# Patient Record
Sex: FEMALE | Race: WHITE | NOT HISPANIC OR LATINO | Employment: OTHER | URBAN - METROPOLITAN AREA
[De-identification: names, ages, dates, MRNs, and addresses within clinical notes are randomized per-mention and may not be internally consistent; named-entity substitution may affect disease eponyms.]

---

## 2017-04-18 ENCOUNTER — HOSPITAL ENCOUNTER (OUTPATIENT)
Dept: RADIOLOGY | Facility: HOSPITAL | Age: 77
Discharge: HOME | End: 2017-04-18
Attending: FAMILY MEDICINE
Payer: MEDICARE

## 2017-04-18 ENCOUNTER — OFFICE VISIT (OUTPATIENT)
Dept: FAMILY MEDICINE | Facility: CLINIC | Age: 77
End: 2017-04-18
Payer: MEDICARE

## 2017-04-18 VITALS
HEART RATE: 68 BPM | SYSTOLIC BLOOD PRESSURE: 120 MMHG | HEIGHT: 65 IN | DIASTOLIC BLOOD PRESSURE: 82 MMHG | TEMPERATURE: 98 F | BODY MASS INDEX: 34.05 KG/M2 | OXYGEN SATURATION: 96 % | WEIGHT: 204.38 LBS

## 2017-04-18 DIAGNOSIS — I12.9 BENIGN HYPERTENSION WITH CHRONIC KIDNEY DISEASE, STAGE III: ICD-10-CM

## 2017-04-18 DIAGNOSIS — K21.9 GASTROESOPHAGEAL REFLUX DISEASE WITHOUT ESOPHAGITIS: ICD-10-CM

## 2017-04-18 DIAGNOSIS — E78.5 HYPERLIPIDEMIA, UNSPECIFIED HYPERLIPIDEMIA TYPE: ICD-10-CM

## 2017-04-18 DIAGNOSIS — M25.552 LEFT HIP PAIN: ICD-10-CM

## 2017-04-18 DIAGNOSIS — I10 HYPERTENSION, BENIGN: Primary | ICD-10-CM

## 2017-04-18 DIAGNOSIS — E11.9 CONTROLLED TYPE 2 DIABETES MELLITUS WITHOUT COMPLICATION, WITHOUT LONG-TERM CURRENT USE OF INSULIN: ICD-10-CM

## 2017-04-18 DIAGNOSIS — Z12.31 ENCOUNTER FOR SCREENING MAMMOGRAM FOR BREAST CANCER: ICD-10-CM

## 2017-04-18 DIAGNOSIS — N18.30 BENIGN HYPERTENSION WITH CHRONIC KIDNEY DISEASE, STAGE III: ICD-10-CM

## 2017-04-18 PROCEDURE — 73502 X-RAY EXAM HIP UNI 2-3 VIEWS: CPT | Mod: TC,PO,LT

## 2017-04-18 PROCEDURE — 99499 UNLISTED E&M SERVICE: CPT | Mod: S$PBB,,, | Performed by: FAMILY MEDICINE

## 2017-04-18 PROCEDURE — 1125F AMNT PAIN NOTED PAIN PRSNT: CPT | Mod: S$GLB,,, | Performed by: FAMILY MEDICINE

## 2017-04-18 PROCEDURE — 1160F RVW MEDS BY RX/DR IN RCRD: CPT | Mod: S$GLB,,, | Performed by: FAMILY MEDICINE

## 2017-04-18 PROCEDURE — 99214 OFFICE O/P EST MOD 30 MIN: CPT | Mod: S$GLB,,, | Performed by: FAMILY MEDICINE

## 2017-04-18 PROCEDURE — 3079F DIAST BP 80-89 MM HG: CPT | Mod: S$GLB,,, | Performed by: FAMILY MEDICINE

## 2017-04-18 PROCEDURE — 1159F MED LIST DOCD IN RCRD: CPT | Mod: S$GLB,,, | Performed by: FAMILY MEDICINE

## 2017-04-18 PROCEDURE — 99999 PR PBB SHADOW E&M-EST. PATIENT-LVL III: CPT | Mod: PBBFAC,,, | Performed by: FAMILY MEDICINE

## 2017-04-18 PROCEDURE — 3074F SYST BP LT 130 MM HG: CPT | Mod: S$GLB,,, | Performed by: FAMILY MEDICINE

## 2017-04-18 PROCEDURE — 73502 X-RAY EXAM HIP UNI 2-3 VIEWS: CPT | Mod: 26,LT,, | Performed by: RADIOLOGY

## 2017-04-18 RX ORDER — IRBESARTAN 150 MG/1
150 TABLET ORAL NIGHTLY
Qty: 30 TABLET | Refills: 12 | Status: SHIPPED | OUTPATIENT
Start: 2017-04-18 | End: 2018-05-09 | Stop reason: SDUPTHER

## 2017-04-18 RX ORDER — MULTIVITAMIN
1 TABLET ORAL DAILY
COMMUNITY

## 2017-04-18 RX ORDER — HYDROCHLOROTHIAZIDE 25 MG/1
25 TABLET ORAL DAILY
Qty: 30 TABLET | Refills: 12 | Status: SHIPPED | OUTPATIENT
Start: 2017-04-18 | End: 2018-05-09 | Stop reason: SDUPTHER

## 2017-04-18 RX ORDER — PREDNISONE 20 MG/1
60 TABLET ORAL DAILY
Qty: 15 TABLET | Refills: 0 | Status: SHIPPED | OUTPATIENT
Start: 2017-04-18 | End: 2017-04-25

## 2017-04-18 RX ORDER — MULTIVIT WITH MINERALS/HERBS
1 TABLET ORAL DAILY
COMMUNITY
End: 2019-08-13

## 2017-04-18 RX ORDER — METOPROLOL TARTRATE 100 MG/1
100 TABLET ORAL 2 TIMES DAILY
Qty: 60 TABLET | Refills: 12 | Status: SHIPPED | OUTPATIENT
Start: 2017-04-18 | End: 2018-05-09 | Stop reason: SDUPTHER

## 2017-04-18 NOTE — MR AVS SNAPSHOT
[facility]  [address]  [place] [address]  Phone: [phone]  Fax: [phone]                  Char Savage   2017 9:00 AM   Office Visit    Description:  Female : 1940   Provider:  Di Perez MD   Department:  [facility] [facility]           Reason for Visit     Establish Care     Diabetes     Medication Refill     Hip Pain           Diagnoses this Visit        Comments    Hypertension, benign    -  Primary     Gastroesophageal reflux disease without esophagitis         Hyperlipidemia, unspecified hyperlipidemia type         Benign hypertension with chronic kidney disease, stage III         Controlled type 2 diabetes mellitus without complication, without long-term current use of insulin         Encounter for screening mammogram for breast cancer         Left hip pain                To Do List           Future Appointments        Provider Department Dept Phone    2017 10:30 AM [facility] XR1 300 LB LIMIT [facility] [phone]      Goals (5 Years of Data)     None       These Medications        Disp Refills Start End    metoprolol tartrate (LOPRESSOR) 100 MG tablet 60 tablet 12 2017     Take 1 tablet (100 mg total) by mouth 2 (two) times daily. - Oral    Pharmacy: [address] Ph #: [phone]       hydrochlorothiazide (HYDRODIURIL) 25 MG tablet 30 tablet 2017     Take 1 tablet (25 mg total) by mouth once daily. - Oral    Pharmacy: [address] Ph #: [phone]       ranitidine (ZANTAC) 150 MG tablet 60 tablet 12 2017     Take 1 tablet (150 mg total) by mouth 2 (two) times daily. - Oral    Pharmacy: [address] Ph #: [phone]       irbesartan (AVAPRO) 150 MG tablet 30 tablet 12 2017     Take 1 tablet (150 mg total) by mouth every evening. -  Oral    [facility] [address] [facility] #: [phone]       predniSONE (DELTASONE) 20 MG tablet 15 tablet 0 4/18/2017 4/25/2017    Take 3 tablets (60 mg total) by mouth once daily. - Oral    Pharmacy: [address] Ph #: [phone]         [facility]     [facility] - 24/7 Assistance  Unless otherwise directed by your provider, please contact [facility], our nurse care line that is available for 24/7 assistance.     Registered nurses in the [facility] provide: appointment scheduling, clinical advisement, health education, and other advisory services.  Call: [phone] (toll free)               Medications           Message regarding Medications     Verify the changes and/or additions to your medication regime listed below are the same as discussed with your clinician today.  If any of these changes or additions are incorrect, please notify your healthcare provider.        START taking these NEW medications        Refills    predniSONE (DELTASONE) 20 MG tablet 0    Sig: Take 3 tablets (60 mg total) by mouth once daily.    Class: Normal    Route: Oral           Verify that the below list of medications is an accurate representation of the medications you are currently taking.  If none reported, the list may be blank. If incorrect, please contact your healthcare provider. Carry this list with you in case of emergency.           Current Medications     ascorbic acid (VITAMIN C) 100 MG tablet Take 100 mg by mouth once daily.      aspirin (ECOTRIN) 81 MG EC tablet Take 81 mg by mouth once daily.      b complex vitamins tablet Take 1 tablet by mouth once daily.    hydrochlorothiazide (HYDRODIURIL) 25 MG tablet Take 1 tablet (25 mg total) by mouth once daily.    irbesartan (AVAPRO) 150 MG tablet Take 1 tablet (150 mg total) by mouth every evening.    metformin (GLUCOPHAGE) 500 MG  "tablet Take 1 tablet (500 mg total) by mouth daily with breakfast.    metoprolol tartrate (LOPRESSOR) 100 MG tablet Take 1 tablet (100 mg total) by mouth 2 (two) times daily.    multivitamin (ONE DAILY MULTIVITAMIN) per tablet Take 1 tablet by mouth once daily.    pravastatin (PRAVACHOL) 20 MG tablet Take 1 tablet (20 mg total) by mouth once daily.    ranitidine (ZANTAC) 150 MG tablet Take 1 tablet (150 mg total) by mouth 2 (two) times daily.    predniSONE (DELTASONE) 20 MG tablet Take 3 tablets (60 mg total) by mouth once daily.           Clinical Reference Information           Your Vitals Were     BP Pulse Temp Height Weight SpO2    120/82 (BP Location: Left arm, Patient Position: Sitting, BP Method: Manual) 68 98.2 °F (36.8 °C) (Oral) 5' 5" (1.651 m) 92.7 kg (204 lb 5.9 oz) 96%    BMI                34.01 kg/m2          Blood Pressure          Most Recent Value    BP  120/82      Allergies as of 4/18/2017     Amoxicillin    Bactrim [Sulfamethoxazole-trimethoprim]    Darvon [Propoxyphene]    Toradol [Ketorolac]    Vibramycin [Doxycycline Calcium]      Immunizations Administered on Date of Encounter - 4/18/2017     None      Orders Placed During Today's Visit     Future Labs/Procedures Expected by Expires    Comprehensive metabolic panel  4/18/2017 4/18/2018    Hemoglobin A1c  4/18/2017 4/18/2018    Mammo Digital Screening Bilat with CAD  4/18/2017 6/18/2018    X-Ray Hip 2 View Left  4/18/2017 4/18/2018      [facility] Sign-Up     Activating your [facility] account is as easy as 1-2-3!     1) Visit [email or web address], select Sign Up Now, enter this activation code and your date of birth, then select Next.  [email or web address]  Expires: 6/2/2017 10:12 AM      2) Create a username and password to use when you visit [facility] in the future and select a security question in case you lose your password and select Next.    3) Enter your e-mail address and click Sign Up!    Additional Information  If you have questions, please " e-mail [email or web address] or call [phone] to talk to our [facility] staff. Remember, [facility] is NOT to be used for urgent needs. For medical emergencies, dial 911.         Language Assistance Services     ATTENTION: Language assistance services are available, free of charge. Please call [phone].      ATENCIÓN: Si habla español, tiene a awan disposición servicios gratuitos de asistencia lingüística. Llame al [phone].     CHÚ Ý: N?u b?n nói Ti?ng Vi?t, có các d?ch v? h? tr? ngôn ng? mi?n phí dành cho b?n. G?i s? [phone].         [facility] complies with applicable Federal civil rights laws and does not discriminate on the basis of race, color, national origin, age, disability, or sex.

## 2017-04-18 NOTE — PROGRESS NOTES
Office Visit    Patient Name: Char Savage    : 1940  MRN: [id number]    Subjective:  Char is a 76 y.o. female who presents today for:    Establish Care; Diabetes; Medication Refill; and Hip Pain      This patient has multiple medical diagnoses as noted below.  This patient is known to me and to this clinic.   She reports left hip pain that will go to her left knee and calf.  This began around .  She cannot tolerate Aleve or advil because of renal issues.  She has used tylenol.  She will have a sharp pain or can be a dull pain.  It is constant pain.  Most of the time it is sharp pain.  She can have muscle spasms in the left leg that will wake her up at night.  She has use aspercreme.  This can help the pain, but does not make the pain go away permanent.  No injury to the hip.  She does have a burning sensation in her leg at the anterior aspect of her left thigh.  It bothers her at night and first thing in the morning.      Active Problem List  Patient Active Problem List   Diagnosis    Hyperlipidemia    Obesity, Class II, BMI 35-39.9, with comorbidity    Benign hypertension with chronic kidney disease, stage III       Past Surgical History  Past Surgical History:   Procedure Laterality Date    APPENDECTOMY      BREAST BIOPSY  x3    CATARACT EXTRACTION      od/os    COLONOSCOPY      ORBITAL FRACTURE SURGERY      os dr coleman    PARTIAL HYSTERECTOMY      RIB FRACTURE SURGERY      TONSILLECTOMY, ADENOIDECTOMY         Family History  Family History   Problem Relation Age of Onset    Cancer Mother     Cataracts Father     Hypertension Father     No Known Problems Sister     No Known Problems Brother     No Known Problems Maternal Aunt     No Known Problems Maternal Uncle     No Known Problems Paternal Aunt     No Known Problems Paternal Uncle     No Known Problems Maternal Grandmother     No Known Problems Maternal Grandfather     No Known Problems Paternal  Grandmother     No Known Problems Paternal Grandfather     Amblyopia Neg Hx     Blindness Neg Hx     Diabetes Neg Hx     Glaucoma Neg Hx     Macular degeneration Neg Hx     Retinal detachment Neg Hx     Strabismus Neg Hx     Stroke Neg Hx     Thyroid disease Neg Hx        Social History  Social History     Social History    Marital status:      Spouse name: N/A    Number of children: N/A    Years of education: N/A     Occupational History    Not on file.     Social History Main Topics    Smoking status: Never Smoker    Smokeless tobacco: Never Used    Alcohol use No    Drug use: No    Sexual activity: Not on file     Other Topics Concern    Not on file     Social History Narrative       Current Medications  Medications reviewed and updated.     Allergies   Review of patient's allergies indicates:   Allergen Reactions    Amoxicillin Other (See Comments)    Bactrim [sulfamethoxazole-trimethoprim] Other (See Comments)    Darvon [propoxyphene] Other (See Comments)    Toradol [ketorolac] Other (See Comments)    Vibramycin [doxycycline calcium] Rash       Review of Systems (Pertinent positives)  Review of Systems   Constitutional: Negative for activity change, appetite change, fatigue, fever and unexpected weight change.   HENT: Negative for ear discharge, ear pain, nosebleeds, rhinorrhea and sore throat.    Eyes: Negative.    Respiratory: Negative for apnea, cough, chest tightness, shortness of breath and wheezing.    Cardiovascular: Negative for chest pain, palpitations and leg swelling.   Gastrointestinal: Negative for abdominal distention, abdominal pain, constipation, diarrhea and vomiting.   Endocrine: Negative for cold intolerance, heat intolerance, polydipsia and polyuria.   Genitourinary: Negative for decreased urine volume, menstrual problem, urgency, vaginal bleeding, vaginal discharge and vaginal pain.   Musculoskeletal: Positive for myalgias.   Skin: Negative for rash.  "  Neurological: Negative for dizziness and headaches.   Hematological: Does not bruise/bleed easily.   Psychiatric/Behavioral: Negative for agitation, sleep disturbance and suicidal ideas.       /82 (BP Location: Left arm, Patient Position: Sitting, BP Method: Manual)  Pulse 68  Temp 98.2 °F (36.8 °C) (Oral)   Ht 5' 5" (1.651 m)  Wt 92.7 kg (204 lb 5.9 oz)  SpO2 96%  BMI 34.01 kg/m2    Physical Exam   Constitutional: She is oriented to person, place, and time. She appears well-developed and well-nourished.   HENT:   Head: Normocephalic and atraumatic.   Right Ear: External ear normal.   Left Ear: External ear normal.   Nose: Nose normal.   Mouth/Throat: Oropharynx is clear and moist.   Eyes: Conjunctivae and EOM are normal. Pupils are equal, round, and reactive to light.   Neck: Normal range of motion. No JVD present. No thyromegaly present.   Cardiovascular: Normal rate, regular rhythm and normal heart sounds.    Pulmonary/Chest: Effort normal and breath sounds normal. She has no wheezes.   Abdominal: Soft. Bowel sounds are normal. She exhibits no distension. There is no tenderness.   Musculoskeletal: Normal range of motion.        Legs:  Lymphadenopathy:     She has no cervical adenopathy.   Neurological: She is alert and oriented to person, place, and time.   Skin: Skin is warm and dry.   Psychiatric: She has a normal mood and affect. Her behavior is normal.   Vitals reviewed.      Health Maintenance  Health Maintenance Topics with due status: Not Due       Topic Last Completion Date    Eye Exam 09/20/2016    Lipid Panel 11/10/2016    Hemoglobin A1c 04/18/2017       Assessment/Plan:  Char Savage is a 76 y.o. female who presents today for :    Char was seen today for establish care, diabetes, medication refill and hip pain.    Diagnoses and all orders for this visit:    Hypertension, benign  -     metoprolol tartrate (LOPRESSOR) 100 MG tablet; Take 1 tablet (100 mg total) by mouth 2 (two) times " daily.  -     hydrochlorothiazide (HYDRODIURIL) 25 MG tablet; Take 1 tablet (25 mg total) by mouth once daily.  -     irbesartan (AVAPRO) 150 MG tablet; Take 1 tablet (150 mg total) by mouth every evening.  -     Hemoglobin A1c; Future  -     Comprehensive metabolic panel; Future    Gastroesophageal reflux disease without esophagitis  -     ranitidine (ZANTAC) 150 MG tablet; Take 1 tablet (150 mg total) by mouth 2 (two) times daily.    Hyperlipidemia, unspecified hyperlipidemia type  -   The patient is asked to make an attempt to improve diet and exercise patterns to aid in medical management of this problem.    Benign hypertension with chronic kidney disease, stage III  -  Patient is stable.  Assess and addressed all modifiable risk factors.  Continue with appropriate management to prevent complications.    Controlled type 2 diabetes mellitus without complication, without long-term current use of insulin  -     Hemoglobin A1c; Future    Encounter for screening mammogram for breast cancer  -     Mammo Digital Screening Bilat with CAD; Future    Left hip pain  -     X-Ray Hip 2 View Left; Future  -     predniSONE (DELTASONE) 20 MG tablet; Take 3 tablets (60 mg total) by mouth once daily.  -  Conservative management        No Follow-up on file.

## 2017-04-20 ENCOUNTER — TELEPHONE (OUTPATIENT)
Dept: FAMILY MEDICINE | Facility: CLINIC | Age: 77
End: 2017-04-20

## 2017-04-20 NOTE — TELEPHONE ENCOUNTER
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

## 2017-04-20 NOTE — TELEPHONE ENCOUNTER
----- Message from Di Perez MD sent at 4/19/2017  3:28 PM CDT -----  Your blood sugar levels are doing well.  Keep up the great work.  No changes at this time.

## 2017-05-02 ENCOUNTER — HOSPITAL ENCOUNTER (OUTPATIENT)
Dept: RADIOLOGY | Facility: HOSPITAL | Age: 77
Discharge: HOME | End: 2017-05-02
Attending: FAMILY MEDICINE
Payer: MEDICARE

## 2017-05-02 DIAGNOSIS — Z12.31 ENCOUNTER FOR SCREENING MAMMOGRAM FOR BREAST CANCER: ICD-10-CM

## 2017-05-02 PROCEDURE — 77067 SCR MAMMO BI INCL CAD: CPT | Mod: 26,,, | Performed by: RADIOLOGY

## 2017-05-02 PROCEDURE — 77063 BREAST TOMOSYNTHESIS BI: CPT | Mod: 26,,, | Performed by: RADIOLOGY

## 2017-05-02 PROCEDURE — 77067 SCR MAMMO BI INCL CAD: CPT | Mod: TC

## 2017-05-22 DIAGNOSIS — E78.00 PURE HYPERCHOLESTEROLEMIA: ICD-10-CM

## 2017-05-22 RX ORDER — PRAVASTATIN SODIUM 20 MG/1
20 TABLET ORAL DAILY
Qty: 30 TABLET | Refills: 6 | Status: SHIPPED | OUTPATIENT
Start: 2017-05-22 | End: 2017-10-10

## 2017-05-23 DIAGNOSIS — E11.9 DIABETES MELLITUS TYPE II, NON INSULIN DEPENDENT: ICD-10-CM

## 2017-05-23 RX ORDER — METFORMIN HYDROCHLORIDE 500 MG/1
500 TABLET ORAL
Qty: 30 TABLET | Refills: 6 | Status: SHIPPED | OUTPATIENT
Start: 2017-05-23 | End: 2017-10-17

## 2017-10-10 ENCOUNTER — OFFICE VISIT (OUTPATIENT)
Dept: OPTOMETRY | Facility: CLINIC | Age: 77
End: 2017-10-10
Payer: MEDICARE

## 2017-10-10 DIAGNOSIS — H52.4 MYOPIA WITH ASTIGMATISM AND PRESBYOPIA, BILATERAL: ICD-10-CM

## 2017-10-10 DIAGNOSIS — D31.31 CHOROIDAL NEVUS, RIGHT: ICD-10-CM

## 2017-10-10 DIAGNOSIS — Z96.1 PSEUDOPHAKIA OF BOTH EYES: ICD-10-CM

## 2017-10-10 DIAGNOSIS — Z13.5 GLAUCOMA SCREENING: ICD-10-CM

## 2017-10-10 DIAGNOSIS — E11.9 DIABETES MELLITUS TYPE 2 WITHOUT RETINOPATHY: Primary | ICD-10-CM

## 2017-10-10 DIAGNOSIS — H52.203 MYOPIA WITH ASTIGMATISM AND PRESBYOPIA, BILATERAL: ICD-10-CM

## 2017-10-10 DIAGNOSIS — H52.13 MYOPIA WITH ASTIGMATISM AND PRESBYOPIA, BILATERAL: ICD-10-CM

## 2017-10-10 PROCEDURE — 92014 COMPRE OPH EXAM EST PT 1/>: CPT | Mod: S$GLB,,, | Performed by: OPTOMETRIST

## 2017-10-10 PROCEDURE — 99499 UNLISTED E&M SERVICE: CPT | Mod: S$PBB,,, | Performed by: OPTOMETRIST

## 2017-10-10 PROCEDURE — 99999 PR PBB SHADOW E&M-EST. PATIENT-LVL II: CPT | Mod: PBBFAC,,, | Performed by: OPTOMETRIST

## 2017-10-10 PROCEDURE — 92015 DETERMINE REFRACTIVE STATE: CPT | Mod: S$GLB,,, | Performed by: OPTOMETRIST

## 2017-10-10 NOTE — PROGRESS NOTES
HPI     DLS:  9/20/16    Pt here for yearly check.  Pt states VA OU fluctuates in the morning.  Pt   denies flashes, floaters, eye pain or headaches.  Pt states some itching   OU.  Pt denies tearing or burning OU.      No eyedrops  S/p phaco w/IOL OU     Hemoglobin A1C       Date                     Value               Ref Range             Status                04/18/2017               6.4 (H)             4.5 - 6.2 %           Final                 11/10/2016               6.5 (H)             4.5 - 6.2 %           Final                 04/18/2016               6.8 (H)             4.5 - 6.2 %           Final            ----------      Last edited by Julien Ferrari, OD on 10/10/2017 10:35 AM. (History)            Assessment /Plan     For exam results, see Encounter Report.    Diabetes mellitus type 2 without retinopathy  -No retinopathy noted today.  Continued control with primary care physician and annual comprehensive eye exam.    Choroidal nevus, right  -monitor annual DFE    Glaucoma screening  -Monitor with annual eye exam and IOP check  -OCT at future.  Stable CD's today    Pseudophakia of both eyes  -clear, centered    Myopia with astigmatism and presbyopia, bilateral  Eyeglass Final Rx     Eyeglass Final Rx       Sphere Cylinder Axis Dist VA Add    Right -2.50 +3.00 010 20/25 +2.50    Left -1.25 +2.75 165 20/25-- +2.50    Type:  PAL    Expiration Date:  10/11/2018                  RTC 1 yr

## 2017-10-17 ENCOUNTER — OFFICE VISIT (OUTPATIENT)
Dept: FAMILY MEDICINE | Facility: CLINIC | Age: 77
End: 2017-10-17
Payer: MEDICARE

## 2017-10-17 ENCOUNTER — LAB VISIT (OUTPATIENT)
Dept: LAB | Facility: HOSPITAL | Age: 77
End: 2017-10-17
Attending: FAMILY MEDICINE
Payer: MEDICARE

## 2017-10-17 VITALS
SYSTOLIC BLOOD PRESSURE: 130 MMHG | DIASTOLIC BLOOD PRESSURE: 60 MMHG | HEIGHT: 65 IN | OXYGEN SATURATION: 97 % | HEART RATE: 67 BPM | TEMPERATURE: 98 F | BODY MASS INDEX: 33.72 KG/M2 | WEIGHT: 202.38 LBS

## 2017-10-17 DIAGNOSIS — Z23 NEED FOR 23-POLYVALENT PNEUMOCOCCAL POLYSACCHARIDE VACCINE: ICD-10-CM

## 2017-10-17 DIAGNOSIS — E11.9 TYPE 2 DIABETES MELLITUS WITHOUT COMPLICATION, WITHOUT LONG-TERM CURRENT USE OF INSULIN: ICD-10-CM

## 2017-10-17 DIAGNOSIS — M94.9 DISORDER OF BONE AND CARTILAGE: ICD-10-CM

## 2017-10-17 DIAGNOSIS — N18.30 BENIGN HYPERTENSION WITH CHRONIC KIDNEY DISEASE, STAGE III: ICD-10-CM

## 2017-10-17 DIAGNOSIS — E11.9 TYPE 2 DIABETES MELLITUS WITHOUT COMPLICATION, WITHOUT LONG-TERM CURRENT USE OF INSULIN: Primary | ICD-10-CM

## 2017-10-17 DIAGNOSIS — M89.9 DISORDER OF BONE AND CARTILAGE: ICD-10-CM

## 2017-10-17 DIAGNOSIS — E78.5 HYPERLIPIDEMIA, UNSPECIFIED HYPERLIPIDEMIA TYPE: ICD-10-CM

## 2017-10-17 DIAGNOSIS — I12.9 BENIGN HYPERTENSION WITH CHRONIC KIDNEY DISEASE, STAGE III: ICD-10-CM

## 2017-10-17 LAB
ALBUMIN SERPL BCP-MCNC: 3.7 G/DL
ALP SERPL-CCNC: 101 U/L
ALT SERPL W/O P-5'-P-CCNC: 14 U/L
ANION GAP SERPL CALC-SCNC: 13 MMOL/L
AST SERPL-CCNC: 20 U/L
BASOPHILS # BLD AUTO: 0.09 K/UL
BASOPHILS NFR BLD: 1.2 %
BILIRUB SERPL-MCNC: 0.8 MG/DL
BUN SERPL-MCNC: 27 MG/DL
CALCIUM SERPL-MCNC: 10.3 MG/DL
CHLORIDE SERPL-SCNC: 104 MMOL/L
CHOLEST SERPL-MCNC: 248 MG/DL
CHOLEST/HDLC SERPL: 4 {RATIO}
CO2 SERPL-SCNC: 24 MMOL/L
CREAT SERPL-MCNC: 1.4 MG/DL
DIFFERENTIAL METHOD: ABNORMAL
EOSINOPHIL # BLD AUTO: 0.2 K/UL
EOSINOPHIL NFR BLD: 1.9 %
ERYTHROCYTE [DISTWIDTH] IN BLOOD BY AUTOMATED COUNT: 13.8 %
EST. GFR  (AFRICAN AMERICAN): 41.2 ML/MIN/1.73 M^2
EST. GFR  (NON AFRICAN AMERICAN): 35.8 ML/MIN/1.73 M^2
GLUCOSE SERPL-MCNC: 127 MG/DL
HCT VFR BLD AUTO: 42.4 %
HDLC SERPL-MCNC: 62 MG/DL
HDLC SERPL: 25 %
HGB BLD-MCNC: 13.5 G/DL
IMM GRANULOCYTES # BLD AUTO: 0.02 K/UL
IMM GRANULOCYTES NFR BLD AUTO: 0.3 %
LDLC SERPL CALC-MCNC: 151.6 MG/DL
LYMPHOCYTES # BLD AUTO: 3.1 K/UL
LYMPHOCYTES NFR BLD: 39.5 %
MCH RBC QN AUTO: 28.3 PG
MCHC RBC AUTO-ENTMCNC: 31.8 G/DL
MCV RBC AUTO: 89 FL
MONOCYTES # BLD AUTO: 0.9 K/UL
MONOCYTES NFR BLD: 11.5 %
NEUTROPHILS # BLD AUTO: 3.5 K/UL
NEUTROPHILS NFR BLD: 45.6 %
NONHDLC SERPL-MCNC: 186 MG/DL
NRBC BLD-RTO: 0 /100 WBC
PLATELET # BLD AUTO: 317 K/UL
PMV BLD AUTO: 10.5 FL
POTASSIUM SERPL-SCNC: 5.1 MMOL/L
PROT SERPL-MCNC: 8 G/DL
RBC # BLD AUTO: 4.77 M/UL
SODIUM SERPL-SCNC: 141 MMOL/L
TRIGL SERPL-MCNC: 172 MG/DL
TSH SERPL DL<=0.005 MIU/L-ACNC: 3.13 UIU/ML
WBC # BLD AUTO: 7.75 K/UL

## 2017-10-17 PROCEDURE — G0009 ADMIN PNEUMOCOCCAL VACCINE: HCPCS | Mod: S$GLB,,, | Performed by: FAMILY MEDICINE

## 2017-10-17 PROCEDURE — 99499 UNLISTED E&M SERVICE: CPT | Mod: S$PBB,,, | Performed by: FAMILY MEDICINE

## 2017-10-17 PROCEDURE — 80061 LIPID PANEL: CPT

## 2017-10-17 PROCEDURE — 85025 COMPLETE CBC W/AUTO DIFF WBC: CPT

## 2017-10-17 PROCEDURE — 99999 PR PBB SHADOW E&M-EST. PATIENT-LVL III: CPT | Mod: PBBFAC,,, | Performed by: FAMILY MEDICINE

## 2017-10-17 PROCEDURE — 36415 COLL VENOUS BLD VENIPUNCTURE: CPT | Mod: PO

## 2017-10-17 PROCEDURE — 80053 COMPREHEN METABOLIC PANEL: CPT

## 2017-10-17 PROCEDURE — 99214 OFFICE O/P EST MOD 30 MIN: CPT | Mod: S$GLB,,, | Performed by: FAMILY MEDICINE

## 2017-10-17 PROCEDURE — 90732 PPSV23 VACC 2 YRS+ SUBQ/IM: CPT | Mod: S$GLB,,, | Performed by: FAMILY MEDICINE

## 2017-10-17 PROCEDURE — 84443 ASSAY THYROID STIM HORMONE: CPT

## 2017-10-17 PROCEDURE — 83036 HEMOGLOBIN GLYCOSYLATED A1C: CPT

## 2017-10-17 RX ORDER — ROSUVASTATIN CALCIUM 10 MG/1
10 TABLET, COATED ORAL DAILY
Qty: 90 TABLET | Refills: 3 | Status: SHIPPED | OUTPATIENT
Start: 2017-10-17 | End: 2018-06-06 | Stop reason: SINTOL

## 2017-10-17 RX ORDER — METFORMIN HYDROCHLORIDE 500 MG/1
500 TABLET, EXTENDED RELEASE ORAL
Qty: 30 TABLET | Refills: 6 | Status: SHIPPED | OUTPATIENT
Start: 2017-10-17 | End: 2018-05-15 | Stop reason: SDUPTHER

## 2017-10-17 NOTE — PROGRESS NOTES
Office Visit    Patient Name: Char Savage    : 1940  MRN: [id number]    Subjective:  Char is a 77 y.o. female who presents today for:    Diabetes (six month health check)      This patient has multiple medical diagnoses as noted below.  This patient is known to me and to this clinic. She recently reports that she stopped taking the pravastatin secondary to muscle pain. She had concerns about checking her blood glucose levels at home.  She has been consistent under 7 for hgA1C.      She had increasing pain in her hip pain.  It has impacted her quality of life.  She stopped the pravastatin.  This did improve her pain.  She also reports increasing leg cramps at night.    Patient Active Problem List   Diagnosis    Hyperlipidemia    Obesity, Class II, BMI 35-39.9, with comorbidity    Benign hypertension with chronic kidney disease, stage III    Type 2 diabetes mellitus without complication, without long-term current use of insulin       Past Surgical History:   Procedure Laterality Date    APPENDECTOMY      BREAST BIOPSY  x3    CATARACT EXTRACTION      od/os    COLONOSCOPY      ORBITAL FRACTURE SURGERY      os dr coleman    PARTIAL HYSTERECTOMY      RIB FRACTURE SURGERY      TONSILLECTOMY, ADENOIDECTOMY         Family History   Problem Relation Age of Onset    Cancer Mother     Cataracts Father     Hypertension Father     No Known Problems Sister     No Known Problems Brother     No Known Problems Maternal Aunt     No Known Problems Maternal Uncle     No Known Problems Paternal Aunt     No Known Problems Paternal Uncle     No Known Problems Maternal Grandmother     No Known Problems Maternal Grandfather     No Known Problems Paternal Grandmother     No Known Problems Paternal Grandfather     Amblyopia Neg Hx     Blindness Neg Hx     Diabetes Neg Hx     Glaucoma Neg Hx     Macular degeneration Neg Hx     Retinal detachment Neg Hx     Strabismus Neg Hx     Stroke Neg  Hx     Thyroid disease Neg Hx        Social History     Social History    Marital status:      Spouse name: N/A    Number of children: N/A    Years of education: N/A     Occupational History    Not on file.     Social History Main Topics    Smoking status: Never Smoker    Smokeless tobacco: Never Used    Alcohol use No    Drug use: No    Sexual activity: Not on file     Other Topics Concern    Not on file     Social History Narrative    No narrative on file       Current Medications  Medications reviewed and updated.     Allergies   Review of patient's allergies indicates:   Allergen Reactions    Amoxicillin Other (See Comments)    Bactrim [sulfamethoxazole-trimethoprim] Other (See Comments)    Darvon [propoxyphene] Other (See Comments)    Toradol [ketorolac] Other (See Comments)    Vibramycin [doxycycline calcium] Rash         Labs  Lab Results   Component Value Date    HGBA1C 6.4 (H) 04/18/2017     Lab Results   Component Value Date     04/18/2017    K 5.1 04/18/2017     04/18/2017    CO2 28 04/18/2017    BUN 29 (H) 04/18/2017    CREATININE 1.5 (H) 04/18/2017    CALCIUM 10.4 04/18/2017    ANIONGAP 8 04/18/2017    ESTGFRAFRICA 38.2 (A) 04/18/2017    EGFRNONAA 33.1 (A) 04/18/2017     Lab Results   Component Value Date    CHOL 188 11/10/2016    CHOL 193 04/18/2016    CHOL 193 04/14/2015     Lab Results   Component Value Date    HDL 60 11/10/2016    HDL 64 04/18/2016    HDL 64 04/14/2015     Lab Results   Component Value Date    LDLCALC 101.0 11/10/2016    LDLCALC 96.0 04/18/2016    LDLCALC 95.6 04/14/2015     Lab Results   Component Value Date    TRIG 135 11/10/2016    TRIG 165 (H) 04/18/2016    TRIG 167 (H) 04/14/2015     Lab Results   Component Value Date    CHOLHDL 31.9 11/10/2016    CHOLHDL 33.2 04/18/2016    CHOLHDL 33.2 04/14/2015     Last set of blood work has been reviewed as noted above.    Review of Systems   Constitutional: Negative for activity change, appetite change,  "fatigue, fever and unexpected weight change.   HENT: Negative.  Negative for ear discharge, ear pain, rhinorrhea and sore throat.    Eyes: Negative.    Respiratory: Negative for apnea, cough, chest tightness, shortness of breath and wheezing.    Cardiovascular: Negative for chest pain, palpitations and leg swelling.   Gastrointestinal: Positive for diarrhea. Negative for abdominal distention, abdominal pain, constipation and vomiting.   Endocrine: Negative for cold intolerance, heat intolerance, polydipsia and polyuria.   Genitourinary: Negative for decreased urine volume, menstrual problem, urgency, vaginal bleeding, vaginal discharge and vaginal pain.   Musculoskeletal: Positive for arthralgias and myalgias.   Skin: Negative for rash.   Neurological: Negative for dizziness and headaches.   Hematological: Does not bruise/bleed easily.   Psychiatric/Behavioral: Negative for agitation, sleep disturbance and suicidal ideas.       /60 (BP Location: Left arm, Patient Position: Sitting, BP Method: Large (Manual))   Pulse 67   Temp 98.1 °F (36.7 °C) (Oral)   Ht 5' 5" (1.651 m)   Wt 91.8 kg (202 lb 6.1 oz)   SpO2 97%   BMI 33.68 kg/m²      Physical Exam   Constitutional: She is oriented to person, place, and time. She appears well-developed and well-nourished.   HENT:   Head: Normocephalic.   Right Ear: External ear normal.   Left Ear: External ear normal.   Nose: Nose normal.   Mouth/Throat: Oropharynx is clear and moist.   Eyes: Conjunctivae and EOM are normal. Pupils are equal, round, and reactive to light.   Cardiovascular: Normal rate, regular rhythm and normal heart sounds.    Pulmonary/Chest: Effort normal and breath sounds normal.   Neurological: She is alert and oriented to person, place, and time.   Skin: Skin is warm and dry.   Vitals reviewed.      Health Maintenance  Health Maintenance       Date Due Completion Date    Foot Exam 08/03/1948 ---    TETANUS VACCINE 08/03/1956 ---    Zoster Vaccine " 08/03/1998 ---    DEXA SCAN 03/05/2016 3/5/2013    Pneumococcal (65+) (2 of 2 - PPSV23) 09/16/2016 9/16/2015    Hemoglobin A1c 10/18/2017 4/18/2017    Lipid Panel 11/10/2017 11/10/2016    Eye Exam 10/10/2018 10/10/2017    Override on 10/10/2017: Done          Assessment/Plan:  Char Savage is a 77 y.o. female who presents today for :    1. Type 2 diabetes mellitus without complication, without long-term current use of insulin    2. Benign hypertension with chronic kidney disease, stage III    3. Need for 23-polyvalent pneumococcal polysaccharide vaccine    4. Disorder of bone and cartilage    5. Hyperlipidemia, unspecified hyperlipidemia type        Problem List Items Addressed This Visit        Unprioritized    Benign hypertension with chronic kidney disease, stage III    Relevant Orders    CBC auto differential    Comprehensive metabolic panel    Lipid panel    Hemoglobin A1c    TSH    Hyperlipidemia    Overview     Cannot tolerate pravastatin         Current Assessment & Plan     Try rosuvastatin. If symptoms return will use diet modification to improve control          Relevant Medications    rosuvastatin (CRESTOR) 10 MG tablet    Type 2 diabetes mellitus without complication, without long-term current use of insulin - Primary    Overview     Cannot tolerate metformin          Current Assessment & Plan     Will use XR metformin          Relevant Medications    metformin (GLUCOPHAGE-XR) 500 MG 24 hr tablet    Other Relevant Orders    CBC auto differential    Comprehensive metabolic panel    Lipid panel    Hemoglobin A1c    TSH      Other Visit Diagnoses     Need for 23-polyvalent pneumococcal polysaccharide vaccine        Relevant Orders    Pneumococcal Polysaccharide Vaccine (23 Valent) (SQ/IM) (Completed)    Disorder of bone and cartilage        Relevant Orders    DXA Bone Density Spine And Hip          Return in about 6 months (around 4/17/2018) for Diabetes Mellitus II.     This note was created by  combination of typed  and Dragon dictation.  Transcription errors may be present.  If there are any questions, please contact me.

## 2017-10-17 NOTE — PATIENT INSTRUCTIONS
1.  Start the new metformin medication first.  Take for two week.  If no side effects continue the medication     2.  After two weeks of metformin begin the cholesterol medication rosuvastatin.  Take for two weeks.  If no side effects continue the medication.     3.  For cramps at night use a half a glass of tonic water or take one table to over the counter potassium.

## 2017-10-18 ENCOUNTER — TELEPHONE (OUTPATIENT)
Dept: FAMILY MEDICINE | Facility: CLINIC | Age: 77
End: 2017-10-18

## 2017-10-18 DIAGNOSIS — N18.9 CHRONIC KIDNEY DISEASE, UNSPECIFIED CKD STAGE: Primary | ICD-10-CM

## 2017-10-18 LAB
ESTIMATED AVG GLUCOSE: 131 MG/DL
HBA1C MFR BLD HPLC: 6.2 %

## 2017-10-18 NOTE — TELEPHONE ENCOUNTER
----- Message from ALEKS Wolfe sent at 10/18/2017  8:44 AM CDT -----  Diabetes is doing well. Continue current medication dosage. Your blood count and thyroid labs are normal. Your kidney labs are still decreased, a referral to nephrology (kidney specialist) has been placed. Someone from our referral department will contact you. Your cholesterol is elevated. Start the new cholesterol medication . Repeat lab in about 3 months.

## 2017-10-30 ENCOUNTER — HOSPITAL ENCOUNTER (OUTPATIENT)
Dept: RADIOLOGY | Facility: CLINIC | Age: 77
Discharge: HOME | End: 2017-10-30
Attending: FAMILY MEDICINE
Payer: MEDICARE

## 2017-10-30 DIAGNOSIS — M94.9 DISORDER OF BONE AND CARTILAGE: ICD-10-CM

## 2017-10-30 DIAGNOSIS — M89.9 DISORDER OF BONE AND CARTILAGE: ICD-10-CM

## 2017-10-30 PROCEDURE — 77080 DXA BONE DENSITY AXIAL: CPT | Mod: 26,,, | Performed by: INTERNAL MEDICINE

## 2017-10-30 PROCEDURE — 77080 DXA BONE DENSITY AXIAL: CPT | Mod: TC,PO

## 2017-11-28 ENCOUNTER — OFFICE VISIT (OUTPATIENT)
Dept: NEPHROLOGY | Facility: CLINIC | Age: 77
End: 2017-11-28
Payer: MEDICARE

## 2017-11-28 VITALS
SYSTOLIC BLOOD PRESSURE: 162 MMHG | HEIGHT: 65 IN | BODY MASS INDEX: 34.08 KG/M2 | WEIGHT: 204.56 LBS | HEART RATE: 74 BPM | DIASTOLIC BLOOD PRESSURE: 80 MMHG | OXYGEN SATURATION: 96 %

## 2017-11-28 DIAGNOSIS — N18.30 CHRONIC KIDNEY DISEASE, STAGE III (MODERATE): Primary | ICD-10-CM

## 2017-11-28 PROCEDURE — 99999 PR PBB SHADOW E&M-EST. PATIENT-LVL III: CPT | Mod: PBBFAC,,, | Performed by: INTERNAL MEDICINE

## 2017-11-28 PROCEDURE — 99499 UNLISTED E&M SERVICE: CPT | Mod: S$PBB,,, | Performed by: INTERNAL MEDICINE

## 2017-11-28 PROCEDURE — 99203 OFFICE O/P NEW LOW 30 MIN: CPT | Mod: S$GLB,,, | Performed by: INTERNAL MEDICINE

## 2017-11-28 NOTE — LETTER
December 5, 2017      Grace Delgado, FNP-C  [address] [facility]  [place] [address]           [facility]  [address] [facility]  [place] [address]  Phone: [phone]          Patient: Char Savage   MR Number: [id number]   YOB: 1940   Date of Visit: 11/28/2017       Dear Grace Delgado:    Thank you for referring Char Savage to me for evaluation. Attached you will find relevant portions of my assessment and plan of care.    If you have questions, please do not hesitate to call me. I look forward to following Char Savage along with you.    Sincerely,    Dunia Foster  CC:  No Recipients    If you would like to receive this communication electronically, please contact [email or web address] or [phone] to request more information on [facility] [employer] access.    For providers and/or their staff who would like to refer a patient to [facility], please contact us through our one-stop-shop provider referral line, [facility], at [phone].    If you feel you have received this communication in error or would no longer like to receive these types of communications, please e-mail [email or web address]

## 2017-12-06 NOTE — PROGRESS NOTES
Subjective:       Patient ID: Char Savage is a 77 y.o. White female who presents for new evaluation of Chronic Kidney Disease    HPI  Ms. Savage is a 77 year old woman with medical history of diabetes, hypertension presenting for evaluation of chronic kidney disease.  Patient creatinine 1.4-1.5mg/dL for the past few years.  She reports blood sugars well-controlled, blood pressure usually well-controlled.  She otherwise denies any fever, chest pain, shortness of breath, abdominal pain, diarrhea, dysuria/hematuria.  She reports adequate fluid intake, denies any NSAID use.      Review of Systems   Constitutional: Negative for appetite change, fatigue and fever.   Respiratory: Negative for chest tightness and shortness of breath.    Cardiovascular: Negative for chest pain and leg swelling.   Gastrointestinal: Negative for abdominal pain, constipation, diarrhea, nausea and vomiting.   Genitourinary: Negative for difficulty urinating, dysuria, flank pain, frequency, hematuria and urgency.   Musculoskeletal: Negative for arthralgias, joint swelling and myalgias.   Skin: Negative for rash and wound.   Neurological: Negative for dizziness, weakness and light-headedness.   All other systems reviewed and are negative.      Objective:      Physical Exam   Constitutional: She appears well-developed and well-nourished.   Cardiovascular: Normal rate, regular rhythm and normal heart sounds.  Exam reveals no gallop and no friction rub.    No murmur heard.  Pulmonary/Chest: Effort normal and breath sounds normal. No respiratory distress. She has no wheezes. She has no rales.   Abdominal: Soft. Bowel sounds are normal. There is no tenderness.   Musculoskeletal: She exhibits no edema.   Neurological: She is alert.   Skin: Skin is warm and dry. No rash noted. No erythema.   Psychiatric: She has a normal mood and affect.       Assessment:       1. Chronic kidney disease, stage III (moderate)        Plan:     Ms. Savage is a 77 year  old woman with medical history of diabetes, hypertension presenting for evaluation of chronic kidney disease.  Patient creatinine 1.4-1.5mg/dL, consistent with chronic kidney disease.  Creatinine stable, patient on ARB, will obtain urine studies to rule out infectious/glomerular etiology, will obtain renal US with doppler to rule out obstructive/vascular etiology.  Stressed importance of blood pressure/glycemic control, along with weight loss, to prevent any further progression of kidney disease, patient voiced understanding.     Return to clinic in 6 months with renal/heme panel, iron/TIBC/ferritin, urinalysis/culture, urine protein/creatinine ratio, PTH/VitD prior to next visit

## 2018-01-22 ENCOUNTER — TELEPHONE (OUTPATIENT)
Dept: FAMILY MEDICINE | Facility: CLINIC | Age: 78
End: 2018-01-22

## 2018-01-22 NOTE — TELEPHONE ENCOUNTER
----- Message from Tessa Heard sent at 1/22/2018  8:31 AM CST -----  Contact: self  [phone]  PINK DOT ..........PINK DOT     Pt has cough, fever , sore throat and chills. Pls call pt [phone]. Thanks......Cornelia DE LOS SANTOS

## 2018-01-22 NOTE — TELEPHONE ENCOUNTER
Spoke with patient said that she started last Monday with she thought was a cold. But has the week went by she started feeling worst. Fever , chills, head and chest congestion, coughing up cloudy looking phlegm. She did get the flu shot. Please advise

## 2018-01-23 ENCOUNTER — OFFICE VISIT (OUTPATIENT)
Dept: FAMILY MEDICINE | Facility: CLINIC | Age: 78
End: 2018-01-23
Payer: MEDICARE

## 2018-01-23 VITALS
SYSTOLIC BLOOD PRESSURE: 162 MMHG | TEMPERATURE: 98 F | HEIGHT: 65 IN | HEART RATE: 64 BPM | BODY MASS INDEX: 33.15 KG/M2 | DIASTOLIC BLOOD PRESSURE: 70 MMHG | WEIGHT: 198.94 LBS | OXYGEN SATURATION: 97 %

## 2018-01-23 DIAGNOSIS — B96.89 BACTERIAL SINUSITIS: Primary | ICD-10-CM

## 2018-01-23 DIAGNOSIS — J32.9 BACTERIAL SINUSITIS: Primary | ICD-10-CM

## 2018-01-23 PROCEDURE — 99999 PR PBB SHADOW E&M-EST. PATIENT-LVL IV: CPT | Mod: PBBFAC,,, | Performed by: NURSE PRACTITIONER

## 2018-01-23 PROCEDURE — 99214 OFFICE O/P EST MOD 30 MIN: CPT | Mod: S$GLB,,, | Performed by: NURSE PRACTITIONER

## 2018-01-23 RX ORDER — LEVOCETIRIZINE DIHYDROCHLORIDE 5 MG/1
5 TABLET, FILM COATED ORAL NIGHTLY
Qty: 30 TABLET | Refills: 0 | Status: SHIPPED | OUTPATIENT
Start: 2018-01-23 | End: 2018-06-06

## 2018-01-23 RX ORDER — CLINDAMYCIN HYDROCHLORIDE 300 MG/1
300 CAPSULE ORAL EVERY 8 HOURS
Qty: 21 CAPSULE | Refills: 0 | Status: SHIPPED | OUTPATIENT
Start: 2018-01-23 | End: 2018-06-06

## 2018-01-23 RX ORDER — BENZONATATE 200 MG/1
200 CAPSULE ORAL 3 TIMES DAILY PRN
Qty: 30 CAPSULE | Refills: 0 | Status: SHIPPED | OUTPATIENT
Start: 2018-01-23 | End: 2018-02-02

## 2018-01-23 RX ORDER — FLUTICASONE PROPIONATE 50 MCG
2 SPRAY, SUSPENSION (ML) NASAL DAILY
Qty: 16 G | Refills: 0 | Status: SHIPPED | OUTPATIENT
Start: 2018-01-23 | End: 2018-06-06

## 2018-01-23 NOTE — PROGRESS NOTES
Subjective:       Patient ID: Char Savage is a 77 y.o. female.    Chief Complaint: URI (runny nose,sore throat,clear/cloudy/yellow mucus,diarrhea,fever )    URI    This is a new problem. The current episode started 1 to 4 weeks ago. The problem has been unchanged. The maximum temperature recorded prior to her arrival was 100.4 - 100.9 F. Associated symptoms include coughing, rhinorrhea and sinus pain. Pertinent negatives include no congestion, ear pain, sneezing or sore throat. Treatments tried: warm salt water gargles, robitussin cough syrup and coricidin. The treatment provided mild relief.       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     Social History Main Topics    Smoking status: Never Smoker    Smokeless tobacco: Never Used    Alcohol use No    Drug use: No    Sexual activity: Not on file     Other Topics Concern    Not on file     Social History Narrative    No narrative on file       Past Surgical History:   Procedure Laterality Date    APPENDECTOMY      BREAST BIOPSY  x3    CATARACT EXTRACTION  1993/2000    od/os    COLONOSCOPY      ORBITAL FRACTURE SURGERY  12/98    os dr coleman    PARTIAL HYSTERECTOMY      RIB FRACTURE SURGERY      TONSILLECTOMY, ADENOIDECTOMY         Review of Systems   Constitutional: Positive for fever. Negative for chills.   HENT: Positive for postnasal drip, rhinorrhea, sinus pain and sinus pressure. Negative for congestion, ear pain, sneezing and sore throat.    Respiratory: Positive for cough.    All other systems reviewed and are negative.      Objective:   BP (!) 162/70 (BP Location: Right arm, Patient  "Position: Sitting, BP Method: Large (Manual))   Pulse 64   Temp 97.9 °F (36.6 °C) (Oral)   Ht 5' 5" (1.651 m)   Wt 90.2 kg (198 lb 15.4 oz)   SpO2 97%   BMI 33.11 kg/m²      Physical Exam   Constitutional: She is oriented to person, place, and time. She appears well-developed and well-nourished. She is cooperative.   HENT:   Head: Normocephalic and atraumatic.   Right Ear: Hearing, tympanic membrane, external ear and ear canal normal.   Left Ear: Hearing, tympanic membrane, external ear and ear canal normal.   Nose: Rhinorrhea present. Right sinus exhibits maxillary sinus tenderness. Left sinus exhibits maxillary sinus tenderness.   Mouth/Throat: Uvula is midline and mucous membranes are normal. No oropharyngeal exudate, posterior oropharyngeal edema or posterior oropharyngeal erythema.   Cardiovascular: Normal rate and regular rhythm.    Pulmonary/Chest: Effort normal and breath sounds normal. No respiratory distress. She has no decreased breath sounds. She has no wheezes. She has no rhonchi. She has no rales.   Lymphadenopathy:     She has no cervical adenopathy.   Neurological: She is alert and oriented to person, place, and time.   Skin: Skin is warm, dry and intact. She is not diaphoretic. No pallor.   Psychiatric: She has a normal mood and affect. Her speech is normal and behavior is normal.   Vitals reviewed.      Assessment:       1. Bacterial sinusitis        Plan:       Char was seen today for uri.    Diagnoses and all orders for this visit:    Bacterial sinusitis  -     fluticasone (FLONASE) 50 mcg/actuation nasal spray; 2 sprays (100 mcg total) by Each Nare route once daily.  -     levocetirizine (XYZAL) 5 MG tablet; Take 1 tablet (5 mg total) by mouth every evening.  -     benzonatate (TESSALON) 200 MG capsule; Take 1 capsule (200 mg total) by mouth 3 (three) times daily as needed for Cough.  -     clindamycin (CLEOCIN) 300 MG capsule; Take 1 capsule (300 mg total) by mouth every 8 (eight) " hours.  · Drink plenty of water, hot tea, and other liquids. This may help thin mucus. It also may promote sinus drainage.  · Heat may help soothe painful areas of the face. Use a towel soaked in hot water. Or,  the shower and direct the hot spray onto your face. Using a vaporizer along with a menthol rub at night may also help.   · An expectorant containing guaifenesin may help thin the mucus and promote drainage from the sinuses.  · Over-the-counter decongestants may be used unless a similar medicine was prescribed. Nasal sprays work the fastest. Use one that contains phenylephrine or oxymetazoline. First blow the nose gently. Then use the spray. Do not use these medicines more often than directed on the label or symptoms may get worse. You may also use tablets containing pseudoephedrine. Avoid products that combine ingredients, because side effects may be increased. Read labels. You can also ask the pharmacist for help. (NOTE: Persons with high blood pressure should not use decongestants. They can raise blood pressure.)  · Over-the-counter antihistamines may help if allergies contributed to your sinusitis.    · Use acetaminophen or ibuprofen to control pain, unless another pain medicine was prescribed. (If you have chronic liver or kidney disease or ever had a stomach ulcer, talk with your doctor before using these medicines. Aspirin should never be used in anyone under 18 years of age who is ill with a fever. It may cause severe liver damage.)  · Use nasal rinses or irrigation as instructed by your health care provider.  · Don't smoke. This can worsen symptoms.      Follow-up if symptoms worsen or fail to improve.

## 2018-01-23 NOTE — PATIENT INSTRUCTIONS

## 2018-05-09 DIAGNOSIS — I10 HYPERTENSION, BENIGN: ICD-10-CM

## 2018-05-09 DIAGNOSIS — K21.9 GASTROESOPHAGEAL REFLUX DISEASE WITHOUT ESOPHAGITIS: ICD-10-CM

## 2018-05-09 RX ORDER — HYDROCHLOROTHIAZIDE 25 MG/1
25 TABLET ORAL DAILY
Qty: 30 TABLET | Refills: 1 | Status: SHIPPED | OUTPATIENT
Start: 2018-05-09 | End: 2018-05-11 | Stop reason: SDUPTHER

## 2018-05-09 RX ORDER — IRBESARTAN 150 MG/1
150 TABLET ORAL NIGHTLY
Qty: 30 TABLET | Refills: 1 | Status: SHIPPED | OUTPATIENT
Start: 2018-05-09 | End: 2018-05-11 | Stop reason: SDUPTHER

## 2018-05-09 RX ORDER — METOPROLOL TARTRATE 100 MG/1
100 TABLET ORAL 2 TIMES DAILY
Qty: 60 TABLET | Refills: 1 | Status: SHIPPED | OUTPATIENT
Start: 2018-05-09 | End: 2018-05-11 | Stop reason: SDUPTHER

## 2018-05-09 NOTE — TELEPHONE ENCOUNTER
----- Message from Trudy Shah sent at 5/9/2018 10:23 AM CDT -----  Contact: Self  Pt calling to speak to a nurse regarding pt meds. [phone].

## 2018-05-09 NOTE — TELEPHONE ENCOUNTER
Patient states she is almost out of her meds and would like to know the status of previous medication refill request. Please advise.

## 2018-05-11 DIAGNOSIS — K21.9 GASTROESOPHAGEAL REFLUX DISEASE WITHOUT ESOPHAGITIS: ICD-10-CM

## 2018-05-11 DIAGNOSIS — I10 HYPERTENSION, BENIGN: ICD-10-CM

## 2018-05-11 RX ORDER — METOPROLOL TARTRATE 100 MG/1
100 TABLET ORAL 2 TIMES DAILY
Qty: 60 TABLET | Refills: 1 | Status: SHIPPED | OUTPATIENT
Start: 2018-05-11 | End: 2018-06-07 | Stop reason: SDUPTHER

## 2018-05-11 RX ORDER — HYDROCHLOROTHIAZIDE 25 MG/1
25 TABLET ORAL DAILY
Qty: 30 TABLET | Refills: 1 | Status: SHIPPED | OUTPATIENT
Start: 2018-05-11 | End: 2018-06-06 | Stop reason: SDUPTHER

## 2018-05-11 RX ORDER — IRBESARTAN 150 MG/1
150 TABLET ORAL NIGHTLY
Qty: 30 TABLET | Refills: 1 | Status: SHIPPED | OUTPATIENT
Start: 2018-05-11 | End: 2018-06-06 | Stop reason: SDUPTHER

## 2018-05-15 DIAGNOSIS — E11.9 TYPE 2 DIABETES MELLITUS WITHOUT COMPLICATION, WITHOUT LONG-TERM CURRENT USE OF INSULIN: ICD-10-CM

## 2018-05-16 RX ORDER — METFORMIN HYDROCHLORIDE 500 MG/1
500 TABLET, EXTENDED RELEASE ORAL
Qty: 30 TABLET | Refills: 0 | Status: SHIPPED | OUTPATIENT
Start: 2018-05-16 | End: 2018-06-06 | Stop reason: SDUPTHER

## 2018-06-06 ENCOUNTER — LAB VISIT (OUTPATIENT)
Dept: LAB | Facility: HOSPITAL | Age: 78
End: 2018-06-06
Attending: FAMILY MEDICINE
Payer: MEDICARE

## 2018-06-06 ENCOUNTER — OFFICE VISIT (OUTPATIENT)
Dept: FAMILY MEDICINE | Facility: CLINIC | Age: 78
End: 2018-06-06
Payer: MEDICARE

## 2018-06-06 VITALS
SYSTOLIC BLOOD PRESSURE: 110 MMHG | DIASTOLIC BLOOD PRESSURE: 72 MMHG | HEIGHT: 65 IN | HEART RATE: 60 BPM | TEMPERATURE: 98 F | WEIGHT: 196.88 LBS | OXYGEN SATURATION: 97 % | BODY MASS INDEX: 32.8 KG/M2

## 2018-06-06 DIAGNOSIS — I10 HYPERTENSION, BENIGN: ICD-10-CM

## 2018-06-06 DIAGNOSIS — I10 HYPERTENSION, BENIGN: Primary | ICD-10-CM

## 2018-06-06 DIAGNOSIS — E78.5 HYPERLIPIDEMIA, UNSPECIFIED HYPERLIPIDEMIA TYPE: ICD-10-CM

## 2018-06-06 DIAGNOSIS — K21.9 GASTROESOPHAGEAL REFLUX DISEASE WITHOUT ESOPHAGITIS: ICD-10-CM

## 2018-06-06 DIAGNOSIS — Z12.31 ENCOUNTER FOR SCREENING MAMMOGRAM FOR BREAST CANCER: ICD-10-CM

## 2018-06-06 DIAGNOSIS — I12.9 BENIGN HYPERTENSION WITH CHRONIC KIDNEY DISEASE, STAGE III: ICD-10-CM

## 2018-06-06 DIAGNOSIS — E11.9 TYPE 2 DIABETES MELLITUS WITHOUT COMPLICATION, WITHOUT LONG-TERM CURRENT USE OF INSULIN: ICD-10-CM

## 2018-06-06 DIAGNOSIS — N18.30 BENIGN HYPERTENSION WITH CHRONIC KIDNEY DISEASE, STAGE III: ICD-10-CM

## 2018-06-06 LAB
ALBUMIN SERPL BCP-MCNC: 4 G/DL
ALP SERPL-CCNC: 96 U/L
ALT SERPL W/O P-5'-P-CCNC: 12 U/L
ANION GAP SERPL CALC-SCNC: 9 MMOL/L
AST SERPL-CCNC: 17 U/L
BASOPHILS # BLD AUTO: 0.09 K/UL
BASOPHILS NFR BLD: 1.1 %
BILIRUB SERPL-MCNC: 1 MG/DL
BUN SERPL-MCNC: 28 MG/DL
CALCIUM SERPL-MCNC: 10.5 MG/DL
CHLORIDE SERPL-SCNC: 104 MMOL/L
CHOLEST SERPL-MCNC: 271 MG/DL
CHOLEST/HDLC SERPL: 4.3 {RATIO}
CO2 SERPL-SCNC: 29 MMOL/L
CREAT SERPL-MCNC: 1.5 MG/DL
DIFFERENTIAL METHOD: NORMAL
EOSINOPHIL # BLD AUTO: 0.2 K/UL
EOSINOPHIL NFR BLD: 1.8 %
ERYTHROCYTE [DISTWIDTH] IN BLOOD BY AUTOMATED COUNT: 13.4 %
EST. GFR  (AFRICAN AMERICAN): 37.9 ML/MIN/1.73 M^2
EST. GFR  (NON AFRICAN AMERICAN): 32.9 ML/MIN/1.73 M^2
ESTIMATED AVG GLUCOSE: 131 MG/DL
GLUCOSE SERPL-MCNC: 132 MG/DL
HBA1C MFR BLD HPLC: 6.2 %
HCT VFR BLD AUTO: 43.8 %
HDLC SERPL-MCNC: 63 MG/DL
HDLC SERPL: 23.2 %
HGB BLD-MCNC: 14 G/DL
IMM GRANULOCYTES # BLD AUTO: 0.04 K/UL
IMM GRANULOCYTES NFR BLD AUTO: 0.5 %
LDLC SERPL CALC-MCNC: 176 MG/DL
LYMPHOCYTES # BLD AUTO: 3.1 K/UL
LYMPHOCYTES NFR BLD: 36.5 %
MCH RBC QN AUTO: 28.9 PG
MCHC RBC AUTO-ENTMCNC: 32 G/DL
MCV RBC AUTO: 90 FL
MONOCYTES # BLD AUTO: 1 K/UL
MONOCYTES NFR BLD: 11.3 %
NEUTROPHILS # BLD AUTO: 4.2 K/UL
NEUTROPHILS NFR BLD: 48.8 %
NONHDLC SERPL-MCNC: 208 MG/DL
NRBC BLD-RTO: 0 /100 WBC
PLATELET # BLD AUTO: 289 K/UL
PMV BLD AUTO: 10.7 FL
POTASSIUM SERPL-SCNC: 6.1 MMOL/L
PROT SERPL-MCNC: 7.8 G/DL
RBC # BLD AUTO: 4.85 M/UL
SODIUM SERPL-SCNC: 142 MMOL/L
TRIGL SERPL-MCNC: 160 MG/DL
WBC # BLD AUTO: 8.52 K/UL

## 2018-06-06 PROCEDURE — 80061 LIPID PANEL: CPT

## 2018-06-06 PROCEDURE — 99999 PR PBB SHADOW E&M-EST. PATIENT-LVL III: CPT | Mod: PBBFAC,,, | Performed by: FAMILY MEDICINE

## 2018-06-06 PROCEDURE — 3078F DIAST BP <80 MM HG: CPT | Mod: CPTII,S$GLB,, | Performed by: FAMILY MEDICINE

## 2018-06-06 PROCEDURE — 85025 COMPLETE CBC W/AUTO DIFF WBC: CPT

## 2018-06-06 PROCEDURE — 83036 HEMOGLOBIN GLYCOSYLATED A1C: CPT

## 2018-06-06 PROCEDURE — 3074F SYST BP LT 130 MM HG: CPT | Mod: CPTII,S$GLB,, | Performed by: FAMILY MEDICINE

## 2018-06-06 PROCEDURE — 99214 OFFICE O/P EST MOD 30 MIN: CPT | Mod: S$GLB,,, | Performed by: FAMILY MEDICINE

## 2018-06-06 PROCEDURE — 36415 COLL VENOUS BLD VENIPUNCTURE: CPT | Mod: PO

## 2018-06-06 PROCEDURE — 80053 COMPREHEN METABOLIC PANEL: CPT

## 2018-06-06 RX ORDER — METFORMIN HYDROCHLORIDE 500 MG/1
500 TABLET, EXTENDED RELEASE ORAL
Qty: 90 TABLET | Refills: 3 | Status: SHIPPED | OUTPATIENT
Start: 2018-06-06 | End: 2018-10-16

## 2018-06-06 RX ORDER — MULTIVITAMIN
1 TABLET ORAL DAILY
Status: CANCELLED | COMMUNITY
Start: 2018-06-06

## 2018-06-06 RX ORDER — IRBESARTAN 150 MG/1
150 TABLET ORAL NIGHTLY
Qty: 90 TABLET | Refills: 3 | Status: SHIPPED | OUTPATIENT
Start: 2018-06-06 | End: 2019-01-10

## 2018-06-06 RX ORDER — HYDROCHLOROTHIAZIDE 25 MG/1
25 TABLET ORAL DAILY
Qty: 90 TABLET | Refills: 3 | Status: SHIPPED | OUTPATIENT
Start: 2018-06-06 | End: 2019-06-05 | Stop reason: SDUPTHER

## 2018-06-06 NOTE — PROGRESS NOTES
Assessment & Plan  Problem List Items Addressed This Visit        Unprioritized    Benign hypertension with chronic kidney disease, stage III    Current Assessment & Plan     Pt is currently stable on medication regimen.  Continue current therapy as scheduled.  Contact office with any questions about adjustments on medications.            Hyperlipidemia    Overview     Cannot tolerate pravastatin nor crestor.  Will focus on diet changes to improve her control.          Current Assessment & Plan     Cannot tolerate statins          Hypertension, benign - Primary    Relevant Medications    hydroCHLOROthiazide (HYDRODIURIL) 25 MG tablet    irbesartan (AVAPRO) 150 MG tablet    Other Relevant Orders    Hemoglobin A1c    CBC auto differential    Comprehensive metabolic panel    Lipid panel    Type 2 diabetes mellitus without complication, without long-term current use of insulin    Overview     Cannot tolerate metformin          Current Assessment & Plan     She is tolerating extended release metformin.           Relevant Medications    metFORMIN (GLUCOPHAGE-XR) 500 MG 24 hr tablet    Other Relevant Orders    Hemoglobin A1c      Other Visit Diagnoses     Gastroesophageal reflux disease without esophagitis        Relevant Medications    ranitidine (ZANTAC) 150 MG tablet    Encounter for screening mammogram for breast cancer        Relevant Orders    Mammo Digital Screening B[facility] With [facility]            Health Maintenance reviewed.      Follow-up: Follow-up in about 6 months (around 12/6/2018).    ______________________________________________________________________    Chief Complaint  Chief Complaint   Patient presents with    Diabetes    Medication Refill       HPI  Char Savage is a 77 y.o. female with multiple medical diagnoses as listed in the medical history and problem list that presents for follow on her diabets.  Pt is known to me with last appointment 10/17/2017.      We completed her foot exam.  She denies  any hypoglycemic episodes.  She is tolerating extended release metformin.  She cannot tolerate statins as she feels worse with the medication with flu like symptoms.  Patient denies any new symptoms including chest pain, SOB, blurry vision, N/V, diarrhea.    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    Nevus of choroid     od    Type 2 diabetes mellitus without complication 10/17/2017       PAST SURGICAL HISTORY:  Past Surgical History:   Procedure Laterality Date    APPENDECTOMY      BREAST BIOPSY  x3    CATARACT EXTRACTION  1993/2000    od/os    COLONOSCOPY      ORBITAL FRACTURE SURGERY  12/98    os dr coleman    PARTIAL HYSTERECTOMY      RIB FRACTURE SURGERY      TONSILLECTOMY, ADENOIDECTOMY         SOCIAL HISTORY:  Social History     Social History    Marital status:      Spouse name: N/A    Number of children: N/A    Years of education: N/A     Occupational History    Not on file.     Social History Main Topics    Smoking status: Never Smoker    Smokeless tobacco: Never Used    Alcohol use No    Drug use: No    Sexual activity: Not on file     Other Topics Concern    Not on file     Social History Narrative    No narrative on file       FAMILY HISTORY:  Family History   Problem Relation Age of Onset    Cancer Mother     Cataracts Father     Hypertension Father     No Known Problems Sister     No Known Problems Brother     No Known Problems Maternal Aunt     No Known Problems Maternal Uncle     No Known Problems Paternal Aunt     No Known Problems Paternal Uncle     No Known Problems Maternal Grandmother     No Known Problems Maternal Grandfather     No Known Problems Paternal Grandmother     No Known Problems Paternal Grandfather     Amblyopia Neg Hx     Blindness Neg Hx     Diabetes Neg Hx     Glaucoma Neg Hx     Macular degeneration Neg Hx     Retinal  detachment Neg Hx     Strabismus Neg Hx     Stroke Neg Hx     Thyroid disease Neg Hx        ALLERGIES AND MEDICATIONS: updated and reviewed.  Review of patient's allergies indicates:   Allergen Reactions    Amoxicillin Other (See Comments)    Bactrim [sulfamethoxazole-trimethoprim] Other (See Comments)    Darvon [propoxyphene] Other (See Comments)    Statins-hmg-coa reductase inhibitors      Flu symptoms    Toradol [ketorolac] Other (See Comments)    Vibramycin [doxycycline calcium] Rash     Current Outpatient Prescriptions   Medication Sig Dispense Refill    ascorbic acid (VITAMIN C) 100 MG tablet Take 100 mg by mouth once daily.        aspirin (ECOTRIN) 81 MG EC tablet Take 81 mg by mouth once daily.        b complex vitamins tablet Take 1 tablet by mouth once daily.      hydroCHLOROthiazide (HYDRODIURIL) 25 MG tablet Take 1 tablet (25 mg total) by mouth once daily. 90 tablet 3    irbesartan (AVAPRO) 150 MG tablet Take 1 tablet (150 mg total) by mouth every evening. 90 tablet 3    metFORMIN (GLUCOPHAGE-XR) 500 MG 24 hr tablet Take 1 tablet (500 mg total) by mouth daily with breakfast. 90 tablet 3    metoprolol tartrate (LOPRESSOR) 100 MG tablet Take 1 tablet (100 mg total) by mouth 2 (two) times daily. 60 tablet 1    multivitamin (ONE DAILY MULTIVITAMIN) per tablet Take 1 tablet by mouth once daily.      ranitidine (ZANTAC) 150 MG tablet Take 1 tablet (150 mg total) by mouth 2 (two) times daily. 180 tablet 3     No current facility-administered medications for this visit.          ROS  Review of Systems   Constitutional: Negative for activity change, appetite change, fatigue, fever and unexpected weight change.   HENT: Negative.  Negative for ear discharge, ear pain, rhinorrhea and sore throat.    Eyes: Negative.    Respiratory: Negative for apnea, cough, chest tightness, shortness of breath and wheezing.    Cardiovascular: Negative for chest pain, palpitations and leg swelling.  "  Gastrointestinal: Negative for abdominal distention, abdominal pain, constipation, diarrhea and vomiting.   Endocrine: Negative for cold intolerance, heat intolerance, polydipsia and polyuria.   Genitourinary: Negative for decreased urine volume, menstrual problem, urgency, vaginal bleeding, vaginal discharge and vaginal pain.   Musculoskeletal: Positive for myalgias (with statin use).   Skin: Negative for rash.   Neurological: Negative for dizziness and headaches.   Hematological: Does not bruise/bleed easily.   Psychiatric/Behavioral: Negative for agitation, sleep disturbance and suicidal ideas.         Physical Exam  Vitals:    06/06/18 0848   BP: 110/72   BP Location: Left arm   Patient Position: Sitting   BP Method: Large (Manual)   Pulse: 60   Temp: 98.4 °F (36.9 °C)   TempSrc: Oral   SpO2: 97%   Weight: 89.3 kg (196 lb 13.9 oz)   Height: 5' 5" (1.651 m)    Body mass index is 32.76 kg/m².  Weight: 89.3 kg (196 lb 13.9 oz)   Height: 5' 5" (165.1 cm)   Physical Exam   Constitutional: She is oriented to person, place, and time. She appears well-developed and well-nourished.   HENT:   Head: Normocephalic.   Right Ear: External ear normal.   Left Ear: External ear normal.   Nose: Nose normal.   Mouth/Throat: Oropharynx is clear and moist.   Eyes: Conjunctivae and EOM are normal. Pupils are equal, round, and reactive to light.   Cardiovascular: Normal rate, regular rhythm and normal heart sounds.    Pulmonary/Chest: Effort normal and breath sounds normal.   Neurological: She is alert and oriented to person, place, and time.   Skin: Skin is warm and dry.   Vitals reviewed.      Protective Sensation (w/ 10 gram monofilament):  Right: Intact  Left: Intact    Visual Inspection:  Normal -  Bilateral    Pedal Pulses:   Right: Present  Left: Present    Posterior tibialis:   Right:Present  Left: Present      Health Maintenance       Date Due Completion Date    Foot Exam 08/03/1950 ---    TETANUS VACCINE 08/03/1956 ---    " Zoster Vaccine 08/03/1998 ---    Hemoglobin A1c 04/17/2018 10/17/2017    Influenza Vaccine 08/01/2018 10/12/2017    Override on 10/12/2017: Done    Eye Exam 10/10/2018 10/10/2017    Override on 10/10/2017: Done    Lipid Panel 10/17/2018 10/17/2017    DEXA SCAN 10/30/2020 10/30/2017

## 2018-06-07 ENCOUNTER — TELEPHONE (OUTPATIENT)
Dept: FAMILY MEDICINE | Facility: CLINIC | Age: 78
End: 2018-06-07

## 2018-06-07 DIAGNOSIS — I10 HYPERTENSION, BENIGN: ICD-10-CM

## 2018-06-07 DIAGNOSIS — N28.9 RENAL INSUFFICIENCY: Primary | ICD-10-CM

## 2018-06-07 RX ORDER — METOPROLOL TARTRATE 100 MG/1
100 TABLET ORAL 2 TIMES DAILY
Qty: 180 TABLET | Refills: 1 | Status: SHIPPED | OUTPATIENT
Start: 2018-06-07 | End: 2019-01-02 | Stop reason: SDUPTHER

## 2018-06-07 NOTE — TELEPHONE ENCOUNTER
She will need to hold Metformin for 6 weeks. I have ordered blood work she will need to complete in 6 weeks

## 2018-06-07 NOTE — TELEPHONE ENCOUNTER
Patient advised of message.    Wanted to know how long should she hold the Metformin     Requesting a refill on Metoprolol.

## 2018-06-07 NOTE — TELEPHONE ENCOUNTER
----- Message from Di Perez MD sent at 6/7/2018  7:40 AM CDT -----  Please call an notify patient that I want her to hold Metformin.  She is very dehydrated and need to get in more water.  Her kidneys have slight decreased and metformin may be causing this issue.

## 2018-06-07 NOTE — PROGRESS NOTES
Please call an notify patient that I want her to hold Metformin.  She is very dehydrated and need to get in more water.  Her kidneys have slight decreased and metformin may be causing this issue.

## 2018-06-19 ENCOUNTER — HOSPITAL ENCOUNTER (OUTPATIENT)
Dept: RADIOLOGY | Facility: HOSPITAL | Age: 78
Discharge: HOME | End: 2018-06-19
Attending: FAMILY MEDICINE
Payer: MEDICARE

## 2018-06-19 DIAGNOSIS — Z12.31 ENCOUNTER FOR SCREENING MAMMOGRAM FOR BREAST CANCER: ICD-10-CM

## 2018-06-19 PROCEDURE — 77067 SCR MAMMO BI INCL CAD: CPT | Mod: 26,,, | Performed by: RADIOLOGY

## 2018-06-19 PROCEDURE — 77067 SCR MAMMO BI INCL CAD: CPT | Mod: TC

## 2018-06-19 PROCEDURE — 77063 BREAST TOMOSYNTHESIS BI: CPT | Mod: 26,,, | Performed by: RADIOLOGY

## 2018-07-17 ENCOUNTER — LAB VISIT (OUTPATIENT)
Dept: LAB | Facility: HOSPITAL | Age: 78
End: 2018-07-17
Attending: FAMILY MEDICINE
Payer: MEDICARE

## 2018-07-17 DIAGNOSIS — N28.9 RENAL INSUFFICIENCY: ICD-10-CM

## 2018-07-17 LAB
ALBUMIN SERPL BCP-MCNC: 3.8 G/DL
ALP SERPL-CCNC: 97 U/L
ALT SERPL W/O P-5'-P-CCNC: 14 U/L
ANION GAP SERPL CALC-SCNC: 10 MMOL/L
AST SERPL-CCNC: 17 U/L
BILIRUB SERPL-MCNC: 0.9 MG/DL
BUN SERPL-MCNC: 29 MG/DL
CALCIUM SERPL-MCNC: 10.1 MG/DL
CHLORIDE SERPL-SCNC: 103 MMOL/L
CO2 SERPL-SCNC: 28 MMOL/L
CREAT SERPL-MCNC: 1.4 MG/DL
EST. GFR  (AFRICAN AMERICAN): 41.2 ML/MIN/1.73 M^2
EST. GFR  (NON AFRICAN AMERICAN): 35.8 ML/MIN/1.73 M^2
GLUCOSE SERPL-MCNC: 112 MG/DL
POTASSIUM SERPL-SCNC: 5 MMOL/L
PROT SERPL-MCNC: 7.4 G/DL
SODIUM SERPL-SCNC: 141 MMOL/L

## 2018-07-17 PROCEDURE — 36415 COLL VENOUS BLD VENIPUNCTURE: CPT | Mod: PO

## 2018-07-17 PROCEDURE — 80053 COMPREHEN METABOLIC PANEL: CPT

## 2018-07-24 ENCOUNTER — TELEPHONE (OUTPATIENT)
Dept: FAMILY MEDICINE | Facility: CLINIC | Age: 78
End: 2018-07-24

## 2018-07-24 NOTE — TELEPHONE ENCOUNTER
Patient was taken off her metformin because she was told that she was dehydrated but received a letter regarding her blood work. She wanted to know if she needs to stay off of her metformin. Please advise      Your results are within an acceptable range. Your renal function has improved. Please continue your current therapy

## 2018-07-24 NOTE — TELEPHONE ENCOUNTER
She needs to stay off of metformin.  Her kidney function has improved, but not enough to tolerate metformin.  She will need to continue to stay off this medication.

## 2018-07-24 NOTE — TELEPHONE ENCOUNTER
----- Message from Susanna Nathan sent at 7/24/2018  1:07 PM CDT -----  Contact: self  Pt calling to discuss medications. Please call [phone]

## 2018-10-16 ENCOUNTER — OFFICE VISIT (OUTPATIENT)
Dept: OPTOMETRY | Facility: CLINIC | Age: 78
End: 2018-10-16
Payer: MEDICARE

## 2018-10-16 DIAGNOSIS — H52.4 MYOPIA WITH ASTIGMATISM AND PRESBYOPIA, BILATERAL: ICD-10-CM

## 2018-10-16 DIAGNOSIS — H35.3131 EARLY DRY STAGE NONEXUDATIVE AGE-RELATED MACULAR DEGENERATION OF BOTH EYES: ICD-10-CM

## 2018-10-16 DIAGNOSIS — H52.13 MYOPIA WITH ASTIGMATISM AND PRESBYOPIA, BILATERAL: ICD-10-CM

## 2018-10-16 DIAGNOSIS — E11.9 DIABETES MELLITUS TYPE 2 WITHOUT RETINOPATHY: Primary | ICD-10-CM

## 2018-10-16 DIAGNOSIS — Z13.5 GLAUCOMA SCREENING: ICD-10-CM

## 2018-10-16 DIAGNOSIS — H52.203 MYOPIA WITH ASTIGMATISM AND PRESBYOPIA, BILATERAL: ICD-10-CM

## 2018-10-16 DIAGNOSIS — Z96.1 PSEUDOPHAKIA OF BOTH EYES: ICD-10-CM

## 2018-10-16 PROCEDURE — 92015 DETERMINE REFRACTIVE STATE: CPT | Mod: ,,, | Performed by: OPTOMETRIST

## 2018-10-16 PROCEDURE — 99999 PR PBB SHADOW E&M-EST. PATIENT-LVL II: CPT | Mod: PBBFAC,,, | Performed by: OPTOMETRIST

## 2018-10-16 PROCEDURE — 99499 UNLISTED E&M SERVICE: CPT | Mod: S$GLB,,, | Performed by: OPTOMETRIST

## 2018-10-16 PROCEDURE — 99212 OFFICE O/P EST SF 10 MIN: CPT | Mod: PBBFAC,PO | Performed by: OPTOMETRIST

## 2018-10-16 PROCEDURE — 92014 COMPRE OPH EXAM EST PT 1/>: CPT | Mod: S$PBB,,, | Performed by: OPTOMETRIST

## 2018-10-16 NOTE — PROGRESS NOTES
HPI     DSL- 10/10/17     78 y.o female is here Diabetic eye exam. Pt c/o of blurry Va   distance/near. Pt has eye allergies. Pt denies glare, floaters and   flashes.     Eyemeds  At's OU PRNs    Hemoglobin A1C       Date                     Value               Ref Range             Status                06/06/2018               6.2 (H)             4.0 - 5.6 %           Final                  Last edited by Salomon Rose on 10/16/2018  9:27 AM. (History)            Assessment /Plan     For exam results, see Encounter Report.    Diabetes mellitus type 2 without retinopathy  -No retinopathy noted today.  Continued control with primary care physician and annual comprehensive eye exam.    Early dry stage nonexudative age-related macular degeneration of both eyes  -Home amsler  -annual dFE    Glaucoma screening  -Monitor with annual eye exam and IOP check    Pseudophakia of both eyes  -clear, centered    Myopia with astigmatism and presbyopia, bilateral  Eyeglass Final Rx     Eyeglass Final Rx       Sphere Cylinder Axis Dist VA Add    Right -2.25 +3.00 010 20/25 +2.50    Left -1.25 +3.00 165 20/25-- +2.50    Type:  PAL    Expiration Date:  10/17/2019                  RTC 1 yr

## 2018-11-15 ENCOUNTER — OFFICE VISIT (OUTPATIENT)
Dept: FAMILY MEDICINE | Facility: CLINIC | Age: 78
End: 2018-11-15
Payer: MEDICARE

## 2018-11-15 ENCOUNTER — LAB VISIT (OUTPATIENT)
Dept: LAB | Facility: HOSPITAL | Age: 78
End: 2018-11-15
Attending: INTERNAL MEDICINE
Payer: MEDICARE

## 2018-11-15 ENCOUNTER — TELEPHONE (OUTPATIENT)
Dept: FAMILY MEDICINE | Facility: CLINIC | Age: 78
End: 2018-11-15

## 2018-11-15 DIAGNOSIS — M15.9 PRIMARY OSTEOARTHRITIS INVOLVING MULTIPLE JOINTS: ICD-10-CM

## 2018-11-15 DIAGNOSIS — N18.30 CHRONIC KIDNEY DISEASE, STAGE III (MODERATE): ICD-10-CM

## 2018-11-15 DIAGNOSIS — I12.9 BENIGN HYPERTENSION WITH CHRONIC KIDNEY DISEASE, STAGE III: ICD-10-CM

## 2018-11-15 DIAGNOSIS — I10 HYPERTENSION, BENIGN: ICD-10-CM

## 2018-11-15 DIAGNOSIS — E11.9 TYPE 2 DIABETES MELLITUS WITHOUT COMPLICATION, WITHOUT LONG-TERM CURRENT USE OF INSULIN: Primary | ICD-10-CM

## 2018-11-15 DIAGNOSIS — N18.30 BENIGN HYPERTENSION WITH CHRONIC KIDNEY DISEASE, STAGE III: ICD-10-CM

## 2018-11-15 DIAGNOSIS — E78.5 HYPERLIPIDEMIA, UNSPECIFIED HYPERLIPIDEMIA TYPE: ICD-10-CM

## 2018-11-15 PROCEDURE — 99499 UNLISTED E&M SERVICE: CPT | Mod: S$GLB,,, | Performed by: FAMILY MEDICINE

## 2018-11-15 PROCEDURE — 99999 PR PBB SHADOW E&M-EST. PATIENT-LVL III: CPT | Mod: PBBFAC,,, | Performed by: FAMILY MEDICINE

## 2018-11-15 PROCEDURE — 3077F SYST BP >= 140 MM HG: CPT | Mod: CPTII,S$GLB,, | Performed by: FAMILY MEDICINE

## 2018-11-15 PROCEDURE — 3078F DIAST BP <80 MM HG: CPT | Mod: CPTII,S$GLB,, | Performed by: FAMILY MEDICINE

## 2018-11-15 PROCEDURE — 99214 OFFICE O/P EST MOD 30 MIN: CPT | Mod: S$GLB,,, | Performed by: FAMILY MEDICINE

## 2018-11-15 PROCEDURE — 1101F PT FALLS ASSESS-DOCD LE1/YR: CPT | Mod: CPTII,S$GLB,, | Performed by: FAMILY MEDICINE

## 2018-11-15 RX ORDER — MELOXICAM 15 MG/1
15 TABLET ORAL DAILY
Qty: 30 TABLET | Refills: 2 | Status: SHIPPED | OUTPATIENT
Start: 2018-11-15 | End: 2019-01-10

## 2018-11-15 NOTE — PROGRESS NOTES
Assessment & Plan  Problem List Items Addressed This Visit        Unprioritized    Benign hypertension with chronic kidney disease, stage III    Current Assessment & Plan     No changes at this time          Relevant Orders    CBC auto differential (Completed)    Comprehensive metabolic panel (Completed)    Lipid panel (Completed)    Hemoglobin A1c (Completed)    TSH (Completed)    Hyperlipidemia    Overview     Cannot tolerate pravastatin nor crestor.  Will focus on diet changes to improve her control.          Current Assessment & Plan     Patient is stable.  Assess and addressed all modifiable risk factors.  Continue with appropriate management to prevent complications.           Relevant Orders    CBC auto differential (Completed)    Comprehensive metabolic panel (Completed)    Lipid panel (Completed)    Hemoglobin A1c (Completed)    TSH (Completed)    Hypertension, benign    Current Assessment & Plan     Pt is currently stable on medication regimen.  Continue current therapy as scheduled.  Contact office with any questions about adjustments on medications.            Relevant Orders    CBC auto differential (Completed)    Comprehensive metabolic panel (Completed)    Lipid panel (Completed)    Hemoglobin A1c (Completed)    TSH (Completed)    Type 2 diabetes mellitus without complication, without long-term current use of insulin - Primary    Overview     Cannot tolerate metformin          Relevant Orders    CBC auto differential (Completed)    Comprehensive metabolic panel (Completed)    Lipid panel (Completed)    Hemoglobin A1c (Completed)    TSH (Completed)      Other Visit Diagnoses     Primary osteoarthritis involving multiple joints        Relevant Medications    meloxicam (MOBIC) 15 MG tablet            Health Maintenance reviewed.    Follow-up: Follow-up in about 6 months (around 5/15/2019).    ______________________________________________________________________    Chief Complaint  Chief Complaint    Patient presents with    Diabetes       HPI  Char Savage is a 78 y.o. female with multiple medical diagnoses as listed in the medical history and problem list that presents for diabetes.  Pt is known to me with last appointment 6/6/2018.      She denies any new issues at this time. We did discuss her medication regimen.  No recent falls.  Patient denies any new symptoms including chest pain, SOB, blurry vision, N/V, diarrhea.        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    Nevus of choroid     od    Type 2 diabetes mellitus without complication 10/17/2017       PAST SURGICAL HISTORY:  Past Surgical History:   Procedure Laterality Date    APPENDECTOMY      BREAST BIOPSY  x3    CATARACT EXTRACTION  1993/2000    od/os    COLONOSCOPY      COLONOSCOPY N/A 4/17/2013    Performed by Mat[facility] Pose[facility] at [facility] ([facility] FLR)    HYSTERECTOMY      ORBITAL FRACTURE SURGERY  12/98    os dr coleman    PARTIAL HYSTERECTOMY      RIB FRACTURE SURGERY      TONSILLECTOMY, ADENOIDECTOMY         SOCIAL HISTORY:  Social History     Socioeconomic History    Marital status:      Spouse name: Not on file    Number of children: Not on file    Years of education: Not on file    Highest education level: Not on file   Social Needs    Financial resource strain: Not on file    Food insecurity - worry: Not on file    Food insecurity - inability: Not on file    Transportation needs - medical: Not on file    Transportation needs - non-medical: Not on file   Occupational History    Not on file   Tobacco Use    Smoking status: Never Smoker    Smokeless tobacco: Never Used   Substance and Sexual Activity    Alcohol use: No     Alcohol/week: 0.0 oz    Drug use: No    Sexual activity: Not on file   Other Topics Concern    Not on file   Social History Narrative    Not on file       FAMILY  HISTORY:  Family History   Problem Relation Age of Onset    Cancer Mother     Breast cancer Mother     Cataracts Father     Hypertension Father     No Known Problems Sister     No Known Problems Brother     No Known Problems Maternal Aunt     No Known Problems Maternal Uncle     No Known Problems Paternal Aunt     No Known Problems Paternal Uncle     No Known Problems Maternal Grandmother     No Known Problems Maternal Grandfather     No Known Problems Paternal Grandmother     No Known Problems Paternal Grandfather     Amblyopia Neg Hx     Blindness Neg Hx     Diabetes Neg Hx     Glaucoma Neg Hx     Macular degeneration Neg Hx     Retinal detachment Neg Hx     Strabismus Neg Hx     Stroke Neg Hx     Thyroid disease Neg Hx        ALLERGIES AND MEDICATIONS: updated and reviewed.  Review of patient's allergies indicates:   Allergen Reactions    Amoxicillin Other (See Comments)    Bactrim [sulfamethoxazole-trimethoprim] Other (See Comments)    Darvon [propoxyphene] Other (See Comments)    Statins-hmg-coa reductase inhibitors      Flu symptoms    Toradol [ketorolac] Other (See Comments)    Vibramycin [doxycycline calcium] Rash     Current Outpatient Medications   Medication Sig Dispense Refill    ascorbic acid (VITAMIN C) 100 MG tablet Take 100 mg by mouth once daily.        aspirin (ECOTRIN) 81 MG EC tablet Take 81 mg by mouth once daily.        b complex vitamins tablet Take 1 tablet by mouth once daily.      hydroCHLOROthiazide (HYDRODIURIL) 25 MG tablet Take 1 tablet (25 mg total) by mouth once daily. 90 tablet 3    irbesartan (AVAPRO) 150 MG tablet Take 1 tablet (150 mg total) by mouth every evening. 90 tablet 3    metoprolol tartrate (LOPRESSOR) 100 MG tablet Take 1 tablet (100 mg total) by mouth 2 (two) times daily. 180 tablet 1    multivitamin (ONE DAILY MULTIVITAMIN) per tablet Take 1 tablet by mouth once daily.      ranitidine (ZANTAC) 150 MG tablet Take 1 tablet (150 mg  "total) by mouth 2 (two) times daily. 180 tablet 3    meloxicam (MOBIC) 15 MG tablet Take 1 tablet (15 mg total) by mouth once daily. 30 tablet 2     No current facility-administered medications for this visit.          ROS  Review of Systems   Constitutional: Negative for activity change, appetite change, fatigue, fever and unexpected weight change.   HENT: Negative.  Negative for ear discharge, ear pain, rhinorrhea and sore throat.    Eyes: Negative.    Respiratory: Negative for apnea, cough, chest tightness, shortness of breath and wheezing.    Cardiovascular: Negative for chest pain, palpitations and leg swelling.   Gastrointestinal: Negative for abdominal distention, abdominal pain, constipation, diarrhea and vomiting.   Endocrine: Negative for cold intolerance, heat intolerance, polydipsia and polyuria.   Genitourinary: Negative for decreased urine volume, menstrual problem, urgency, vaginal bleeding, vaginal discharge and vaginal pain.   Musculoskeletal: Negative.    Skin: Negative for rash.   Neurological: Negative for dizziness and headaches.   Hematological: Does not bruise/bleed easily.   Psychiatric/Behavioral: Negative for agitation, sleep disturbance and suicidal ideas.           Physical Exam  Vitals:    11/15/18 0847   BP: (!) 140/70   BP Location: Right arm   Patient Position: Sitting   BP Method: Large (Manual)   Pulse: 65   Temp: 98.6 °F (37 °C)   TempSrc: Oral   SpO2: 95%   Weight: 92.1 kg (203 lb 0.7 oz)   Height: 5' 5" (1.651 m)    Body mass index is 33.79 kg/m².  Weight: 92.1 kg (203 lb 0.7 oz)   Height: 5' 5" (165.1 cm)   Physical Exam   Constitutional: She is oriented to person, place, and time. She appears well-developed and well-nourished.   HENT:   Head: Normocephalic.   Right Ear: External ear normal.   Left Ear: External ear normal.   Nose: Nose normal.   Mouth/Throat: Oropharynx is clear and moist.   Eyes: Conjunctivae and EOM are normal. Pupils are equal, round, and reactive to light. "   Cardiovascular: Normal rate, regular rhythm and normal heart sounds.   Pulmonary/Chest: Effort normal and breath sounds normal.   Neurological: She is alert and oriented to person, place, and time.   Skin: Skin is warm and dry.   Vitals reviewed.      Health Maintenance       Date Due Completion Date    TETANUS VACCINE 08/[birth year]/1956 ---    Zoster Vaccine 08/03/1998 ---    Hemoglobin A1c 12/06/2018 6/6/2018    Foot Exam 06/06/2019 6/6/2018    Lipid Panel 06/06/2019 6/6/2018    Eye Exam 10/16/2019 10/16/2018    Override on 10/10/2017: Done    DEXA SCAN 10/30/2020 10/30/2017

## 2018-11-19 VITALS
HEIGHT: 65 IN | HEART RATE: 65 BPM | SYSTOLIC BLOOD PRESSURE: 130 MMHG | BODY MASS INDEX: 33.83 KG/M2 | OXYGEN SATURATION: 95 % | DIASTOLIC BLOOD PRESSURE: 76 MMHG | WEIGHT: 203.06 LBS | TEMPERATURE: 99 F

## 2018-11-27 ENCOUNTER — LAB VISIT (OUTPATIENT)
Dept: LAB | Facility: HOSPITAL | Age: 78
End: 2018-11-27
Attending: FAMILY MEDICINE
Payer: MEDICARE

## 2018-11-27 DIAGNOSIS — I10 HYPERTENSION, BENIGN: ICD-10-CM

## 2018-11-27 DIAGNOSIS — E11.9 TYPE 2 DIABETES MELLITUS WITHOUT COMPLICATION, WITHOUT LONG-TERM CURRENT USE OF INSULIN: ICD-10-CM

## 2018-11-27 DIAGNOSIS — E87.5 HYPERKALEMIA: ICD-10-CM

## 2018-11-27 LAB
ANION GAP SERPL CALC-SCNC: 9 MMOL/L
BUN SERPL-MCNC: 31 MG/DL
CALCIUM SERPL-MCNC: 10.2 MG/DL
CHLORIDE SERPL-SCNC: 103 MMOL/L
CO2 SERPL-SCNC: 28 MMOL/L
CREAT SERPL-MCNC: 1.4 MG/DL
EST. GFR  (AFRICAN AMERICAN): 40.9 ML/MIN/1.73 M^2
EST. GFR  (NON AFRICAN AMERICAN): 35.5 ML/MIN/1.73 M^2
GLUCOSE SERPL-MCNC: 118 MG/DL
POTASSIUM SERPL-SCNC: 5.4 MMOL/L
SODIUM SERPL-SCNC: 140 MMOL/L

## 2018-11-27 PROCEDURE — 36415 COLL VENOUS BLD VENIPUNCTURE: CPT | Mod: PO

## 2018-11-27 PROCEDURE — 80048 BASIC METABOLIC PNL TOTAL CA: CPT

## 2018-12-06 ENCOUNTER — TELEPHONE (OUTPATIENT)
Dept: FAMILY MEDICINE | Facility: CLINIC | Age: 78
End: 2018-12-06

## 2018-12-06 NOTE — TELEPHONE ENCOUNTER
Her potassium is still high.  She should not restart irbesartan.  She will need to get back into see me in January to address this elevated potassium.

## 2018-12-06 NOTE — TELEPHONE ENCOUNTER
----- Message from Bari Cooper sent at 12/6/2018  9:50 AM CST -----  Contact: Self/[phone]  The patient would like to speak to the staff regarding her test results.      Thank you

## 2019-01-02 DIAGNOSIS — I10 HYPERTENSION, BENIGN: ICD-10-CM

## 2019-01-02 RX ORDER — METOPROLOL TARTRATE 100 MG/1
100 TABLET ORAL 2 TIMES DAILY
Qty: 180 TABLET | Refills: 1 | Status: SHIPPED | OUTPATIENT
Start: 2019-01-02 | End: 2019-07-03 | Stop reason: SDUPTHER

## 2019-01-10 ENCOUNTER — OFFICE VISIT (OUTPATIENT)
Dept: FAMILY MEDICINE | Facility: CLINIC | Age: 79
End: 2019-01-10
Payer: MEDICARE

## 2019-01-10 VITALS
SYSTOLIC BLOOD PRESSURE: 140 MMHG | WEIGHT: 204.56 LBS | BODY MASS INDEX: 34.08 KG/M2 | OXYGEN SATURATION: 99 % | TEMPERATURE: 98 F | DIASTOLIC BLOOD PRESSURE: 90 MMHG | HEART RATE: 65 BPM | HEIGHT: 65 IN

## 2019-01-10 DIAGNOSIS — E11.9 TYPE 2 DIABETES MELLITUS WITHOUT COMPLICATION, WITHOUT LONG-TERM CURRENT USE OF INSULIN: ICD-10-CM

## 2019-01-10 DIAGNOSIS — E78.5 HYPERLIPIDEMIA, UNSPECIFIED HYPERLIPIDEMIA TYPE: ICD-10-CM

## 2019-01-10 DIAGNOSIS — E66.01 CLASS 2 SEVERE OBESITY DUE TO EXCESS CALORIES WITH SERIOUS COMORBIDITY AND BODY MASS INDEX (BMI) OF 35.0 TO 35.9 IN ADULT: ICD-10-CM

## 2019-01-10 DIAGNOSIS — I12.9 BENIGN HYPERTENSION WITH CHRONIC KIDNEY DISEASE, STAGE III: ICD-10-CM

## 2019-01-10 DIAGNOSIS — N18.30 BENIGN HYPERTENSION WITH CHRONIC KIDNEY DISEASE, STAGE III: ICD-10-CM

## 2019-01-10 DIAGNOSIS — E87.5 HYPERKALEMIA: Primary | ICD-10-CM

## 2019-01-10 PROCEDURE — 3077F PR MOST RECENT SYSTOLIC BLOOD PRESSURE >= 140 MM HG: ICD-10-PCS | Mod: CPTII,S$GLB,, | Performed by: FAMILY MEDICINE

## 2019-01-10 PROCEDURE — 99999 PR PBB SHADOW E&M-EST. PATIENT-LVL III: ICD-10-PCS | Mod: PBBFAC,,, | Performed by: FAMILY MEDICINE

## 2019-01-10 PROCEDURE — 99214 OFFICE O/P EST MOD 30 MIN: CPT | Mod: S$GLB,,, | Performed by: FAMILY MEDICINE

## 2019-01-10 PROCEDURE — 1101F PR PT FALLS ASSESS DOC 0-1 FALLS W/OUT INJ PAST YR: ICD-10-PCS | Mod: CPTII,S$GLB,, | Performed by: FAMILY MEDICINE

## 2019-01-10 PROCEDURE — 3080F DIAST BP >= 90 MM HG: CPT | Mod: CPTII,S$GLB,, | Performed by: FAMILY MEDICINE

## 2019-01-10 PROCEDURE — 99999 PR PBB SHADOW E&M-EST. PATIENT-LVL III: CPT | Mod: PBBFAC,,, | Performed by: FAMILY MEDICINE

## 2019-01-10 PROCEDURE — 3077F SYST BP >= 140 MM HG: CPT | Mod: CPTII,S$GLB,, | Performed by: FAMILY MEDICINE

## 2019-01-10 PROCEDURE — 1101F PT FALLS ASSESS-DOCD LE1/YR: CPT | Mod: CPTII,S$GLB,, | Performed by: FAMILY MEDICINE

## 2019-01-10 PROCEDURE — 99499 UNLISTED E&M SERVICE: CPT | Mod: S$GLB,,, | Performed by: FAMILY MEDICINE

## 2019-01-10 PROCEDURE — 99214 PR OFFICE/OUTPT VISIT, EST, LEVL IV, 30-39 MIN: ICD-10-PCS | Mod: S$GLB,,, | Performed by: FAMILY MEDICINE

## 2019-01-10 PROCEDURE — 99499 RISK ADDL DX/OHS AUDIT: ICD-10-PCS | Mod: S$GLB,,, | Performed by: FAMILY MEDICINE

## 2019-01-10 PROCEDURE — 3080F PR MOST RECENT DIASTOLIC BLOOD PRESSURE >= 90 MM HG: ICD-10-PCS | Mod: CPTII,S$GLB,, | Performed by: FAMILY MEDICINE

## 2019-01-10 RX ORDER — AMLODIPINE BESYLATE 2.5 MG/1
2.5 TABLET ORAL DAILY
Qty: 90 TABLET | Refills: 1 | Status: SHIPPED | OUTPATIENT
Start: 2019-01-10 | End: 2019-07-03 | Stop reason: SDUPTHER

## 2019-01-10 NOTE — ASSESSMENT & PLAN NOTE
Pt is currently stable on medication regimen.  Continue current therapy as scheduled.  Contact office with any questions about adjustments on medications.     Noted hyperkalemia.  Had to stop irbesartan

## 2019-01-10 NOTE — PROGRESS NOTES
Assessment & Plan  Problem List Items Addressed This Visit        Cardiac/Vascular    Hyperlipidemia    Overview     Cannot tolerate pravastatin nor crestor.  Will focus on diet changes to improve her control.          Current Assessment & Plan     Patient is stable.  Assess and addressed all modifiable risk factors.  Continue with appropriate management to prevent complications.              Renal/    Benign hypertension with chronic kidney disease, stage III    Overview     Failed irbesartan secondary to hyperkalemia          Current Assessment & Plan     Pt is currently stable on medication regimen.  Continue current therapy as scheduled.  Contact office with any questions about adjustments on medications.     Noted hyperkalemia.  Had to stop irbesartan            Relevant Medications    amLODIPine (NORVASC) 2.5 MG tablet       Endocrine    Type 2 diabetes mellitus without complication, without long-term current use of insulin    Overview     Cannot tolerate metformin          Current Assessment & Plan     Renal function cannot tolerate metformin at this time             Other    Class 2 severe obesity due to excess calories with serious comorbidity and body mass index (BMI) of 35.0 to 35.9 in adult    Current Assessment & Plan     The patient is asked to make an attempt to improve diet and exercise patterns to aid in medical management of this problem.    The patient's BMI has been recorded in the chart. The patient has been provided educational materials regarding the benefits of attaining and maintaining a normal weight. We will continue to address and follow this issue during follow up visits.             Other Visit Diagnoses     Hyperkalemia    -  Primary    Relevant Orders    Comprehensive metabolic panel       List of potassium rich foods were provided to the patient.  Decrease use of meloxicam to every other day.  Continue with medication for diabetic control.  Patient cannot tolerate metformin.   Recheck blood work in 2-3 weeks.      Health Maintenance reviewed.    Follow-up: No Follow-up on file.    ______________________________________________________________________    Chief Complaint  Chief Complaint   Patient presents with    discuss potassium level       HPI  Char Savage is a 78 y.o. female with multiple medical diagnoses as listed in the medical history and problem list that presents for potassium level.  Pt is known to me with last appointment 11/15/2018.    Patient presents in the office in order to discuss hyperkalemia.  She does have decreased renal function.  She does take meloxicam periodically for pain.  Patient states that she does eat a lot of foods that are rich in potassium.  I did discuss a list of foods that she would need to use sparingly.      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    Nevus of choroid     od    Type 2 diabetes mellitus without complication 10/17/2017       PAST SURGICAL HISTORY:  Past Surgical History:   Procedure Laterality Date    APPENDECTOMY      BREAST BIOPSY  x3    CATARACT EXTRACTION  1993/2000    od/os    COLONOSCOPY      COLONOSCOPY N/A 4/17/2013    Performed by [facility] at [facility] ([facility] F[facility])    HYSTERECTOMY      ORBITAL FRACTURE SURGERY  12/98    os dr coleman    PARTIAL HYSTERECTOMY      RIB FRACTURE SURGERY      TONSILLECTOMY, ADENOIDECTOMY         SOCIAL HISTORY:  Social History     Socioeconomic History    Marital status:      Spouse name: Not on file    Number of children: Not on file    Years of education: Not on file    Highest education level: Not on file   Social Needs    Financial resource strain: Not on file    Food insecurity - worry: Not on file    Food insecurity - inability: Not on file    Transportation needs - medical: Not on file    Transportation needs - non-medical: Not on file    Occupational History    Not on file   Tobacco Use    Smoking status: Never Smoker    Smokeless tobacco: Never Used   Substance and Sexual Activity    Alcohol use: No     Alcohol/week: 0.0 oz    Drug use: No    Sexual activity: Not on file   Other Topics Concern    Not on file   Social History Narrative    Not on file       FAMILY HISTORY:  Family History   Problem Relation Age of Onset    Cancer Mother     Breast cancer Mother     Cataracts Father     Hypertension Father     No Known Problems Sister     No Known Problems Brother     No Known Problems Maternal Aunt     No Known Problems Maternal Uncle     No Known Problems Paternal Aunt     No Known Problems Paternal Uncle     No Known Problems Maternal Grandmother     No Known Problems Maternal Grandfather     No Known Problems Paternal Grandmother     No Known Problems Paternal Grandfather     Amblyopia Neg Hx     Blindness Neg Hx     Diabetes Neg Hx     Glaucoma Neg Hx     Macular degeneration Neg Hx     Retinal detachment Neg Hx     Strabismus Neg Hx     Stroke Neg Hx     Thyroid disease Neg Hx        ALLERGIES AND MEDICATIONS: updated and reviewed.  Review of patient's allergies indicates:   Allergen Reactions    Amoxicillin Other (See Comments)    Bactrim [sulfamethoxazole-trimethoprim] Other (See Comments)    Darvon [propoxyphene] Other (See Comments)    Statins-hmg-coa reductase inhibitors      Flu symptoms    Toradol [ketorolac] Other (See Comments)    Vibramycin [doxycycline calcium] Rash     Current Outpatient Medications   Medication Sig Dispense Refill    ascorbic acid (VITAMIN C) 100 MG tablet Take 100 mg by mouth once daily.        aspirin (ECOTRIN) 81 MG EC tablet Take 81 mg by mouth once daily.        hydroCHLOROthiazide (HYDRODIURIL) 25 MG tablet Take 1 tablet (25 mg total) by mouth once daily. 90 tablet 3    metoprolol tartrate (LOPRESSOR) 100 MG tablet Take 1 tablet (100 mg total) by mouth 2 (two) times  "daily. 180 tablet 1    multivitamin (ONE DAILY MULTIVITAMIN) per tablet Take 1 tablet by mouth once daily.      ranitidine (ZANTAC) 150 MG tablet Take 1 tablet (150 mg total) by mouth 2 (two) times daily. 180 tablet 3    amLODIPine (NORVASC) 2.5 MG tablet Take 1 tablet (2.5 mg total) by mouth once daily. 90 tablet 1    b complex vitamins tablet Take 1 tablet by mouth once daily.      meloxicam (MOBIC) 15 MG tablet Take 15 mg by mouth once daily. Take one tablet every other day       No current facility-administered medications for this visit.          ROS  Review of Systems   Constitutional: Negative for activity change, appetite change, fatigue, fever and unexpected weight change.   HENT: Negative.  Negative for ear discharge, ear pain, rhinorrhea and sore throat.    Eyes: Negative.    Respiratory: Negative for apnea, cough, chest tightness, shortness of breath and wheezing.    Cardiovascular: Negative for chest pain, palpitations and leg swelling.   Gastrointestinal: Negative for abdominal distention, abdominal pain, constipation, diarrhea and vomiting.   Endocrine: Negative for cold intolerance, heat intolerance, polydipsia and polyuria.   Genitourinary: Negative for decreased urine volume and urgency.   Musculoskeletal: Negative.    Skin: Negative for rash.   Neurological: Negative for dizziness and headaches.   Hematological: Does not bruise/bleed easily.   Psychiatric/Behavioral: Negative for agitation, sleep disturbance and suicidal ideas.           Physical Exam  Vitals:    01/10/19 0956   BP: (!) 140/90   BP Location: Left arm   Patient Position: Sitting   BP Method: Large (Manual)   Pulse: 65   Temp: 97.9 °F (36.6 °C)   TempSrc: Oral   SpO2: 99%   Weight: 92.8 kg (204 lb 9.4 oz)   Height: 5' 5" (1.651 m)   PF: 99 L/min    Body mass index is 34.05 kg/m².  Weight: 92.8 kg (204 lb 9.4 oz)   Height: 5' 5" (165.1 cm)   Physical Exam   Constitutional: She is oriented to person, place, and time. She appears " well-developed and well-nourished.   HENT:   Head: Normocephalic.   Right Ear: External ear normal.   Left Ear: External ear normal.   Nose: Nose normal.   Mouth/Throat: Oropharynx is clear and moist.   Eyes: Conjunctivae and EOM are normal. Pupils are equal, round, and reactive to light.   Cardiovascular: Normal rate, regular rhythm and normal heart sounds.   Pulmonary/Chest: Effort normal and breath sounds normal.   Neurological: She is alert and oriented to person, place, and time.   Skin: Skin is warm and dry.   Vitals reviewed.        Health Maintenance       Date Due Completion Date    TETANUS VACCINE 08/[birth year]/1956 ---    Zoster Vaccine 08/03/1998 ---    Hemoglobin A1c 05/15/2019 11/15/2018    Foot Exam 06/06/2019 6/6/2018    Eye Exam 10/16/2019 10/16/2018    Override on 10/10/2017: Done    Lipid Panel 11/15/2019 11/15/2018    DEXA SCAN 10/30/2020 10/30/2017              Patient note was created using [employer].  Any errors in syntax or even information may not have been identified and edited on initial review prior to signing this note.

## 2019-01-11 ENCOUNTER — TELEPHONE (OUTPATIENT)
Dept: FAMILY MEDICINE | Facility: CLINIC | Age: 79
End: 2019-01-11

## 2019-01-11 RX ORDER — MELOXICAM 15 MG/1
15 TABLET ORAL DAILY
COMMUNITY
End: 2019-01-17 | Stop reason: SDUPTHER

## 2019-01-11 NOTE — ASSESSMENT & PLAN NOTE
The patient is asked to make an attempt to improve diet and exercise patterns to aid in medical management of this problem.    The patient's BMI has been recorded in the chart. The patient has been provided educational materials regarding the benefits of attaining and maintaining a normal weight. We will continue to address and follow this issue during follow up visits.

## 2019-01-11 NOTE — TELEPHONE ENCOUNTER
----- Message from Isaura Bobby sent at 1/10/2019  2:33 PM CST -----  Contact: self - [phone]  Pt asking a call back to find out something about a medication that she is taking. Pt declined to give specifics.

## 2019-01-21 RX ORDER — MELOXICAM 15 MG/1
15 TABLET ORAL DAILY
Qty: 30 TABLET | Refills: 0 | Status: SHIPPED | OUTPATIENT
Start: 2019-01-21 | End: 2019-03-04 | Stop reason: SDUPTHER

## 2019-01-24 ENCOUNTER — LAB VISIT (OUTPATIENT)
Dept: LAB | Facility: HOSPITAL | Age: 79
End: 2019-01-24
Attending: FAMILY MEDICINE
Payer: MEDICARE

## 2019-01-24 DIAGNOSIS — E87.5 HYPERKALEMIA: ICD-10-CM

## 2019-01-24 LAB
ALBUMIN SERPL BCP-MCNC: 3.7 G/DL
ALP SERPL-CCNC: 102 U/L
ALT SERPL W/O P-5'-P-CCNC: 12 U/L
ANION GAP SERPL CALC-SCNC: 8 MMOL/L
AST SERPL-CCNC: 19 U/L
BILIRUB SERPL-MCNC: 0.7 MG/DL
BUN SERPL-MCNC: 25 MG/DL
CALCIUM SERPL-MCNC: 9.8 MG/DL
CHLORIDE SERPL-SCNC: 103 MMOL/L
CO2 SERPL-SCNC: 31 MMOL/L
CREAT SERPL-MCNC: 1.2 MG/DL
EST. GFR  (AFRICAN AMERICAN): 49.3 ML/MIN/1.73 M^2
EST. GFR  (NON AFRICAN AMERICAN): 42.8 ML/MIN/1.73 M^2
GLUCOSE SERPL-MCNC: 95 MG/DL
POTASSIUM SERPL-SCNC: 4.6 MMOL/L
PROT SERPL-MCNC: 7.5 G/DL
SODIUM SERPL-SCNC: 142 MMOL/L

## 2019-01-24 PROCEDURE — 36415 COLL VENOUS BLD VENIPUNCTURE: CPT | Mod: PO

## 2019-01-24 PROCEDURE — 80053 COMPREHEN METABOLIC PANEL: CPT

## 2019-03-04 RX ORDER — MELOXICAM 15 MG/1
15 TABLET ORAL DAILY
Qty: 30 TABLET | Refills: 0 | Status: SHIPPED | OUTPATIENT
Start: 2019-03-04 | End: 2019-08-13

## 2019-03-04 NOTE — TELEPHONE ENCOUNTER
----- Message from Bari Cooper sent at 3/4/2019  3:36 PM CST -----  Contact: Self/[phone]  Refill: meloxicam (MOBIC) 15 MG tablet           [facility] - [place] - [facility] [phone] (Phone)  [phone] (Fax)      Thank you

## 2019-05-22 ENCOUNTER — TELEPHONE (OUTPATIENT)
Dept: FAMILY MEDICINE | Facility: CLINIC | Age: 79
End: 2019-05-22

## 2019-05-22 DIAGNOSIS — Z12.31 ENCOUNTER FOR SCREENING MAMMOGRAM FOR BREAST CANCER: Primary | ICD-10-CM

## 2019-05-22 NOTE — TELEPHONE ENCOUNTER
----- Message from Tessa Heard sent at 5/22/2019 11:07 AM CDT -----  Contact: self  [phone]  Type:  Mammogram    Caller is requesting to schedule their annual mammogram appointment.  Order is not listed in EPIC.  Please enter order and contact patient to schedule.    Name of Caller:  Pt     Where would they like the mammogram performed?  [facility]    Best Call Back Number: [phone]    Thanks....Cornelia

## 2019-06-05 DIAGNOSIS — I10 HYPERTENSION, BENIGN: ICD-10-CM

## 2019-06-05 RX ORDER — HYDROCHLOROTHIAZIDE 25 MG/1
25 TABLET ORAL DAILY
Qty: 90 TABLET | Refills: 1 | Status: SHIPPED | OUTPATIENT
Start: 2019-06-05 | End: 2019-12-02 | Stop reason: SDUPTHER

## 2019-06-05 NOTE — TELEPHONE ENCOUNTER
----- Message from Maylin Aparicio sent at 6/5/2019 10:02 AM CDT -----  Contact: Rep Mel Delatorre  .Type: Patient Call Back    Who called:Rep     What is the request in detail: Demetrius states that a referral was sent  5/30/2019 for the first jenelle f    Can the clinic reply by [facility]?no     Would the patient rather a call back or a response via [facility]? Call back     Best call back number:ph. [phone]    Additional Information:

## 2019-06-20 ENCOUNTER — HOSPITAL ENCOUNTER (OUTPATIENT)
Dept: RADIOLOGY | Facility: HOSPITAL | Age: 79
Discharge: HOME | End: 2019-06-20
Attending: FAMILY MEDICINE
Payer: MEDICARE

## 2019-06-20 DIAGNOSIS — Z12.31 ENCOUNTER FOR SCREENING MAMMOGRAM FOR BREAST CANCER: ICD-10-CM

## 2019-06-20 PROCEDURE — 77067 MAMMO DIGITAL SCREENING BILAT WITH TOMOSYNTHESIS_CAD: ICD-10-PCS | Mod: 26,,, | Performed by: RADIOLOGY

## 2019-06-20 PROCEDURE — 77063 MAMMO DIGITAL SCREENING BILAT WITH TOMOSYNTHESIS_CAD: ICD-10-PCS | Mod: 26,,, | Performed by: RADIOLOGY

## 2019-06-20 PROCEDURE — 77067 SCR MAMMO BI INCL CAD: CPT | Mod: TC

## 2019-06-20 PROCEDURE — 77063 BREAST TOMOSYNTHESIS BI: CPT | Mod: 26,,, | Performed by: RADIOLOGY

## 2019-06-20 PROCEDURE — 77067 SCR MAMMO BI INCL CAD: CPT | Mod: 26,,, | Performed by: RADIOLOGY

## 2019-07-03 DIAGNOSIS — K21.9 GASTROESOPHAGEAL REFLUX DISEASE WITHOUT ESOPHAGITIS: ICD-10-CM

## 2019-07-03 DIAGNOSIS — I12.9 BENIGN HYPERTENSION WITH CHRONIC KIDNEY DISEASE, STAGE III: ICD-10-CM

## 2019-07-03 DIAGNOSIS — N18.30 BENIGN HYPERTENSION WITH CHRONIC KIDNEY DISEASE, STAGE III: ICD-10-CM

## 2019-07-03 DIAGNOSIS — I10 HYPERTENSION, BENIGN: ICD-10-CM

## 2019-07-03 RX ORDER — METOPROLOL TARTRATE 100 MG/1
100 TABLET ORAL 2 TIMES DAILY
Qty: 180 TABLET | Refills: 0 | Status: SHIPPED | OUTPATIENT
Start: 2019-07-03 | End: 2019-10-01 | Stop reason: SDUPTHER

## 2019-07-03 RX ORDER — AMLODIPINE BESYLATE 2.5 MG/1
2.5 TABLET ORAL DAILY
Qty: 90 TABLET | Refills: 0 | Status: SHIPPED | OUTPATIENT
Start: 2019-07-03 | End: 2019-08-13

## 2019-07-03 NOTE — TELEPHONE ENCOUNTER
----- Message from Susy Ellison sent at 7/3/2019 10:52 AM CDT -----  Contact: Mel daniels/Ritu Delatorre  [phone]  Type: RX Refill Request    Who Called:  Mel daniels/[facility]    Refill or New Rx: Refill    RX Name and Strength: metoprolol tartrate (LOPRESSOR) 100 MG tablet, ranitidine (ZANTAC) 150 MG tablet, and amLODIPine (NORVASC) 2.5 MG tablet    Is this a 30 day or 90 day RX: 90 day     Preferred Pharmacy with phone number: .  [facility] - [place] [id number]  Phone: [phone] Fax: [phone]    Local or Mail Order: Local    Would the patient rather a call back or a response via [facility]? Call back    Best Call Back Number: [phone]    Additional Information: Patient is completely out of all 3 medications. [facility] will be closed for the 4th of July.

## 2019-08-13 ENCOUNTER — OFFICE VISIT (OUTPATIENT)
Dept: FAMILY MEDICINE | Facility: CLINIC | Age: 79
End: 2019-08-13
Payer: MEDICARE

## 2019-08-13 VITALS
OXYGEN SATURATION: 97 % | BODY MASS INDEX: 33.02 KG/M2 | DIASTOLIC BLOOD PRESSURE: 60 MMHG | WEIGHT: 198.19 LBS | HEIGHT: 65 IN | HEART RATE: 72 BPM | TEMPERATURE: 98 F | SYSTOLIC BLOOD PRESSURE: 120 MMHG

## 2019-08-13 DIAGNOSIS — L03.116 CELLULITIS OF LEFT LOWER EXTREMITY: Primary | ICD-10-CM

## 2019-08-13 DIAGNOSIS — E11.9 TYPE 2 DIABETES MELLITUS WITHOUT COMPLICATION, WITHOUT LONG-TERM CURRENT USE OF INSULIN: ICD-10-CM

## 2019-08-13 DIAGNOSIS — E11.9 DIABETES MELLITUS WITHOUT COMPLICATION: ICD-10-CM

## 2019-08-13 PROCEDURE — 3074F SYST BP LT 130 MM HG: CPT | Mod: CPTII,S$GLB,, | Performed by: FAMILY MEDICINE

## 2019-08-13 PROCEDURE — 1101F PT FALLS ASSESS-DOCD LE1/YR: CPT | Mod: CPTII,S$GLB,, | Performed by: FAMILY MEDICINE

## 2019-08-13 PROCEDURE — 99214 PR OFFICE/OUTPT VISIT, EST, LEVL IV, 30-39 MIN: ICD-10-PCS | Mod: S$GLB,,, | Performed by: FAMILY MEDICINE

## 2019-08-13 PROCEDURE — 99999 PR PBB SHADOW E&M-EST. PATIENT-LVL III: CPT | Mod: PBBFAC,,, | Performed by: FAMILY MEDICINE

## 2019-08-13 PROCEDURE — 3078F PR MOST RECENT DIASTOLIC BLOOD PRESSURE < 80 MM HG: ICD-10-PCS | Mod: CPTII,S$GLB,, | Performed by: FAMILY MEDICINE

## 2019-08-13 PROCEDURE — 99999 PR PBB SHADOW E&M-EST. PATIENT-LVL III: ICD-10-PCS | Mod: PBBFAC,,, | Performed by: FAMILY MEDICINE

## 2019-08-13 PROCEDURE — 99214 OFFICE O/P EST MOD 30 MIN: CPT | Mod: S$GLB,,, | Performed by: FAMILY MEDICINE

## 2019-08-13 PROCEDURE — 3078F DIAST BP <80 MM HG: CPT | Mod: CPTII,S$GLB,, | Performed by: FAMILY MEDICINE

## 2019-08-13 PROCEDURE — 3074F PR MOST RECENT SYSTOLIC BLOOD PRESSURE < 130 MM HG: ICD-10-PCS | Mod: CPTII,S$GLB,, | Performed by: FAMILY MEDICINE

## 2019-08-13 PROCEDURE — 1101F PR PT FALLS ASSESS DOC 0-1 FALLS W/OUT INJ PAST YR: ICD-10-PCS | Mod: CPTII,S$GLB,, | Performed by: FAMILY MEDICINE

## 2019-08-13 RX ORDER — CIPROFLOXACIN 500 MG/1
500 TABLET ORAL 2 TIMES DAILY
Qty: 14 TABLET | Refills: 0 | Status: SHIPPED | OUTPATIENT
Start: 2019-08-13 | End: 2019-08-20 | Stop reason: ALTCHOICE

## 2019-08-13 RX ORDER — ASPIRIN 81 MG/1
81 TABLET ORAL DAILY
COMMUNITY

## 2019-08-13 NOTE — PROGRESS NOTES
Assessment & Plan  Problem List Items Addressed This Visit        Endocrine    Type 2 diabetes mellitus without complication, without long-term current use of insulin    Overview     Cannot tolerate metformin          Relevant Orders    Hemoglobin A1c    MICROALBUMIN / CREATININE RATIO URINE      Other Visit Diagnoses     Cellulitis of left lower extremity    -  Primary    Relevant Medications    ciprofloxacin HCl (CIPRO) 500 MG tablet    Diabetes mellitus without complication            Stop amlodipine as it is contributing to her lower extremity edema     Unable to complete foot exam secondary to cellulitis     Health Maintenance reviewed.    Follow-up: Follow up in about 8 weeks (around 10/8/2019).    ______________________________________________________________________    Chief Complaint  Chief Complaint   Patient presents with    Foot Swelling       HPI  Char Savage is a 79 y.o. female with multiple medical diagnoses as listed in the medical history and problem list that presents for foot swelling.  Pt is known to me with last appointment 1/10/2019.    She reports increased swelling in her left ankle with associated discoloration.  She reports increased warmth in her left ankle and foot.  She has had increased swelling off and on for several years.  Last Thursday she developed swelling in both of her ankles.  She used a heating pad and aspercreme, but this did not help.  She used over the counter nsaids. No recent increase in her intake of salt.  She reports increased intake of sugar free snacks.  No recent intake of alcohol.  She takes her medication daily.  She denies any supplements.  She did try tumeric to assist with pain in her hip.          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    Nevus of choroid     od    Type 2 diabetes mellitus without complication 10/17/2017       PAST SURGICAL  HISTORY:  Past Surgical History:   Procedure Laterality Date    APPENDECTOMY      BREAST BIOPSY Bilateral     CATARACT EXTRACTION  1993/2000    od/os    COLONOSCOPY      COLONOSCOPY N/A 4/17/2013    Performed by Mati Posey MD at [facility] ([address] [place] [address])    HYSTERECTOMY      ORBITAL FRACTURE SURGERY  12/98    os dr coleman    PARTIAL HYSTERECTOMY      RIB FRACTURE SURGERY      TONSILLECTOMY, ADENOIDECTOMY         SOCIAL HISTORY:  Social History     Socioeconomic History    Marital status:      Spouse name: Not on file    Number of children: Not on file    Years of education: Not on file    Highest education level: Not on file   Occupational History    Not on file   Social Needs    Financial resource strain: Not on file    Food insecurity:     Worry: Not on file     Inability: Not on file    Transportation needs:     Medical: Not on file     Non-medical: Not on file   Tobacco Use    Smoking status: Never Smoker    Smokeless tobacco: Never Used   Substance and Sexual Activity    Alcohol use: No     Alcohol/week: 0.0 oz    Drug use: No    Sexual activity: Not on file   Lifestyle    Physical activity:     Days per week: Not on file     Minutes per session: Not on file    Stress: Not on file   Relationships    Social connections:     Talks on phone: Not on file     Gets together: Not on file     Attends Catholic service: Not on file     Active member of club or organization: Not on file     Attends meetings of clubs or organizations: Not on file     Relationship status: Not on file   Other Topics Concern    Not on file   Social History Narrative    Not on file       FAMILY HISTORY:  Family History   Problem Relation Age of Onset    Cancer Mother     Breast cancer Mother     Cataracts Father     Hypertension Father     No Known Problems Sister     No Known Problems Brother     No Known Problems Maternal Aunt     No Known Problems Maternal Uncle     No Known Problems Paternal Aunt      No Known Problems Paternal Uncle     No Known Problems Maternal Grandmother     No Known Problems Maternal Grandfather     No Known Problems Paternal Grandmother     No Known Problems Paternal Grandfather     Amblyopia Neg Hx     Blindness Neg Hx     Diabetes Neg Hx     Glaucoma Neg Hx     Macular degeneration Neg Hx     Retinal detachment Neg Hx     Strabismus Neg Hx     Stroke Neg Hx     Thyroid disease Neg Hx        ALLERGIES AND MEDICATIONS: updated and reviewed.  Review of patient's allergies indicates:   Allergen Reactions    Amoxicillin Other (See Comments)    Bactrim [sulfamethoxazole-trimethoprim] Other (See Comments)    Darvon [propoxyphene] Other (See Comments)    Statins-hmg-coa reductase inhibitors      Flu symptoms    Toradol [ketorolac] Other (See Comments)    Vibramycin [doxycycline calcium] Rash     Current Outpatient Medications   Medication Sig Dispense Refill    aspirin (ECOTRIN) 81 MG EC tablet Take 81 mg by mouth once daily.      hydroCHLOROthiazide (HYDRODIURIL) 25 MG tablet Take 1 tablet (25 mg total) by mouth once daily. 90 tablet 1    metoprolol tartrate (LOPRESSOR) 100 MG tablet Take 1 tablet (100 mg total) by mouth 2 (two) times daily. 180 tablet 0    multivitamin (ONE DAILY MULTIVITAMIN) per tablet Take 1 tablet by mouth once daily.      ranitidine (ZANTAC) 150 MG tablet Take 1 tablet (150 mg total) by mouth 2 (two) times daily. 180 tablet 0    ciprofloxacin HCl (CIPRO) 500 MG tablet Take 1 tablet (500 mg total) by mouth 2 (two) times daily. for 7 days 14 tablet 0     No current facility-administered medications for this visit.          ROS  Review of Systems   Constitutional: Negative for activity change, appetite change, fatigue, fever and unexpected weight change.   HENT: Negative.  Negative for ear discharge, ear pain, rhinorrhea and sore throat.    Eyes: Negative.    Respiratory: Negative for apnea, cough, chest tightness, shortness of breath and  "wheezing.    Cardiovascular: Negative for chest pain, palpitations and leg swelling.   Gastrointestinal: Negative for abdominal distention, abdominal pain, constipation, diarrhea and vomiting.   Endocrine: Negative for cold intolerance, heat intolerance, polydipsia and polyuria.   Genitourinary: Negative for decreased urine volume and urgency.   Musculoskeletal: Positive for joint swelling.   Skin: Negative for rash.   Neurological: Negative for dizziness and headaches.   Hematological: Does not bruise/bleed easily.   Psychiatric/Behavioral: Negative for agitation, sleep disturbance and suicidal ideas.           Physical Exam  Vitals:    08/13/19 1341   BP: 120/60   BP Location: Right arm   Patient Position: Sitting   BP Method: Large (Manual)   Pulse: 72   Temp: 98.4 °F (36.9 °C)   TempSrc: Oral   SpO2: 97%   Weight: 89.9 kg (198 lb 3.1 oz)   Height: 5' 5" (1.651 m)    Body mass index is 32.98 kg/m².  Weight: 89.9 kg (198 lb 3.1 oz)   Height: 5' 5" (165.1 cm)   Physical Exam   Constitutional: She is oriented to person, place, and time. She appears well-developed and well-nourished.   HENT:   Head: Normocephalic and atraumatic.   Right Ear: External ear normal.   Left Ear: External ear normal.   Nose: Nose normal.   Mouth/Throat: Oropharynx is clear and moist.   Eyes: Pupils are equal, round, and reactive to light. Conjunctivae and EOM are normal.   Cardiovascular: Normal rate, regular rhythm and normal heart sounds.   Pulmonary/Chest: Effort normal and breath sounds normal.   Neurological: She is alert and oriented to person, place, and time.   Skin: Skin is warm and dry. Rash noted.        Vitals reviewed.        Health Maintenance       Date Due Completion Date    TETANUS VACCINE 08/03/1956 ---    Shingles Vaccine (1 of 2) 08/03/1988 ---    Urine Microalbumin 11/02/2010 11/2/2009    Hemoglobin A1c 05/15/2019 11/15/2018    Foot Exam 06/06/2019 6/6/2018    Influenza Vaccine (1) 08/01/2019 10/17/2018    Eye Exam " 10/16/2019 10/16/2018    Override on 10/10/2017: Done    Lipid Panel 11/15/2019 11/15/2018    DEXA SCAN 10/30/2020 10/30/2017              Patient note was created using [employer].  Any errors in syntax or even information may not have been identified and edited on initial review prior to signing this note.

## 2019-08-19 ENCOUNTER — TELEPHONE (OUTPATIENT)
Dept: FAMILY MEDICINE | Facility: CLINIC | Age: 79
End: 2019-08-19

## 2019-08-19 NOTE — TELEPHONE ENCOUNTER
Patient said that still having the redness, swelling and pain in legs. Said that it looks her left legs is getting worst, traveling up her leg more. She is on her last day of the Cipro. Should she come back in to be recheck. Please Advise

## 2019-08-19 NOTE — TELEPHONE ENCOUNTER
----- Message from Heather Wetzel sent at 8/19/2019  9:57 AM CDT -----  Contact: Self/  [phone]  Type: Patient Call Back    Who called:  Patient    What is the request in detail:  Patient would like staff to give her a call regarding her medication.  Thank you    Would the patient rather a call back or a response via [facility]? Call back    Best call back number:   [phone]

## 2019-08-20 ENCOUNTER — OFFICE VISIT (OUTPATIENT)
Dept: FAMILY MEDICINE | Facility: CLINIC | Age: 79
End: 2019-08-20
Payer: MEDICARE

## 2019-08-20 VITALS
TEMPERATURE: 99 F | OXYGEN SATURATION: 99 % | HEIGHT: 65 IN | DIASTOLIC BLOOD PRESSURE: 86 MMHG | BODY MASS INDEX: 32.27 KG/M2 | SYSTOLIC BLOOD PRESSURE: 176 MMHG | HEART RATE: 75 BPM | WEIGHT: 193.69 LBS

## 2019-08-20 DIAGNOSIS — I12.9 BENIGN HYPERTENSION WITH CHRONIC KIDNEY DISEASE, STAGE III: ICD-10-CM

## 2019-08-20 DIAGNOSIS — L03.116 CELLULITIS OF LEFT LOWER EXTREMITY: Primary | ICD-10-CM

## 2019-08-20 DIAGNOSIS — N18.30 BENIGN HYPERTENSION WITH CHRONIC KIDNEY DISEASE, STAGE III: ICD-10-CM

## 2019-08-20 DIAGNOSIS — M25.472 EDEMA OF BOTH ANKLES: ICD-10-CM

## 2019-08-20 DIAGNOSIS — M25.471 EDEMA OF BOTH ANKLES: ICD-10-CM

## 2019-08-20 DIAGNOSIS — R60.0 EDEMA OF BOTH FEET: ICD-10-CM

## 2019-08-20 PROCEDURE — 1101F PT FALLS ASSESS-DOCD LE1/YR: CPT | Mod: CPTII,S$GLB,, | Performed by: NURSE PRACTITIONER

## 2019-08-20 PROCEDURE — 3079F PR MOST RECENT DIASTOLIC BLOOD PRESSURE 80-89 MM HG: ICD-10-PCS | Mod: CPTII,S$GLB,, | Performed by: NURSE PRACTITIONER

## 2019-08-20 PROCEDURE — 3079F DIAST BP 80-89 MM HG: CPT | Mod: CPTII,S$GLB,, | Performed by: NURSE PRACTITIONER

## 2019-08-20 PROCEDURE — 99214 OFFICE O/P EST MOD 30 MIN: CPT | Mod: S$GLB,,, | Performed by: NURSE PRACTITIONER

## 2019-08-20 PROCEDURE — 99999 PR PBB SHADOW E&M-EST. PATIENT-LVL IV: ICD-10-PCS | Mod: PBBFAC,,, | Performed by: NURSE PRACTITIONER

## 2019-08-20 PROCEDURE — 3077F SYST BP >= 140 MM HG: CPT | Mod: CPTII,S$GLB,, | Performed by: NURSE PRACTITIONER

## 2019-08-20 PROCEDURE — 99999 PR PBB SHADOW E&M-EST. PATIENT-LVL IV: CPT | Mod: PBBFAC,,, | Performed by: NURSE PRACTITIONER

## 2019-08-20 PROCEDURE — 99214 PR OFFICE/OUTPT VISIT, EST, LEVL IV, 30-39 MIN: ICD-10-PCS | Mod: S$GLB,,, | Performed by: NURSE PRACTITIONER

## 2019-08-20 PROCEDURE — 1101F PR PT FALLS ASSESS DOC 0-1 FALLS W/OUT INJ PAST YR: ICD-10-PCS | Mod: CPTII,S$GLB,, | Performed by: NURSE PRACTITIONER

## 2019-08-20 PROCEDURE — 99499 RISK ADDL DX/OHS AUDIT: ICD-10-PCS | Mod: S$GLB,,, | Performed by: NURSE PRACTITIONER

## 2019-08-20 PROCEDURE — 3077F PR MOST RECENT SYSTOLIC BLOOD PRESSURE >= 140 MM HG: ICD-10-PCS | Mod: CPTII,S$GLB,, | Performed by: NURSE PRACTITIONER

## 2019-08-20 PROCEDURE — 99499 UNLISTED E&M SERVICE: CPT | Mod: S$GLB,,, | Performed by: NURSE PRACTITIONER

## 2019-08-20 RX ORDER — AMLODIPINE BESYLATE 2.5 MG/1
2.5 TABLET ORAL DAILY
Qty: 90 TABLET | Refills: 0
Start: 2019-08-20 | End: 2019-09-04

## 2019-08-20 RX ORDER — CLINDAMYCIN HYDROCHLORIDE 300 MG/1
300 CAPSULE ORAL EVERY 8 HOURS
Qty: 30 CAPSULE | Refills: 0 | Status: SHIPPED | OUTPATIENT
Start: 2019-08-20 | End: 2019-09-04

## 2019-08-20 NOTE — PROGRESS NOTES
Subjective:       Patient ID: Char Savage is a 79 y.o. female.    Chief Complaint: Recurrent Skin Infections (pt states follow up from ,finished antibiotics, but left leg not getting better.)    Edema   This is a recurrent problem. The current episode started 1 to 4 weeks ago. The problem has been gradually worsening. Pertinent negatives include no chest pain or weakness. Nothing aggravates the symptoms. Treatments tried: oral antibiotics and elevation. The treatment provided no relief.       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    Nevus of choroid     od    Type 2 diabetes mellitus without complication 10/17/2017       Social History     Socioeconomic History    Marital status:      Spouse name: Not on file    Number of children: Not on file    Years of education: Not on file    Highest education level: Not on file   Occupational History    Not on file   Social Needs    Financial resource strain: Not on file    Food insecurity:     Worry: Not on file     Inability: Not on file    Transportation needs:     Medical: Not on file     Non-medical: Not on file   Tobacco Use    Smoking status: Never Smoker    Smokeless tobacco: Never Used   Substance and Sexual Activity    Alcohol use: No     Alcohol/week: 0.0 oz    Drug use: No    Sexual activity: Not on file   Lifestyle    Physical activity:     Days per week: Not on file     Minutes per session: Not on file    Stress: Not on file   Relationships    Social connections:     Talks on phone: Not on file     Gets together: Not on file     Attends Oriental orthodox service: Not on file     Active member of club or organization: Not on file     Attends meetings of clubs or organizations: Not on file     Relationship status: Not on file   Other Topics Concern    Not on file   Social History Narrative    Not on file       Past Surgical History:  "  Procedure Laterality Date    APPENDECTOMY      BREAST BIOPSY Bilateral     CATARACT EXTRACTION  1993/2000    od/os    COLONOSCOPY      COLONOSCOPY N/A 4/17/2013    Performed by Mat[facility] Posey MD at [facility]    HYSTERECTOMY      ORBITAL FRACTURE SURGERY  12/98    os dr coleman    PARTIAL HYSTERECTOMY      RIB FRACTURE SURGERY      TONSILLECTOMY, ADENOIDECTOMY         Review of Systems   Constitutional: Negative for activity change.   Respiratory: Negative for chest tightness and shortness of breath.    Cardiovascular: Positive for leg swelling. Negative for chest pain and palpitations.   Skin: Positive for color change.   Neurological: Negative for weakness.   All other systems reviewed and are negative.      Objective:   BP (!) 176/86 (BP Location: Right arm, Patient Position: Sitting, BP Method: Large (Manual))   Pulse 75   Temp 98.7 °F (37.1 °C) (Oral)   Ht 5' 5" (1.651 m)   Wt 87.9 kg (193 lb 10.8 oz)   SpO2 99%   BMI 32.23 kg/m²      Physical Exam   Constitutional: She is oriented to person, place, and time. She appears well-developed and well-nourished.   HENT:   Head: Normocephalic and atraumatic.   Neck: Normal carotid pulses and no JVD present. Carotid bruit is not present.   Cardiovascular: Normal rate, regular rhythm, normal heart sounds and normal pulses.   2+ pitting edema noted to ankles and feet   Pulmonary/Chest: Effort normal and breath sounds normal. No respiratory distress. She has no decreased breath sounds.   Neurological: She is alert and oriented to person, place, and time. She has normal strength.   Skin: There is erythema.   Psychiatric: She has a normal mood and affect. Her speech is normal and behavior is normal.               Assessment:       1. Cellulitis of left lower extremity    2. Edema of both ankles    3. Edema of both feet    4. Benign hypertension with chronic kidney disease, stage III        Plan:       Char was seen today for recurrent skin " infections.    Diagnoses and all orders for this visit:    Cellulitis of left lower extremity  -     clindamycin (CLEOCIN) 300 MG capsule; Take 1 capsule (300 mg total) by mouth every 8 (eight) hours.  -     Advised of effects of medication including stomach upset and diarrhea. She verbalized understanding.    Edema of both ankles  -     Encouraged to elevate legs    Edema of both feet  -     Encouraged to elevate legs    Benign hypertension with chronic kidney disease, stage III  -     amLODIPine (NORVASC) 2.5 MG tablet; Take 1 tablet (2.5 mg total) by mouth once daily.  -     This problem is currently not controlled. Please follow up with your PCP as planned to discuss adjustments to your treatment plan.  -      The patient is asked to make an attempt to improve diet and exercise patterns to aid in medical management of this problem.  -       She is to restart amlodipine. She will follow up in 2 weeks. She has labs scheduled and will have them prior to her scheduled appt.    Follow up in about 2 weeks (around 9/3/2019), or if symptoms worsen or fail to improve.

## 2019-08-29 ENCOUNTER — LAB VISIT (OUTPATIENT)
Dept: LAB | Facility: HOSPITAL | Age: 79
End: 2019-08-29
Attending: FAMILY MEDICINE
Payer: MEDICARE

## 2019-08-29 DIAGNOSIS — E11.9 TYPE 2 DIABETES MELLITUS WITHOUT COMPLICATION, WITHOUT LONG-TERM CURRENT USE OF INSULIN: ICD-10-CM

## 2019-08-29 LAB
ESTIMATED AVG GLUCOSE: 148 MG/DL (ref 68–131)
HBA1C MFR BLD HPLC: 6.8 % (ref 4–5.6)

## 2019-08-29 PROCEDURE — 83036 HEMOGLOBIN GLYCOSYLATED A1C: CPT

## 2019-08-29 PROCEDURE — 36415 COLL VENOUS BLD VENIPUNCTURE: CPT | Mod: PO

## 2019-09-04 ENCOUNTER — OFFICE VISIT (OUTPATIENT)
Dept: FAMILY MEDICINE | Facility: CLINIC | Age: 79
End: 2019-09-04
Payer: MEDICARE

## 2019-09-04 VITALS
WEIGHT: 202.38 LBS | DIASTOLIC BLOOD PRESSURE: 80 MMHG | HEIGHT: 65 IN | SYSTOLIC BLOOD PRESSURE: 120 MMHG | TEMPERATURE: 98 F | OXYGEN SATURATION: 97 % | BODY MASS INDEX: 33.72 KG/M2 | HEART RATE: 65 BPM

## 2019-09-04 DIAGNOSIS — M79.89 SWELLING OF LOWER EXTREMITY: Primary | ICD-10-CM

## 2019-09-04 DIAGNOSIS — E78.5 HYPERLIPIDEMIA, UNSPECIFIED HYPERLIPIDEMIA TYPE: ICD-10-CM

## 2019-09-04 DIAGNOSIS — I10 HYPERTENSION, BENIGN: ICD-10-CM

## 2019-09-04 DIAGNOSIS — E11.9 TYPE 2 DIABETES MELLITUS WITHOUT COMPLICATION, WITHOUT LONG-TERM CURRENT USE OF INSULIN: ICD-10-CM

## 2019-09-04 PROCEDURE — 3074F PR MOST RECENT SYSTOLIC BLOOD PRESSURE < 130 MM HG: ICD-10-PCS | Mod: CPTII,S$GLB,, | Performed by: FAMILY MEDICINE

## 2019-09-04 PROCEDURE — 99214 OFFICE O/P EST MOD 30 MIN: CPT | Mod: S$GLB,,, | Performed by: FAMILY MEDICINE

## 2019-09-04 PROCEDURE — 99499 RISK ADDL DX/OHS AUDIT: ICD-10-PCS | Mod: S$GLB,,, | Performed by: FAMILY MEDICINE

## 2019-09-04 PROCEDURE — 1101F PR PT FALLS ASSESS DOC 0-1 FALLS W/OUT INJ PAST YR: ICD-10-PCS | Mod: CPTII,S$GLB,, | Performed by: FAMILY MEDICINE

## 2019-09-04 PROCEDURE — 99214 PR OFFICE/OUTPT VISIT, EST, LEVL IV, 30-39 MIN: ICD-10-PCS | Mod: S$GLB,,, | Performed by: FAMILY MEDICINE

## 2019-09-04 PROCEDURE — 99499 UNLISTED E&M SERVICE: CPT | Mod: S$GLB,,, | Performed by: FAMILY MEDICINE

## 2019-09-04 PROCEDURE — 99999 PR PBB SHADOW E&M-EST. PATIENT-LVL III: CPT | Mod: PBBFAC,,, | Performed by: FAMILY MEDICINE

## 2019-09-04 PROCEDURE — 3074F SYST BP LT 130 MM HG: CPT | Mod: CPTII,S$GLB,, | Performed by: FAMILY MEDICINE

## 2019-09-04 PROCEDURE — 3079F PR MOST RECENT DIASTOLIC BLOOD PRESSURE 80-89 MM HG: ICD-10-PCS | Mod: CPTII,S$GLB,, | Performed by: FAMILY MEDICINE

## 2019-09-04 PROCEDURE — 1101F PT FALLS ASSESS-DOCD LE1/YR: CPT | Mod: CPTII,S$GLB,, | Performed by: FAMILY MEDICINE

## 2019-09-04 PROCEDURE — 3079F DIAST BP 80-89 MM HG: CPT | Mod: CPTII,S$GLB,, | Performed by: FAMILY MEDICINE

## 2019-09-04 PROCEDURE — 99999 PR PBB SHADOW E&M-EST. PATIENT-LVL III: ICD-10-PCS | Mod: PBBFAC,,, | Performed by: FAMILY MEDICINE

## 2019-09-04 RX ORDER — VALSARTAN 40 MG/1
40 TABLET ORAL DAILY
Qty: 90 TABLET | Refills: 3 | Status: SHIPPED | OUTPATIENT
Start: 2019-09-04 | End: 2020-03-04

## 2019-09-04 NOTE — PROGRESS NOTES
Assessment & Plan  Problem List Items Addressed This Visit        Cardiac/Vascular    Hyperlipidemia    Overview     Cannot tolerate pravastatin nor crestor.  Will focus on diet changes to improve her control.          Relevant Orders    CBC auto differential    Comprehensive metabolic panel    Lipid panel    Hemoglobin A1c    TSH    Microalbumin/creatinine urine ratio    Hypertension, benign    Overview     Failed amlodipine         Current Assessment & Plan     Stop amlodipine secondary to lower extremity swelling         Relevant Medications    valsartan (DIOVAN) 40 MG tablet    Other Relevant Orders    CBC auto differential    Comprehensive metabolic panel    Lipid panel    Hemoglobin A1c    TSH    Microalbumin/creatinine urine ratio       Endocrine    Type 2 diabetes mellitus without complication, without long-term current use of insulin    Overview     Cannot tolerate metformin          Relevant Orders    CBC auto differential    Comprehensive metabolic panel    Lipid panel    Hemoglobin A1c    TSH    Microalbumin/creatinine urine ratio      Other Visit Diagnoses     Swelling of lower extremity    -  Primary    Relevant Orders    COMPRESSION STOCKINGS            Health Maintenance reviewed.    Follow-up: No follow-ups on file.    ______________________________________________________________________    Chief Complaint  Chief Complaint   Patient presents with    swelling in legs    Follow-up       HPI  Char Savage is a 79 y.o. female with multiple medical diagnoses as listed in the medical history and problem list that presents for swelling in legs.  Pt is known to me with last appointment 8/13/2019.    Hypertension: The patient reports that they check their blood pressures regularly and blood pressure is generally well controlled (<= 139/89).  The patient  is not enrolled in the digital hypertension program. The patient denies  cardiac chest pain, shortness of breath, lower extremity edema, headaches  and side effects of medication. The patient reports problems with  none and lower extremity edema. The patient  has been compliant with the current medication regimen.  The patient : tries to follow a low salt diet.  Hyperlipidemia: Patient reports that they have been compliant with low fat diet and regular exercise. Patient reports that they have been compliant with their medication regimen and deny any problems/side effects from the medication.      She has noted increased lower extremity swelling.  This is a new finding for her.  She was recently treated for a cellulitis successfully, but she has residual lower extremity swelling.  We discussed her medication and ways to improve her swelling. She denies any dyspnea and orthopnea.     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    Nevus of choroid     od    Type 2 diabetes mellitus without complication 10/17/2017       PAST SURGICAL HISTORY:  Past Surgical History:   Procedure Laterality Date    APPENDECTOMY      BREAST BIOPSY Bilateral     CATARACT EXTRACTION  1993/2000    od/os    COLONOSCOPY      COLONOSCOPY N/A 4/17/2013    Performed [facility] at [facility] ([facility]    HYSTERECTOMY      ORBITAL FRACTURE SURGERY  12/98    os dr coleman    PARTIAL HYSTERECTOMY      RIB FRACTURE SURGERY      TONSILLECTOMY, ADENOIDECTOMY         SOCIAL HISTORY:  Social History     Socioeconomic History    Marital status:      Spouse name: Not on file    Number of children: Not on file    Years of education: Not on file    Highest education level: Not on file   Occupational History    Not on file   Social Needs    Financial resource strain: Not on file    Food insecurity:     Worry: Not on file     Inability: Not on file    Transportation needs:     Medical: Not on file     Non-medical: Not on file   Tobacco Use    Smoking status: Never  Smoker    Smokeless tobacco: Never Used   Substance and Sexual Activity    Alcohol use: No     Alcohol/week: 0.0 oz    Drug use: No    Sexual activity: Not on file   Lifestyle    Physical activity:     Days per week: Not on file     Minutes per session: Not on file    Stress: Not on file   Relationships    Social connections:     Talks on phone: Not on file     Gets together: Not on file     Attends Latter day service: Not on file     Active member of club or organization: Not on file     Attends meetings of clubs or organizations: Not on file     Relationship status: Not on file   Other Topics Concern    Not on file   Social History Narrative    Not on file       FAMILY HISTORY:  Family History   Problem Relation Age of Onset    Cancer Mother     Breast cancer Mother     Cataracts Father     Hypertension Father     No Known Problems Sister     No Known Problems Brother     No Known Problems Maternal Aunt     No Known Problems Maternal Uncle     No Known Problems Paternal Aunt     No Known Problems Paternal Uncle     No Known Problems Maternal Grandmother     No Known Problems Maternal Grandfather     No Known Problems Paternal Grandmother     No Known Problems Paternal Grandfather     Amblyopia Neg Hx     Blindness Neg Hx     Diabetes Neg Hx     Glaucoma Neg Hx     Macular degeneration Neg Hx     Retinal detachment Neg Hx     Strabismus Neg Hx     Stroke Neg Hx     Thyroid disease Neg Hx        ALLERGIES AND MEDICATIONS: updated and reviewed.  Review of patient's allergies indicates:   Allergen Reactions    Amoxicillin Other (See Comments)    Bactrim [sulfamethoxazole-trimethoprim] Other (See Comments)    Darvon [propoxyphene] Other (See Comments)    Statins-hmg-coa reductase inhibitors      Flu symptoms    Toradol [ketorolac] Other (See Comments)    Vibramycin [doxycycline calcium] Rash     Current Outpatient Medications   Medication Sig Dispense Refill    aspirin (ECOTRIN) 81  "MG EC tablet Take 81 mg by mouth once daily.      hydroCHLOROthiazide (HYDRODIURIL) 25 MG tablet Take 1 tablet (25 mg total) by mouth once daily. 90 tablet 1    metoprolol tartrate (LOPRESSOR) 100 MG tablet Take 1 tablet (100 mg total) by mouth 2 (two) times daily. 180 tablet 0    multivitamin (ONE DAILY MULTIVITAMIN) per tablet Take 1 tablet by mouth once daily.      ranitidine (ZANTAC) 150 MG tablet Take 1 tablet (150 mg total) by mouth 2 (two) times daily. 180 tablet 0    valsartan (DIOVAN) 40 MG tablet Take 1 tablet (40 mg total) by mouth once daily. 90 tablet 3     No current facility-administered medications for this visit.          ROS  Review of Systems   Constitutional: Negative for activity change, appetite change, fatigue, fever and unexpected weight change.   HENT: Negative.  Negative for ear discharge, ear pain, rhinorrhea and sore throat.    Eyes: Negative.    Respiratory: Negative for apnea, cough, chest tightness, shortness of breath and wheezing.    Cardiovascular: Negative for chest pain, palpitations and leg swelling.   Gastrointestinal: Negative for abdominal distention, abdominal pain, constipation, diarrhea and vomiting.   Endocrine: Negative for cold intolerance, heat intolerance, polydipsia and polyuria.   Genitourinary: Negative for decreased urine volume and urgency.   Musculoskeletal: Negative.    Skin: Negative for rash.   Neurological: Negative for dizziness and headaches.   Hematological: Does not bruise/bleed easily.   Psychiatric/Behavioral: Negative for agitation, sleep disturbance and suicidal ideas.           Physical Exam  Vitals:    09/04/19 1018   BP: 120/80   BP Location: Left arm   Patient Position: Sitting   BP Method: Large (Manual)   Pulse: 65   Temp: 98.4 °F (36.9 °C)   TempSrc: Oral   SpO2: 97%   Weight: 91.8 kg (202 lb 6.1 oz)   Height: 5' 5" (1.651 m)    Body mass index is 33.68 kg/m².  Weight: 91.8 kg (202 lb 6.1 oz)   Height: 5' 5" (165.1 cm)   Physical Exam "   Constitutional: She is oriented to person, place, and time. She appears well-developed and well-nourished.   HENT:   Head: Normocephalic and atraumatic.   Right Ear: External ear normal.   Left Ear: External ear normal.   Nose: Nose normal.   Mouth/Throat: Oropharynx is clear and moist.   Eyes: Pupils are equal, round, and reactive to light. Conjunctivae and EOM are normal.   Cardiovascular: Normal rate, regular rhythm and normal heart sounds.   Pulmonary/Chest: Effort normal and breath sounds normal.   Neurological: She is alert and oriented to person, place, and time.   Skin: Skin is warm and dry.   Vitals reviewed.      Health Maintenance       Date Due Completion Date    Urine Microalbumin 11/02/2010 11/2/2009    Foot Exam 06/06/2019 6/6/2018    Influenza Vaccine (1) 09/01/2019 10/17/2018    TETANUS VACCINE 09/04/2020 (Originally 8/3/1958) ---    Shingles Vaccine (1 of 2) 09/04/2020 (Originally 8/3/1988) ---    Eye Exam 10/16/2019 10/16/2018    Override on 10/10/2017: Done    Lipid Panel 11/15/2019 11/15/2018    Hemoglobin A1c 02/29/2020 8/29/2019    DEXA SCAN 10/30/2020 10/30/2017              Patient note was created using "[employer].  Any errors in syntax or even information may not have been identified and edited on initial review prior to signing this note.

## 2019-10-01 DIAGNOSIS — K21.9 GASTROESOPHAGEAL REFLUX DISEASE WITHOUT ESOPHAGITIS: ICD-10-CM

## 2019-10-01 DIAGNOSIS — I10 HYPERTENSION, BENIGN: ICD-10-CM

## 2019-10-01 RX ORDER — METOPROLOL TARTRATE 100 MG/1
TABLET ORAL
Qty: 180 TABLET | Refills: 0 | Status: SHIPPED | OUTPATIENT
Start: 2019-10-01 | End: 2020-01-02

## 2019-10-04 ENCOUNTER — OFFICE VISIT (OUTPATIENT)
Dept: FAMILY MEDICINE | Facility: CLINIC | Age: 79
End: 2019-10-04
Payer: MEDICARE

## 2019-10-04 ENCOUNTER — HOSPITAL ENCOUNTER (OUTPATIENT)
Dept: RADIOLOGY | Facility: HOSPITAL | Age: 79
Discharge: HOME | End: 2019-10-04
Attending: NURSE PRACTITIONER
Payer: MEDICARE

## 2019-10-04 VITALS
HEIGHT: 65 IN | DIASTOLIC BLOOD PRESSURE: 68 MMHG | TEMPERATURE: 97 F | OXYGEN SATURATION: 96 % | SYSTOLIC BLOOD PRESSURE: 160 MMHG | BODY MASS INDEX: 33.53 KG/M2 | HEART RATE: 60 BPM | WEIGHT: 201.25 LBS

## 2019-10-04 DIAGNOSIS — M79.604 PAIN AND SWELLING OF RIGHT LOWER EXTREMITY: ICD-10-CM

## 2019-10-04 DIAGNOSIS — M79.89 PAIN AND SWELLING OF RIGHT LOWER EXTREMITY: ICD-10-CM

## 2019-10-04 DIAGNOSIS — M79.605 PAIN AND SWELLING OF LEFT LOWER EXTREMITY: Primary | ICD-10-CM

## 2019-10-04 DIAGNOSIS — I87.2 VENOUS STASIS DERMATITIS OF BOTH LOWER EXTREMITIES: ICD-10-CM

## 2019-10-04 DIAGNOSIS — M79.605 PAIN AND SWELLING OF LEFT LOWER EXTREMITY: ICD-10-CM

## 2019-10-04 DIAGNOSIS — M79.89 PAIN AND SWELLING OF LEFT LOWER EXTREMITY: ICD-10-CM

## 2019-10-04 DIAGNOSIS — E11.9 TYPE 2 DIABETES MELLITUS WITHOUT COMPLICATION, WITHOUT LONG-TERM CURRENT USE OF INSULIN: ICD-10-CM

## 2019-10-04 DIAGNOSIS — M79.89 PAIN AND SWELLING OF LEFT LOWER EXTREMITY: Primary | ICD-10-CM

## 2019-10-04 DIAGNOSIS — I10 HYPERTENSION, BENIGN: ICD-10-CM

## 2019-10-04 PROCEDURE — 3078F DIAST BP <80 MM HG: CPT | Mod: CPTII,S$GLB,, | Performed by: NURSE PRACTITIONER

## 2019-10-04 PROCEDURE — 99999 PR PBB SHADOW E&M-EST. PATIENT-LVL V: ICD-10-PCS | Mod: PBBFAC,,, | Performed by: NURSE PRACTITIONER

## 2019-10-04 PROCEDURE — 1101F PR PT FALLS ASSESS DOC 0-1 FALLS W/OUT INJ PAST YR: ICD-10-PCS | Mod: CPTII,S$GLB,, | Performed by: NURSE PRACTITIONER

## 2019-10-04 PROCEDURE — 99214 PR OFFICE/OUTPT VISIT, EST, LEVL IV, 30-39 MIN: ICD-10-PCS | Mod: S$GLB,,, | Performed by: NURSE PRACTITIONER

## 2019-10-04 PROCEDURE — 3077F PR MOST RECENT SYSTOLIC BLOOD PRESSURE >= 140 MM HG: ICD-10-PCS | Mod: CPTII,S$GLB,, | Performed by: NURSE PRACTITIONER

## 2019-10-04 PROCEDURE — 93970 EXTREMITY STUDY: CPT | Mod: 26,,, | Performed by: RADIOLOGY

## 2019-10-04 PROCEDURE — 3078F PR MOST RECENT DIASTOLIC BLOOD PRESSURE < 80 MM HG: ICD-10-PCS | Mod: CPTII,S$GLB,, | Performed by: NURSE PRACTITIONER

## 2019-10-04 PROCEDURE — 1101F PT FALLS ASSESS-DOCD LE1/YR: CPT | Mod: CPTII,S$GLB,, | Performed by: NURSE PRACTITIONER

## 2019-10-04 PROCEDURE — 93970 EXTREMITY STUDY: CPT | Mod: TC

## 2019-10-04 PROCEDURE — 3077F SYST BP >= 140 MM HG: CPT | Mod: CPTII,S$GLB,, | Performed by: NURSE PRACTITIONER

## 2019-10-04 PROCEDURE — 93970 US LOWER EXTREMITY VEINS BILATERAL: ICD-10-PCS | Mod: 26,,, | Performed by: RADIOLOGY

## 2019-10-04 PROCEDURE — 99214 OFFICE O/P EST MOD 30 MIN: CPT | Mod: S$GLB,,, | Performed by: NURSE PRACTITIONER

## 2019-10-04 PROCEDURE — 99999 PR PBB SHADOW E&M-EST. PATIENT-LVL V: CPT | Mod: PBBFAC,,, | Performed by: NURSE PRACTITIONER

## 2019-10-04 RX ORDER — TRIAMCINOLONE ACETONIDE 1 MG/G
CREAM TOPICAL 2 TIMES DAILY
Qty: 45 G | Refills: 1 | Status: SHIPPED | OUTPATIENT
Start: 2019-10-04 | End: 2019-10-30

## 2019-10-04 NOTE — PATIENT INSTRUCTIONS
Nonspecific Dermatitis  Dermatitis is a skin rash caused by something that touches the skin and makes it irritated and inflamed.  Your skin may be red, swollen, dry, and may be cracked. Blisters may form and ooze. The rash will itch.  Dermatitis can form on the face and neck, backs of hands, forearms, genitals, and lower legs. Dermatitis is not passed from person to person.  Talk with your health care provider about what may have caused the rash. Common things that cause skin allergies are metal in jewelry, plants like poison ivy or poison oak, and certain skin care products. You will need to avoid the source of your rash in the future to prevent it from coming back. In some cases, the cause of the dermatitis may not be found.  Treatment is done to relieve itching and prevent the rash from coming back. The rash should go away in a few days to a few weeks.  Home care  The health care provider may prescribe medications to relieve swelling and itching. Follow all instructions when using these medications.  · Avoid anything that heats up your skin, such as hot showers or baths, or direct sunlight. This can make itching worse.  · Stay away from whatever you think caused the rash.  · Bathe in warm, not hot, water. Apply a moisturizing lotion after bathing to prevent dry skin.  · Avoid skin irritants such as wool or silk clothing, grease, oils, harsh soaps, and detergents.  · Apply cold compresses to soothe your sores to help relieve your symptoms. Do this for 30 minutes 3 to 4 times a day. You can make a cold compress by soaking a cloth in cold water. Squeeze out excess water. You can add colloidal oatmeal to the water to help reduce itching. For severe itching in a small area, apply an ice pack wrapped in a thin towel. Do this for 20 minutes 3 to 4 times a day.  · You can also help relieve large areas of itching by taking a lukewarm bath with colloidal oatmeal added to the water.  · Use hydrocortisone cream for redness  and irritation, unless another medicine was prescribed. You can also use benzocaine anesthetic cream or spray.  · Use oral diphenhydramine to help reduce itching. This is an antihistamine you can buy at drug and grocery stores. It can make you sleepy, so use lower doses during the daytime. Or you can use loratadine. This is an antihistamine that will not make you sleepy. Dont use diphenhydramine if you have glaucoma or have trouble urinating because of an enlarged prostate.  · Wash your hands or use an antibacterial gel often to prevent the spread of the rash.  Follow-up care  Follow up with your health care provider. Make an appointment with your health care provider if your symptoms do not get better in the next 1 to 2 weeks.  When to seek medical advice  Call your health care provider right away if any of these occur:  · Spreading of the rash to other parts of your body  · Severe swelling of your face, eyelids, mouth, throat or tongue  · Trouble urinating due to swelling in the genital area  · Fever of 100.4°F (38°C) or higher  · Redness or swelling that gets worse  · Pain that gets worse  · Foul-smelling fluid leaking from the skin  · Yellow-brown crusts on the open blisters  · Joint pain   Date Last Reviewed: 7/23/2014  © 9096-0302 [employer]. [address] [place] [address], [place] [address]. All rights reserved. This information is not intended as a substitute for professional medical care. Always follow your healthcare professional's instructions.

## 2019-10-04 NOTE — PROGRESS NOTES
Subjective:       Patient ID: Char Savage is a 79 y.o. female.    Chief Complaint: Leg Swelling (red snin peeling)    Char Savage is a 79 y.o. female with multiple medical diagnoses as listed in the medical history and problem list that presents for swelling in legs.  Pt is new  to me but known to the clinic  with last appointment with Dr Perez  09/04/2019. She has noted increased lower extremity swelling.  This is a recurrent problem.   She was recently treated for a cellulitis successfully, but she has residual lower extremity swelling and a rash. We discussed her medication and ways to improve her swelling. She denies any dyspnea and orthopnea.       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    Nevus of choroid     od    Type 2 diabetes mellitus without complication 10/17/2017     Past Surgical History:   Procedure Laterality Date    APPENDECTOMY      BREAST BIOPSY Bilateral     CATARACT EXTRACTION  1993/2000    od/os    COLONOSCOPY      HYSTERECTOMY      ORBITAL FRACTURE SURGERY  12/98    os dr coleman    PARTIAL HYSTERECTOMY      RIB FRACTURE SURGERY      TONSILLECTOMY, ADENOIDECTOMY       Social History     Socioeconomic History    Marital status:      Spouse name: Not on file    Number of children: Not on file    Years of education: Not on file    Highest education level: Not on file   Occupational History    Not on file   Social Needs    Financial resource strain: Not on file    Food insecurity:     Worry: Not on file     Inability: Not on file    Transportation needs:     Medical: Not on file     Non-medical: Not on file   Tobacco Use    Smoking status: Never Smoker    Smokeless tobacco: Never Used   Substance and Sexual Activity    Alcohol use: No     Alcohol/week: 0.0 standard drinks    Drug use: No    Sexual activity: Not on file   Lifestyle    Physical activity:     Days per  week: Not on file     Minutes per session: Not on file    Stress: Not on file   Relationships    Social connections:     Talks on phone: Not on file     Gets together: Not on file     Attends [employer] service: Not on file     Active member of club or organization: Not on file     Attends meetings of clubs or organizations: Not on file     Relationship status: Not on file   Other Topics Concern    Not on file   Social History Narrative    Not on file     Family History   Problem Relation Age of Onset    Cancer Mother     Breast cancer Mother     Cataracts Father     Hypertension Father     No Known Problems Sister     No Known Problems Brother     No Known Problems Maternal Aunt     No Known Problems Maternal Uncle     No Known Problems Paternal Aunt     No Known Problems Paternal Uncle     No Known Problems Maternal Grandmother     No Known Problems Maternal Grandfather     No Known Problems Paternal Grandmother     No Known Problems Paternal Grandfather     Amblyopia Neg Hx     Blindness Neg Hx     Diabetes Neg Hx     Glaucoma Neg Hx     Macular degeneration Neg Hx     Retinal detachment Neg Hx     Strabismus Neg Hx     Stroke Neg Hx     Thyroid disease Neg Hx        Review of Systems   Constitutional: Negative for chills, diaphoresis, fatigue and fever.   HENT: Negative for congestion, nosebleeds, postnasal drip, rhinorrhea, sinus pressure and sneezing.    Respiratory: Negative for cough, chest tightness, shortness of breath and wheezing.    Cardiovascular: Negative for chest pain, palpitations and leg swelling.   Gastrointestinal: Negative for abdominal pain, constipation, diarrhea, nausea and vomiting.   Genitourinary: Negative for dysuria, vaginal discharge and vaginal pain.   Musculoskeletal: Positive for arthralgias and joint swelling. Negative for back pain and myalgias.   Skin: Positive for rash. Negative for color change.   Neurological: Negative for dizziness, weakness,  light-headedness and headaches.       Objective:      Physical Exam   Constitutional: She is oriented to person, place, and time. She appears well-developed and well-nourished.   HENT:   Head: Normocephalic and atraumatic.   Right Ear: External ear normal.   Left Ear: External ear normal.   Nose: Nose normal.   Mouth/Throat: Oropharynx is clear and moist.   Eyes: Pupils are equal, round, and reactive to light. Conjunctivae and EOM are normal.   Cardiovascular: Normal rate, regular rhythm and normal heart sounds.   Pulmonary/Chest: Effort normal and breath sounds normal.   Musculoskeletal:        Right lower leg: She exhibits tenderness and swelling. She exhibits no bony tenderness, no edema, no deformity and no laceration.        Left lower leg: She exhibits tenderness and swelling. She exhibits no bony tenderness, no edema, no deformity and no laceration.   Neurological: She is alert and oriented to person, place, and time.   Skin: Skin is warm and dry. Capillary refill takes less than 2 seconds. Rash (bilateral legs) noted.   Vitals reviewed.      Assessment:       1. Pain and swelling of left lower extremity    2. Pain and swelling of right lower extremity    3. Venous stasis dermatitis of both lower extremities    4. Hypertension, benign    5. Type 2 diabetes mellitus without complication, without long-term current use of insulin        Plan:       Char was seen today for leg swelling.    Diagnoses and all orders for this visit:    Pain and swelling of left lower extremity  -     US Lower Extremity Veins Bilateral; Future    Pain and swelling of right lower extremity  -     US Lower Extremity Veins Bilateral; Future    Venous stasis dermatitis of both lower extremities  -     triamcinolone acetonide 0.1% (KENALOG) 0.1 % cream; Apply topically 2 (two) times daily.  -     US Lower Extremity Veins Bilateral; Future    Hypertension, benign  The current medical regimen is effective;  continue present plan and  medications.    Type 2 diabetes mellitus without complication, without long-term current use of insulin  The current medical regimen is effective;  continue present plan and medications.

## 2019-10-07 ENCOUNTER — TELEPHONE (OUTPATIENT)
Dept: FAMILY MEDICINE | Facility: CLINIC | Age: 79
End: 2019-10-07

## 2019-10-07 NOTE — TELEPHONE ENCOUNTER
----- Message from Heather Wetzel sent at 10/7/2019  3:02 PM CDT -----  Contact: Self/  [phone]  Type: Patient Call Back    Who called:  Patient    What is the request in detail:  Patient would like staff to give her a call.  She wanting to know if her results from her ultrasound are in yet.  Thank you    Would the patient rather a call back or a response via [facility]?  Call back    Best call back number:  [phone]

## 2019-10-17 ENCOUNTER — CLINICAL SUPPORT (OUTPATIENT)
Dept: FAMILY MEDICINE | Facility: CLINIC | Age: 79
End: 2019-10-17
Payer: MEDICARE

## 2019-10-17 VITALS — SYSTOLIC BLOOD PRESSURE: 136 MMHG | HEART RATE: 64 BPM | OXYGEN SATURATION: 99 % | DIASTOLIC BLOOD PRESSURE: 76 MMHG

## 2019-10-17 DIAGNOSIS — I10 HYPERTENSION, BENIGN: Primary | ICD-10-CM

## 2019-10-17 PROCEDURE — 99499 NO LOS: ICD-10-PCS | Mod: S$GLB,,, | Performed by: FAMILY MEDICINE

## 2019-10-17 PROCEDURE — 99999 PR PBB SHADOW E&M-EST. PATIENT-LVL II: ICD-10-PCS | Mod: PBBFAC,,,

## 2019-10-17 PROCEDURE — 99499 UNLISTED E&M SERVICE: CPT | Mod: S$GLB,,, | Performed by: FAMILY MEDICINE

## 2019-10-17 PROCEDURE — 99999 PR PBB SHADOW E&M-EST. PATIENT-LVL II: CPT | Mod: PBBFAC,,,

## 2019-10-17 NOTE — PROGRESS NOTES
Char Savage 79 y.o. female is here today for Blood Pressure check.   History of HTN yes.    Review of patient's allergies indicates:   Allergen Reactions    Amoxicillin Other (See Comments)    Bactrim [sulfamethoxazole-trimethoprim] Other (See Comments)    Darvon [propoxyphene] Other (See Comments)    Statins-hmg-coa reductase inhibitors      Flu symptoms    Toradol [ketorolac] Other (See Comments)    Vibramycin [doxycycline calcium] Rash     Creatinine   Date Value Ref Range Status   01/24/2019 1.2 0.5 - 1.4 mg/dL Final     Sodium   Date Value Ref Range Status   01/24/2019 142 136 - 145 mmol/L Final     Potassium   Date Value Ref Range Status   01/24/2019 4.6 3.5 - 5.1 mmol/L Final   ]  Patient verifies taking blood pressure medications on a regular basis at the same time of the day.     Current Outpatient Medications:     aspirin (ECOTRIN) 81 MG EC tablet, Take 81 mg by mouth once daily., Disp: , Rfl:     hydroCHLOROthiazide (HYDRODIURIL) 25 MG tablet, Take 1 tablet (25 mg total) by mouth once daily., Disp: 90 tablet, Rfl: 1    metoprolol tartrate (LOPRESSOR) 100 MG tablet, TAKE ONE Tablet BY MOUTH TWICE DAILY, Disp: 180 tablet, Rfl: 0    multivitamin (ONE DAILY MULTIVITAMIN) per tablet, Take 1 tablet by mouth once daily., Disp: , Rfl:     ranitidine (ZANTAC) 150 MG tablet, TAKE ONE Tablet BY MOUTH TWICE DAILY, Disp: 180 tablet, Rfl: 0    triamcinolone acetonide 0.1% (KENALOG) 0.1 % cream, Apply topically 2 (two) times daily., Disp: 45 g, Rfl: 1    valsartan (DIOVAN) 40 MG tablet, Take 1 tablet (40 mg total) by mouth once daily., Disp: 90 tablet, Rfl: 3  Does patient have record of home blood pressure readings no. .   Last dose of blood pressure medication was taken at 6:00 this am.  Patient is asymptomatic.   Complains of none.    Vitals:    10/17/19 0929   BP: 136/76   BP Location: Right arm   Patient Position: Sitting   BP Method: Large (Manual)   Pulse: 64   SpO2: 99%         Dr. Perez  informed of nurse visit.

## 2019-10-30 ENCOUNTER — OFFICE VISIT (OUTPATIENT)
Dept: FAMILY MEDICINE | Facility: CLINIC | Age: 79
End: 2019-10-30
Payer: MEDICARE

## 2019-10-30 VITALS
TEMPERATURE: 98 F | HEART RATE: 59 BPM | HEIGHT: 65 IN | BODY MASS INDEX: 33.24 KG/M2 | OXYGEN SATURATION: 97 % | SYSTOLIC BLOOD PRESSURE: 120 MMHG | WEIGHT: 199.5 LBS | DIASTOLIC BLOOD PRESSURE: 64 MMHG

## 2019-10-30 DIAGNOSIS — M16.0 PRIMARY OSTEOARTHRITIS OF BOTH HIPS: ICD-10-CM

## 2019-10-30 DIAGNOSIS — I87.2 VENOUS STASIS DERMATITIS OF BOTH LOWER EXTREMITIES: Primary | ICD-10-CM

## 2019-10-30 PROCEDURE — 99214 PR OFFICE/OUTPT VISIT, EST, LEVL IV, 30-39 MIN: ICD-10-PCS | Mod: S$GLB,,, | Performed by: FAMILY MEDICINE

## 2019-10-30 PROCEDURE — 3078F DIAST BP <80 MM HG: CPT | Mod: CPTII,S$GLB,, | Performed by: FAMILY MEDICINE

## 2019-10-30 PROCEDURE — 1101F PT FALLS ASSESS-DOCD LE1/YR: CPT | Mod: CPTII,S$GLB,, | Performed by: FAMILY MEDICINE

## 2019-10-30 PROCEDURE — 99999 PR PBB SHADOW E&M-EST. PATIENT-LVL III: ICD-10-PCS | Mod: PBBFAC,,, | Performed by: FAMILY MEDICINE

## 2019-10-30 PROCEDURE — 1101F PR PT FALLS ASSESS DOC 0-1 FALLS W/OUT INJ PAST YR: ICD-10-PCS | Mod: CPTII,S$GLB,, | Performed by: FAMILY MEDICINE

## 2019-10-30 PROCEDURE — 99999 PR PBB SHADOW E&M-EST. PATIENT-LVL III: CPT | Mod: PBBFAC,,, | Performed by: FAMILY MEDICINE

## 2019-10-30 PROCEDURE — 99214 OFFICE O/P EST MOD 30 MIN: CPT | Mod: S$GLB,,, | Performed by: FAMILY MEDICINE

## 2019-10-30 PROCEDURE — 3074F SYST BP LT 130 MM HG: CPT | Mod: CPTII,S$GLB,, | Performed by: FAMILY MEDICINE

## 2019-10-30 PROCEDURE — 3078F PR MOST RECENT DIASTOLIC BLOOD PRESSURE < 80 MM HG: ICD-10-PCS | Mod: CPTII,S$GLB,, | Performed by: FAMILY MEDICINE

## 2019-10-30 PROCEDURE — 3074F PR MOST RECENT SYSTOLIC BLOOD PRESSURE < 130 MM HG: ICD-10-PCS | Mod: CPTII,S$GLB,, | Performed by: FAMILY MEDICINE

## 2019-10-30 RX ORDER — TRIAMCINOLONE ACETONIDE 1 MG/G
CREAM TOPICAL 2 TIMES DAILY
Qty: 2000 G | Refills: 2 | Status: SHIPPED | OUTPATIENT
Start: 2019-10-30 | End: 2019-12-11

## 2019-10-30 NOTE — PROGRESS NOTES
Assessment & Plan  Problem List Items Addressed This Visit     None      Visit Diagnoses     Venous stasis dermatitis of both lower extremities    -  Primary    Relevant Medications    triamcinolone acetonide 0.1% (KENALOG) 0.1 % cream    Other Relevant Orders    COMPRESSION STOCKINGS    Ambulatory referral to Dermatology    Primary osteoarthritis of both hips            Topical prescription sent to compound pharmacy.  Will treat topically with her pain at this time.  Patient may benefit from further physical therapy.    Health Maintenance reviewed.    Follow-up: Follow up in about 3 months (around 1/30/2020).    ______________________________________________________________________    Chief Complaint  Chief Complaint   Patient presents with    Leg Pain    Rash    Follow-up       HPI  Char Savage is a 79 y.o. female with multiple medical diagnoses as listed in the medical history and problem list that presents for leg pain and rash.  Pt is known to me with last appointment 9/4/2019.      She reports that the rash has improved with triamcinolone.  She states that she has elevated her feet to help with the edema in her leg.  She indicates that it can be burning and stinging. She reports that is has not improved.  She has noted drainage in her legs.       Bilateral hip pain:  She has increased pain in her right and left hip.  She initially started with her right hip.  Her left hip began recently.  She was evaluated by an urgent care.  This has affected her ambulation.       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    Nevus of choroid     od    Type 2 diabetes mellitus without complication 10/17/2017       PAST SURGICAL HISTORY:  Past Surgical History:   Procedure Laterality Date    APPENDECTOMY      BREAST BIOPSY Bilateral     CATARACT EXTRACTION  1993/2000    od/os    COLONOSCOPY       HYSTERECTOMY      ORBITAL FRACTURE SURGERY  12/98    os dr coleman    PARTIAL HYSTERECTOMY      RIB FRACTURE SURGERY      TONSILLECTOMY, ADENOIDECTOMY         SOCIAL HISTORY:  Social History     Socioeconomic History    Marital status:      Spouse name: Not on file    Number of children: Not on file    Years of education: Not on file    Highest education level: Not on file   Occupational History    Not on file   Social Needs    Financial resource strain: Not on file    Food insecurity:     Worry: Not on file     Inability: Not on file    Transportation needs:     Medical: Not on file     Non-medical: Not on file   Tobacco Use    Smoking status: Never Smoker    Smokeless tobacco: Never Used   Substance and Sexual Activity    Alcohol use: No     Alcohol/week: 0.0 standard drinks    Drug use: No    Sexual activity: Not on file   Lifestyle    Physical activity:     Days per week: Not on file     Minutes per session: Not on file    Stress: Not on file   Relationships    Social connections:     Talks on phone: Not on file     Gets together: Not on file     Attends [employer] service: Not on file     Active member of club or organization: Not on file     Attends meetings of clubs or organizations: Not on file     Relationship status: Not on file   Other Topics Concern    Not on file   Social History Narrative    Not on file       FAMILY HISTORY:  Family History   Problem Relation Age of Onset    Cancer Mother     Breast cancer Mother     Cataracts Father     Hypertension Father     No Known Problems Sister     No Known Problems Brother     No Known Problems Maternal Aunt     No Known Problems Maternal Uncle     No Known Problems Paternal Aunt     No Known Problems Paternal Uncle     No Known Problems Maternal Grandmother     No Known Problems Maternal Grandfather     No Known Problems Paternal Grandmother     No Known Problems Paternal Grandfather     Amblyopia Neg Hx     Blindness  Neg Hx     Diabetes Neg Hx     Glaucoma Neg Hx     Macular degeneration Neg Hx     Retinal detachment Neg Hx     Strabismus Neg Hx     Stroke Neg Hx     Thyroid disease Neg Hx        ALLERGIES AND MEDICATIONS: updated and reviewed.  Review of patient's allergies indicates:   Allergen Reactions    Amoxicillin Other (See Comments)    Bactrim [sulfamethoxazole-trimethoprim] Other (See Comments)    Darvon [propoxyphene] Other (See Comments)    Statins-hmg-coa reductase inhibitors      Flu symptoms    Toradol [ketorolac] Other (See Comments)    Vibramycin [doxycycline calcium] Rash     Current Outpatient Medications   Medication Sig Dispense Refill    aspirin (ECOTRIN) 81 MG EC tablet Take 81 mg by mouth once daily.      hydroCHLOROthiazide (HYDRODIURIL) 25 MG tablet Take 1 tablet (25 mg total) by mouth once daily. 90 tablet 1    metoprolol tartrate (LOPRESSOR) 100 MG tablet TAKE ONE Tablet BY MOUTH TWICE DAILY 180 tablet 0    multivitamin (ONE DAILY MULTIVITAMIN) per tablet Take 1 tablet by mouth once daily.      ranitidine (ZANTAC) 150 MG tablet TAKE ONE Tablet BY MOUTH TWICE DAILY 180 tablet 0    valsartan (DIOVAN) 40 MG tablet Take 1 tablet (40 mg total) by mouth once daily. 90 tablet 3    triamcinolone acetonide 0.1% (KENALOG) 0.1 % cream Apply topically 2 (two) times daily. 2000 g 2     No current facility-administered medications for this visit.          ROS  Review of Systems   Constitutional: Negative for activity change, appetite change, fatigue, fever and unexpected weight change.   HENT: Negative.  Negative for ear discharge, ear pain, rhinorrhea and sore throat.    Eyes: Negative.    Respiratory: Negative for apnea, cough, chest tightness, shortness of breath and wheezing.    Cardiovascular: Negative for chest pain, palpitations and leg swelling.   Gastrointestinal: Negative for abdominal distention, abdominal pain, constipation, diarrhea and vomiting.   Endocrine: Negative for cold  "intolerance, heat intolerance, polydipsia and polyuria.   Genitourinary: Negative for decreased urine volume and urgency.   Musculoskeletal: Positive for gait problem and joint swelling.   Skin: Negative for rash.   Neurological: Negative for dizziness and headaches.   Hematological: Does not bruise/bleed easily.   Psychiatric/Behavioral: Negative for agitation, sleep disturbance and suicidal ideas.           Physical Exam  Vitals:    10/30/19 0948   BP: 120/64   BP Location: Left arm   Patient Position: Sitting   BP Method: Large (Manual)   Pulse: (!) 59   Temp: 97.9 °F (36.6 °C)   TempSrc: Oral   SpO2: 97%   Weight: 90.5 kg (199 lb 8.3 oz)   Height: 5' 5" (1.651 m)    Body mass index is 33.2 kg/m².  Weight: 90.5 kg (199 lb 8.3 oz)   Height: 5' 5" (165.1 cm)   Physical Exam   Constitutional: She is oriented to person, place, and time. She appears well-developed and well-nourished.   HENT:   Head: Normocephalic and atraumatic.   Right Ear: External ear normal.   Left Ear: External ear normal.   Nose: Nose normal.   Mouth/Throat: Oropharynx is clear and moist.   Eyes: Pupils are equal, round, and reactive to light. Conjunctivae and EOM are normal.   Cardiovascular: Normal rate, regular rhythm and normal heart sounds.   Pulmonary/Chest: Effort normal and breath sounds normal.   Neurological: She is alert and oriented to person, place, and time.   Skin: Skin is warm and dry.        Vitals reviewed.        Health Maintenance       Date Due Completion Date    Foot Exam 06/06/2019 6/6/2018    Eye Exam 10/16/2019 10/16/2018    Override on 10/10/2017: Done    TETANUS VACCINE 09/04/2020 (Originally 8/3/1958) ---    Shingles Vaccine (1 of 2) 09/04/2020 (Originally 8/3/1990) ---    Lipid Panel 11/15/2019 11/15/2018    Hemoglobin A1c 02/29/2020 8/29/2019    DEXA SCAN 10/30/2020 10/30/2017              Patient note was created using [employer].  Any errors in syntax or even information may not have been identified and edited on " initial review prior to signing this note.

## 2019-11-07 ENCOUNTER — PATIENT OUTREACH (OUTPATIENT)
Dept: ADMINISTRATIVE | Facility: OTHER | Age: 79
End: 2019-11-07

## 2019-11-12 ENCOUNTER — OFFICE VISIT (OUTPATIENT)
Dept: OPTOMETRY | Facility: CLINIC | Age: 79
End: 2019-11-12
Payer: MEDICARE

## 2019-11-12 DIAGNOSIS — H52.203 MYOPIA WITH ASTIGMATISM AND PRESBYOPIA, BILATERAL: ICD-10-CM

## 2019-11-12 DIAGNOSIS — E11.9 DIABETES MELLITUS TYPE 2 WITHOUT RETINOPATHY: Primary | ICD-10-CM

## 2019-11-12 DIAGNOSIS — Z96.1 PSEUDOPHAKIA OF BOTH EYES: ICD-10-CM

## 2019-11-12 DIAGNOSIS — H52.4 MYOPIA WITH ASTIGMATISM AND PRESBYOPIA, BILATERAL: ICD-10-CM

## 2019-11-12 DIAGNOSIS — H52.13 MYOPIA WITH ASTIGMATISM AND PRESBYOPIA, BILATERAL: ICD-10-CM

## 2019-11-12 DIAGNOSIS — H40.013 OAG (OPEN ANGLE GLAUCOMA) SUSPECT, LOW RISK, BILATERAL: ICD-10-CM

## 2019-11-12 PROCEDURE — 92133 OCT, OPTIC NERVE - OU - BOTH EYES: ICD-10-PCS | Mod: S$GLB,,, | Performed by: OPTOMETRIST

## 2019-11-12 PROCEDURE — 92014 COMPRE OPH EXAM EST PT 1/>: CPT | Mod: S$GLB,,, | Performed by: OPTOMETRIST

## 2019-11-12 PROCEDURE — 99999 PR PBB SHADOW E&M-EST. PATIENT-LVL II: CPT | Mod: PBBFAC,,, | Performed by: OPTOMETRIST

## 2019-11-12 PROCEDURE — 92015 DETERMINE REFRACTIVE STATE: CPT | Mod: S$GLB,,, | Performed by: OPTOMETRIST

## 2019-11-12 PROCEDURE — 92014 PR EYE EXAM, EST PATIENT,COMPREHESV: ICD-10-PCS | Mod: S$GLB,,, | Performed by: OPTOMETRIST

## 2019-11-12 PROCEDURE — 99999 PR PBB SHADOW E&M-EST. PATIENT-LVL II: ICD-10-PCS | Mod: PBBFAC,,, | Performed by: OPTOMETRIST

## 2019-11-12 PROCEDURE — 92133 CPTRZD OPH DX IMG PST SGM ON: CPT | Mod: S$GLB,,, | Performed by: OPTOMETRIST

## 2019-11-12 PROCEDURE — 92015 PR REFRACTION: ICD-10-PCS | Mod: S$GLB,,, | Performed by: OPTOMETRIST

## 2019-11-12 NOTE — PROGRESS NOTES
HPI     Patient is here for diabetic eye exam.  Blur at distance while driving. Mostly ok  Hemoglobin A1C       Date                     Value               Ref Range             Status                08/29/2019               6.8 (H)             4.0 - 5.6 %           Final                   11/15/2018               6.3 (H)             4.0 - 5.6 %           Final                  06/06/2018               6.2 (H)             4.0 - 5.6 %           Final                ----------      Last edited by Julien Ferrari, OD on 11/12/2019  2:10 PM. (History)            Assessment /Plan     For exam results, see Encounter Report.    Diabetes mellitus type 2 without retinopathy  -No retinopathy noted today.  Continued control with primary care physician and annual comprehensive eye exam.    OAG (open angle glaucoma) suspect, low risk, bilateral  -     OCT, Optic Nerve - OU - Both Eyes  -OCT Thinning OD, OD WNL  -monitor as high risk    Pseudophakia of both eyes  -clear, centered    Myopia with astigmatism and presbyopia, bilateral  Eyeglass Final Rx     Eyeglass Final Rx       Sphere Cylinder Axis Dist VA Add    Right -2.25 +3.00 010 20/25 +2.50    Left -1.25 +3.00 165 20/25 +2.50    Type:  PAL    Expiration Date:  11/12/2020                  RTC 1 yr

## 2019-11-19 ENCOUNTER — TELEPHONE (OUTPATIENT)
Dept: FAMILY MEDICINE | Facility: CLINIC | Age: 79
End: 2019-11-19

## 2019-11-19 NOTE — TELEPHONE ENCOUNTER
Patient states she has rash to legs , has derm appt and wanting to know if provider can get her in sooner as rash is increasing to arms and wrist. Please advise

## 2019-11-19 NOTE — TELEPHONE ENCOUNTER
----- Message from Trudy Shah sent at 11/19/2019  2:10 PM CST -----  Contact: Self   Type: Patient Call Back    What is the request in detail:  Pt requesting to speak to a nurse regarding rash on legs.     Can the clinic reply by [facility]? No    Would the patient rather a call back or a response via [facility]? Call back     Best call back number:[phone]

## 2019-11-20 ENCOUNTER — OFFICE VISIT (OUTPATIENT)
Dept: FAMILY MEDICINE | Facility: CLINIC | Age: 79
End: 2019-11-20
Payer: MEDICARE

## 2019-11-20 VITALS
HEART RATE: 72 BPM | WEIGHT: 200.63 LBS | OXYGEN SATURATION: 98 % | DIASTOLIC BLOOD PRESSURE: 100 MMHG | BODY MASS INDEX: 33.43 KG/M2 | HEIGHT: 65 IN | SYSTOLIC BLOOD PRESSURE: 170 MMHG | TEMPERATURE: 98 F

## 2019-11-20 DIAGNOSIS — N18.30 TYPE 2 DIABETES MELLITUS WITH STAGE 3 CHRONIC KIDNEY DISEASE, WITHOUT LONG-TERM CURRENT USE OF INSULIN: ICD-10-CM

## 2019-11-20 DIAGNOSIS — E11.22 TYPE 2 DIABETES MELLITUS WITH STAGE 3 CHRONIC KIDNEY DISEASE, WITHOUT LONG-TERM CURRENT USE OF INSULIN: ICD-10-CM

## 2019-11-20 DIAGNOSIS — N18.30 BENIGN HYPERTENSION WITH CHRONIC KIDNEY DISEASE, STAGE III: ICD-10-CM

## 2019-11-20 DIAGNOSIS — I12.9 BENIGN HYPERTENSION WITH CHRONIC KIDNEY DISEASE, STAGE III: ICD-10-CM

## 2019-11-20 DIAGNOSIS — R21 RASH AND NONSPECIFIC SKIN ERUPTION: Primary | ICD-10-CM

## 2019-11-20 PROCEDURE — 3077F SYST BP >= 140 MM HG: CPT | Mod: CPTII,S$GLB,, | Performed by: NURSE PRACTITIONER

## 2019-11-20 PROCEDURE — 3080F PR MOST RECENT DIASTOLIC BLOOD PRESSURE >= 90 MM HG: ICD-10-PCS | Mod: CPTII,S$GLB,, | Performed by: NURSE PRACTITIONER

## 2019-11-20 PROCEDURE — 99499 UNLISTED E&M SERVICE: CPT | Mod: S$GLB,,, | Performed by: NURSE PRACTITIONER

## 2019-11-20 PROCEDURE — 99999 PR PBB SHADOW E&M-EST. PATIENT-LVL V: ICD-10-PCS | Mod: PBBFAC,,, | Performed by: NURSE PRACTITIONER

## 2019-11-20 PROCEDURE — 99499 RISK ADDL DX/OHS AUDIT: ICD-10-PCS | Mod: S$GLB,,, | Performed by: NURSE PRACTITIONER

## 2019-11-20 PROCEDURE — 3080F DIAST BP >= 90 MM HG: CPT | Mod: CPTII,S$GLB,, | Performed by: NURSE PRACTITIONER

## 2019-11-20 PROCEDURE — 99214 OFFICE O/P EST MOD 30 MIN: CPT | Mod: S$GLB,,, | Performed by: NURSE PRACTITIONER

## 2019-11-20 PROCEDURE — 1125F AMNT PAIN NOTED PAIN PRSNT: CPT | Mod: S$GLB,,, | Performed by: NURSE PRACTITIONER

## 2019-11-20 PROCEDURE — 1101F PT FALLS ASSESS-DOCD LE1/YR: CPT | Mod: CPTII,S$GLB,, | Performed by: NURSE PRACTITIONER

## 2019-11-20 PROCEDURE — 3077F PR MOST RECENT SYSTOLIC BLOOD PRESSURE >= 140 MM HG: ICD-10-PCS | Mod: CPTII,S$GLB,, | Performed by: NURSE PRACTITIONER

## 2019-11-20 PROCEDURE — 99214 PR OFFICE/OUTPT VISIT, EST, LEVL IV, 30-39 MIN: ICD-10-PCS | Mod: S$GLB,,, | Performed by: NURSE PRACTITIONER

## 2019-11-20 PROCEDURE — 1159F MED LIST DOCD IN RCRD: CPT | Mod: S$GLB,,, | Performed by: NURSE PRACTITIONER

## 2019-11-20 PROCEDURE — 99999 PR PBB SHADOW E&M-EST. PATIENT-LVL V: CPT | Mod: PBBFAC,,, | Performed by: NURSE PRACTITIONER

## 2019-11-20 PROCEDURE — 1125F PR PAIN SEVERITY QUANTIFIED, PAIN PRESENT: ICD-10-PCS | Mod: S$GLB,,, | Performed by: NURSE PRACTITIONER

## 2019-11-20 PROCEDURE — 1159F PR MEDICATION LIST DOCUMENTED IN MEDICAL RECORD: ICD-10-PCS | Mod: S$GLB,,, | Performed by: NURSE PRACTITIONER

## 2019-11-20 PROCEDURE — 1101F PR PT FALLS ASSESS DOC 0-1 FALLS W/OUT INJ PAST YR: ICD-10-PCS | Mod: CPTII,S$GLB,, | Performed by: NURSE PRACTITIONER

## 2019-11-20 RX ORDER — CLINDAMYCIN HYDROCHLORIDE 300 MG/1
300 CAPSULE ORAL EVERY 8 HOURS
Qty: 30 CAPSULE | Refills: 0 | Status: SHIPPED | OUTPATIENT
Start: 2019-11-20 | End: 2019-11-20

## 2019-11-20 RX ORDER — PREDNISONE 10 MG/1
TABLET ORAL
Qty: 23 TABLET | Refills: 0 | Status: SHIPPED | OUTPATIENT
Start: 2019-11-20 | End: 2019-12-11

## 2019-11-20 NOTE — ASSESSMENT & PLAN NOTE
This problem is currently not controlled. Please follow up with your PCP as planned to discuss adjustments to your treatment plan.  The patient is asked to make an attempt to improve diet and exercise patterns to aid in medical management of this problem.  Took her blood pressure medication just prior to appt.   Blood pressure repeated in clinic  She has an appt in 2 weeks with PCP

## 2019-11-20 NOTE — PATIENT INSTRUCTIONS
Nonspecific Dermatitis  Dermatitis is a skin rash caused by something that touches the skin and makes it irritated and inflamed.  Your skin may be red, swollen, dry, and may be cracked. Blisters may form and ooze. The rash will itch.  Dermatitis can form on the face and neck, backs of hands, forearms, genitals, and lower legs. Dermatitis is not passed from person to person.  Talk with your health care provider about what may have caused the rash. Common things that cause skin allergies are metal in jewelry, plants like poison ivy or poison oak, and certain skin care products. You will need to avoid the source of your rash in the future to prevent it from coming back. In some cases, the cause of the dermatitis may not be found.  Treatment is done to relieve itching and prevent the rash from coming back. The rash should go away in a few days to a few weeks.  Home care  The health care provider may prescribe medications to relieve swelling and itching. Follow all instructions when using these medications.  · Avoid anything that heats up your skin, such as hot showers or baths, or direct sunlight. This can make itching worse.  · Stay away from whatever you think caused the rash.  · Bathe in warm, not hot, water. Apply a moisturizing lotion after bathing to prevent dry skin.  · Avoid skin irritants such as wool or silk clothing, grease, oils, harsh soaps, and detergents.  · Apply cold compresses to soothe your sores to help relieve your symptoms. Do this for 30 minutes 3 to 4 times a day. You can make a cold compress by soaking a cloth in cold water. Squeeze out excess water. You can add colloidal oatmeal to the water to help reduce itching. For severe itching in a small area, apply an ice pack wrapped in a thin towel. Do this for 20 minutes 3 to 4 times a day.  · You can also help relieve large areas of itching by taking a lukewarm bath with colloidal oatmeal added to the water.  · Use hydrocortisone cream for redness  and irritation, unless another medicine was prescribed. You can also use benzocaine anesthetic cream or spray.  · Use oral diphenhydramine to help reduce itching. This is an antihistamine you can buy at drug and grocery stores. It can make you sleepy, so use lower doses during the daytime. Or you can use loratadine. This is an antihistamine that will not make you sleepy. Dont use diphenhydramine if you have glaucoma or have trouble urinating because of an enlarged prostate.  · Wash your hands or use an antibacterial gel often to prevent the spread of the rash.  Follow-up care  Follow up with your health care provider. Make an appointment with your health care provider if your symptoms do not get better in the next 1 to 2 weeks.  When to seek medical advice  Call your health care provider right away if any of these occur:  · Spreading of the rash to other parts of your body  · Severe swelling of your face, eyelids, mouth, throat or tongue  · Trouble urinating due to swelling in the genital area  · Fever of 100.4°F (38°C) or higher  · Redness or swelling that gets worse  · Pain that gets worse  · Foul-smelling fluid leaking from the skin  · Yellow-brown crusts on the open blisters  · Joint pain   Date Last Reviewed: 7/23/2014  © 7397-6131 [employer]. [address] [place] [address], [place] [address]. All rights reserved. This information is not intended as a substitute for professional medical care. Always follow your healthcare professional's instructions.        Self-Care for Skin Rashes     Pat your skin dry. Do not rub.     When your skin reacts to a substance your body is sensitive to, it can cause a rash. You can treat most rashes at home by keeping the skin clean and dry. Many rashes may get better on their own within 2 to 3 days. You may need medical attention if your rash itches, drains, or hurts, particularly if the rash is getting worse.  What can cause a skin rash?  · Sun poisoning, caused by too  much exposure to the sun  · An irritant or allergic reaction to a certain type of food, plant, or chemical, such as  shellfish, poison ivy, and or cleaning products  · An infection caused by a fungus (ringworm), virus (chickenpox), or bacteria (strep)  · Bites or infestation caused by insects or pests, such as ticks, lice, or mites  · Dry skin, which is often seen during the winter months and in older people  How can I control itching and skin damage?  · Take soothing lukewarm baths in a colloidal oatmeal product. You can buy this at the [employer].  · Do your best not to scratch. Clip fingernails short, especially in young children, to reduce skin damage if scratching does occur.  · Use moisturizing skin lotion instead of scratching your dry skin.  · Use sunscreen whenever going out into direct sun.  · Use only mild cleansing agents whenever possible.  · Wash with mild, nonirritating soap and warm water.  · Wear clothing that breathes, such as cotton shirts or canvas shoes.  · If fluid is seeping from the rash, cover it loosely with clean gauze to absorb the discharge.  · Many rashes are contagious. Prevent the rash from spreading to others by washing your hands often before or after touching others with any skin rash.  Use medicine  · Antihistamines such as diphenhydramine can help control itching. But use with caution because they can make you drowsy.  · Using over-the-counter hydrocortisone cream on small rashes may help reduce swelling and itching  · Most over-the-counter antifungal medicines can treat athletes foot and many other fungal infections of the skin.  Check with your healthcare provider  Call your healthcare provider if:  · You were told that you have a fungal infection on your skin to make sure you have the correct type of medicine.  · You have questions or concerns about medicines or their side effects.     Call 911  Call 911 if either of these occur:  · Your tongue or lips start to swell  · You  have difficulty breathing      Call your healthcare provider  Call your healthcare provider if any of these occur:  · Temperature of more than 101.0°F (38.3°C), or as directed  · Sore throat, a cough, or unusual fatigue  · Red, oozy, or painful rash gets worse. These are signs of infection.  · Rash covers your face, genitals, or most of your body  · Crusty sores or red rings that begin to spread  · You were exposed to someone who has a contagious rash, such as scabies or lice.  · Red bulls-eye rash with a white center (a sign of Lyme disease)  · You were told that you have resistant bacteria (MRSA) on your skin.   Date Last Reviewed: 5/12/2015  © 6218-5584 [employer]. [address] [place] [address], [place] [address]. All rights reserved. This information is not intended as a substitute for professional medical care. Always follow your healthcare professional's instructions.         Yes

## 2019-11-20 NOTE — PROGRESS NOTES
Subjective:       Patient ID: Char Savage is a 79 y.o. female.    Chief Complaint: Rash (rash bilateral legs and arms )    Rash   This is a recurrent problem. The current episode started in the past 7 days (On Monday). The problem has been gradually worsening since onset. The affected locations include the left arm and right arm (neck and lower abdomen). The rash is characterized by dryness, itchiness and redness. It is unknown if there was an exposure to a precipitant. Pertinent negatives include no diarrhea, fever, rhinorrhea or shortness of breath. Past treatments include nothing.       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    Nevus of choroid     od    Type 2 diabetes mellitus without complication 10/17/2017       Social History     Socioeconomic History    Marital status:      Spouse name: Not on file    Number of children: Not on file    Years of education: Not on file    Highest education level: Not on file   Occupational History    Not on file   Social Needs    Financial resource strain: Not on file    Food insecurity:     Worry: Not on file     Inability: Not on file    Transportation needs:     Medical: Not on file     Non-medical: Not on file   Tobacco Use    Smoking status: Never Smoker    Smokeless tobacco: Never Used   Substance and Sexual Activity    Alcohol use: No     Alcohol/week: 0.0 standard drinks    Drug use: No    Sexual activity: Not on file   Lifestyle    Physical activity:     Days per week: Not on file     Minutes per session: Not on file    Stress: Not on file   Relationships    Social connections:     Talks on phone: Not on file     Gets together: Not on file     Attends Oriental orthodox service: Not on file     Active member of club or organization: Not on file     Attends meetings of clubs or organizations: Not on file     Relationship status: Not on file   Other Topics  "Concern    Not on file   Social History Narrative    Not on file       Past Surgical History:   Procedure Laterality Date    APPENDECTOMY      BREAST BIOPSY Bilateral     CATARACT EXTRACTION  1993/2000    od/os    COLONOSCOPY      HYSTERECTOMY      ORBITAL FRACTURE SURGERY  12/98    os dr coleman    PARTIAL HYSTERECTOMY      RIB FRACTURE SURGERY      TONSILLECTOMY, ADENOIDECTOMY         Review of Systems   Constitutional: Negative for fever.   HENT: Negative for rhinorrhea.    Respiratory: Negative for shortness of breath.    Gastrointestinal: Negative for diarrhea.   Skin: Positive for rash.   All other systems reviewed and are negative.      Objective:   BP (!) 170/100 (BP Location: Left arm, Patient Position: Sitting, BP Method: Large (Manual))   Pulse 72   Temp 98.1 °F (36.7 °C) (Oral)   Ht 5' 5" (1.651 m)   Wt 91 kg (200 lb 9.9 oz)   SpO2 98%   BMI 33.38 kg/m²      Physical Exam   Constitutional: She is oriented to person, place, and time. She appears well-developed and well-nourished.   HENT:   Head: Normocephalic and atraumatic.   Cardiovascular: Normal rate, regular rhythm and normal heart sounds.   Pulmonary/Chest: Effort normal and breath sounds normal. No respiratory distress. She has no decreased breath sounds.   Neurological: She is alert and oriented to person, place, and time.   Skin: Rash noted. Rash is urticarial. She is not diaphoretic. No pallor.   Psychiatric: She has a normal mood and affect. Her speech is normal and behavior is normal.             Assessment:       1. Rash and nonspecific skin eruption    2. Type 2 diabetes mellitus with stage 3 chronic kidney disease, without long-term current use of insulin    3. Benign hypertension with chronic kidney disease, stage III        Plan:       Char was seen today for rash.    Diagnoses and all orders for this visit:    Rash and nonspecific skin eruption  -     predniSONE (DELTASONE) 10 MG tablet; Take 6 pills today, 5 pills " tomorrow, 4 pills day 3, 3 pills day 4, 2 pills day 5, then 1 pill for the next 3 days  · Avoid anything that heats up your skin, such as hot showers or baths, or direct sunlight. This can make itching worse.  · Stay away from whatever you think caused the rash.  · Bathe in warm, not hot, water. Apply a moisturizing lotion after bathing to prevent dry skin.  · Avoid skin irritants such as wool or silk clothing, grease, oils, harsh soaps, and detergents.  · Apply cold compresses to soothe your sores to help relieve your symptoms. Do this for 30 minutes 3 to 4 times a day. You can make a cold compress by soaking a cloth in cold water. Squeeze out excess water. You can add colloidal oatmeal to the water to help reduce itching. For severe itching in a small area, apply an ice pack wrapped in a thin towel. Do this for 20 minutes 3 to 4 times a day.  · You can also help relieve large areas of itching by taking a lukewarm bath with colloidal oatmeal added to the water.  · Use hydrocortisone cream for redness and irritation, unless another medicine was prescribed. You can also use benzocaine anesthetic cream or spray.  · Use oral diphenhydramine to help reduce itching. This is an antihistamine you can buy at drug and grocery stores. It can make you sleepy, so use lower doses during the daytime. Or you can use loratadine. This is an antihistamine that will not make you sleepy. Dont use diphenhydramine if you have glaucoma or have trouble urinating because of an enlarged prostate.  · Wash your hands or use an antibacterial gel often to prevent the spread of the rash.      Problem List Items Addressed This Visit     Type 2 diabetes mellitus with stage 3 chronic kidney disease, without long-term current use of insulin    Overview     Cannot tolerate metformin          Benign hypertension with chronic kidney disease, stage III    Overview     Failed irbesartan secondary to hyperkalemia          Current Assessment & Plan      This problem is currently not controlled. Please follow up with your PCP as planned to discuss adjustments to your treatment plan.  The patient is asked to make an attempt to improve diet and exercise patterns to aid in medical management of this problem.  Took her blood pressure medication just prior to appt.   Blood pressure repeated in clinic  She has an appt in 2 weeks with PCP           Other Visit Diagnoses     Rash and nonspecific skin eruption    -  Primary          Follow up if symptoms worsen or fail to improve.

## 2019-12-02 DIAGNOSIS — I10 HYPERTENSION, BENIGN: ICD-10-CM

## 2019-12-02 RX ORDER — HYDROCHLOROTHIAZIDE 25 MG/1
TABLET ORAL
Qty: 90 TABLET | Refills: 0 | Status: SHIPPED | OUTPATIENT
Start: 2019-12-02 | End: 2020-01-02

## 2019-12-11 ENCOUNTER — OFFICE VISIT (OUTPATIENT)
Dept: FAMILY MEDICINE | Facility: CLINIC | Age: 79
End: 2019-12-11
Payer: MEDICARE

## 2019-12-11 VITALS
HEART RATE: 59 BPM | WEIGHT: 203.06 LBS | TEMPERATURE: 98 F | DIASTOLIC BLOOD PRESSURE: 70 MMHG | HEIGHT: 65 IN | BODY MASS INDEX: 33.83 KG/M2 | OXYGEN SATURATION: 98 % | SYSTOLIC BLOOD PRESSURE: 130 MMHG

## 2019-12-11 DIAGNOSIS — E11.22 TYPE 2 DIABETES MELLITUS WITH STAGE 3 CHRONIC KIDNEY DISEASE, WITHOUT LONG-TERM CURRENT USE OF INSULIN: ICD-10-CM

## 2019-12-11 DIAGNOSIS — I87.2 VENOUS STASIS DERMATITIS OF BOTH LOWER EXTREMITIES: Primary | ICD-10-CM

## 2019-12-11 DIAGNOSIS — N18.30 TYPE 2 DIABETES MELLITUS WITH STAGE 3 CHRONIC KIDNEY DISEASE, WITHOUT LONG-TERM CURRENT USE OF INSULIN: ICD-10-CM

## 2019-12-11 PROCEDURE — 1159F PR MEDICATION LIST DOCUMENTED IN MEDICAL RECORD: ICD-10-PCS | Mod: S$GLB,,, | Performed by: FAMILY MEDICINE

## 2019-12-11 PROCEDURE — 1126F AMNT PAIN NOTED NONE PRSNT: CPT | Mod: S$GLB,,, | Performed by: FAMILY MEDICINE

## 2019-12-11 PROCEDURE — 99213 OFFICE O/P EST LOW 20 MIN: CPT | Mod: S$GLB,,, | Performed by: FAMILY MEDICINE

## 2019-12-11 PROCEDURE — 99499 RISK ADDL DX/OHS AUDIT: ICD-10-PCS | Mod: S$GLB,,, | Performed by: FAMILY MEDICINE

## 2019-12-11 PROCEDURE — 1126F PR PAIN SEVERITY QUANTIFIED, NO PAIN PRESENT: ICD-10-PCS | Mod: S$GLB,,, | Performed by: FAMILY MEDICINE

## 2019-12-11 PROCEDURE — 1101F PT FALLS ASSESS-DOCD LE1/YR: CPT | Mod: CPTII,S$GLB,, | Performed by: FAMILY MEDICINE

## 2019-12-11 PROCEDURE — 3075F PR MOST RECENT SYSTOLIC BLOOD PRESS GE 130-139MM HG: ICD-10-PCS | Mod: CPTII,S$GLB,, | Performed by: FAMILY MEDICINE

## 2019-12-11 PROCEDURE — 99999 PR PBB SHADOW E&M-EST. PATIENT-LVL III: ICD-10-PCS | Mod: PBBFAC,,, | Performed by: FAMILY MEDICINE

## 2019-12-11 PROCEDURE — 3078F PR MOST RECENT DIASTOLIC BLOOD PRESSURE < 80 MM HG: ICD-10-PCS | Mod: CPTII,S$GLB,, | Performed by: FAMILY MEDICINE

## 2019-12-11 PROCEDURE — 99499 UNLISTED E&M SERVICE: CPT | Mod: S$GLB,,, | Performed by: FAMILY MEDICINE

## 2019-12-11 PROCEDURE — 3075F SYST BP GE 130 - 139MM HG: CPT | Mod: CPTII,S$GLB,, | Performed by: FAMILY MEDICINE

## 2019-12-11 PROCEDURE — 1159F MED LIST DOCD IN RCRD: CPT | Mod: S$GLB,,, | Performed by: FAMILY MEDICINE

## 2019-12-11 PROCEDURE — 99999 PR PBB SHADOW E&M-EST. PATIENT-LVL III: CPT | Mod: PBBFAC,,, | Performed by: FAMILY MEDICINE

## 2019-12-11 PROCEDURE — 99213 PR OFFICE/OUTPT VISIT, EST, LEVL III, 20-29 MIN: ICD-10-PCS | Mod: S$GLB,,, | Performed by: FAMILY MEDICINE

## 2019-12-11 PROCEDURE — 3078F DIAST BP <80 MM HG: CPT | Mod: CPTII,S$GLB,, | Performed by: FAMILY MEDICINE

## 2019-12-11 PROCEDURE — 1101F PR PT FALLS ASSESS DOC 0-1 FALLS W/OUT INJ PAST YR: ICD-10-PCS | Mod: CPTII,S$GLB,, | Performed by: FAMILY MEDICINE

## 2019-12-11 RX ORDER — MOMETASONE FUROATE 1 MG/G
1 CREAM TOPICAL 2 TIMES DAILY
COMMUNITY
End: 2023-11-17 | Stop reason: ALTCHOICE

## 2019-12-11 NOTE — PROGRESS NOTES
Assessment & Plan  Problem List Items Addressed This Visit        Cardiac/Vascular    Venous stasis dermatitis of both lower extremities - Primary    Current Assessment & Plan     Currently on mometasone.  Improving the rash.              Endocrine    Type 2 diabetes mellitus with stage 3 chronic kidney disease, without long-term current use of insulin    Overview     Cannot tolerate metformin          Current Assessment & Plan     Pt is currently stable on medication regimen.  Continue current therapy as scheduled.  Contact office with any questions about adjustments on medications.                    Health Maintenance reviewed.    Follow-up: Follow up in about 3 months (around 3/11/2020).    ______________________________________________________________________    Chief Complaint  Chief Complaint   Patient presents with    Rash    Follow-up       HPI  Char Savage is a 79 y.o. female with multiple medical diagnoses as listed in the medical history and problem list that presents for rash.  Pt is known to me with last appointment 10/30/2019.    Rash:  Patient has venous stasis dermatitis has improved with mometasone.  She was previously on triamcinolone.  This did not clear up the rash.  She presents in the office today with improved rash on her lower extremities bilaterally.  Patient did have a rash on her upper extremities as well as her back.  Patient has use mometasone for this area.  This has improved her rash.  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    Nevus of choroid     od    Type 2 diabetes mellitus without complication 10/17/2017       PAST SURGICAL HISTORY:  Past Surgical History:   Procedure Laterality Date    APPENDECTOMY      BREAST BIOPSY Bilateral     CATARACT EXTRACTION  1993/2000    od/os    COLONOSCOPY       HYSTERECTOMY      ORBITAL FRACTURE SURGERY  12/98    os dr coleman    PARTIAL HYSTERECTOMY      RIB FRACTURE SURGERY      TONSILLECTOMY, ADENOIDECTOMY         SOCIAL HISTORY:  Social History     Socioeconomic History    Marital status:      Spouse name: Not on file    Number of children: Not on file    Years of education: Not on file    Highest education level: Not on file   Occupational History    Not on file   Social Needs    Financial resource strain: Not on file    Food insecurity:     Worry: Not on file     Inability: Not on file    Transportation needs:     Medical: Not on file     Non-medical: Not on file   Tobacco Use    Smoking status: Never Smoker    Smokeless tobacco: Never Used   Substance and Sexual Activity    Alcohol use: No     Alcohol/week: 0.0 standard drinks    Drug use: No    Sexual activity: Not on file   Lifestyle    Physical activity:     Days per week: Not on file     Minutes per session: Not on file    Stress: Not on file   Relationships    Social connections:     Talks on phone: Not on file     Gets together: Not on file     Attends Latter day service: Not on file     Active member of club or organization: Not on file     Attends meetings of clubs or organizations: Not on file     Relationship status: Not on file   Other Topics Concern    Not on file   Social History Narrative    Not on file       FAMILY HISTORY:  Family History   Problem Relation Age of Onset    Cancer Mother     Breast cancer Mother     Cataracts Father     Hypertension Father     No Known Problems Sister     No Known Problems Brother     No Known Problems Maternal Aunt     No Known Problems Maternal Uncle     No Known Problems Paternal Aunt     No Known Problems Paternal Uncle     No Known Problems Maternal Grandmother     No Known Problems Maternal Grandfather     No Known Problems Paternal Grandmother     No Known Problems Paternal Grandfather     Amblyopia Neg Hx     Blindness  Neg Hx     Diabetes Neg Hx     Glaucoma Neg Hx     Macular degeneration Neg Hx     Retinal detachment Neg Hx     Strabismus Neg Hx     Stroke Neg Hx     Thyroid disease Neg Hx        ALLERGIES AND MEDICATIONS: updated and reviewed.  Review of patient's allergies indicates:   Allergen Reactions    Amoxicillin Other (See Comments)    Bactrim [sulfamethoxazole-trimethoprim] Other (See Comments)    Darvon [propoxyphene] Other (See Comments)    Statins-hmg-coa reductase inhibitors      Flu symptoms    Toradol [ketorolac] Other (See Comments)    Vibramycin [doxycycline calcium] Rash     Current Outpatient Medications   Medication Sig Dispense Refill    aspirin (ECOTRIN) 81 MG EC tablet Take 81 mg by mouth once daily.      hydroCHLOROthiazide (HYDRODIURIL) 25 MG tablet TAKE ONE Tablet BY MOUTH EVERY DAY 90 tablet 0    KETOPROFEN, MICRONIZED TOP APPLY UP TO 3.2 GRAMS (2 PUMPS) TO PAINFUL AREAS UP TO FIVE TIMES DAILY. RUB IN WELL  3    metoprolol tartrate (LOPRESSOR) 100 MG tablet TAKE ONE Tablet BY MOUTH TWICE DAILY 180 tablet 0    mometasone 0.1% (ELOCON) 0.1 % cream Apply 1 application topically 2 (two) times daily.      multivitamin (ONE DAILY MULTIVITAMIN) per tablet Take 1 tablet by mouth once daily.      ranitidine (ZANTAC) 150 MG tablet TAKE ONE Tablet BY MOUTH TWICE DAILY 180 tablet 0    valsartan (DIOVAN) 40 MG tablet Take 1 tablet (40 mg total) by mouth once daily. 90 tablet 3     No current facility-administered medications for this visit.          ROS  Review of Systems   Constitutional: Negative for activity change, appetite change, fatigue, fever and unexpected weight change.   HENT: Negative.  Negative for ear discharge, ear pain, rhinorrhea and sore throat.    Eyes: Negative.    Respiratory: Negative for apnea, cough, chest tightness, shortness of breath and wheezing.    Cardiovascular: Negative for chest pain, palpitations and leg swelling.   Gastrointestinal: Negative for abdominal  "distention, abdominal pain, constipation, diarrhea and vomiting.   Endocrine: Negative for cold intolerance, heat intolerance, polydipsia and polyuria.   Genitourinary: Negative for decreased urine volume and urgency.   Musculoskeletal: Positive for joint swelling.   Skin: Positive for rash.   Neurological: Negative for dizziness and headaches.   Hematological: Does not bruise/bleed easily.   Psychiatric/Behavioral: Negative for agitation, sleep disturbance and suicidal ideas.            Physical Exam  Vitals:    12/11/19 0952   BP: 130/70   BP Location: Left arm   Patient Position: Sitting   BP Method: Large (Manual)   Pulse: (!) 59   Temp: 98.2 °F (36.8 °C)   TempSrc: Oral   SpO2: 98%   Weight: 92.1 kg (203 lb 0.7 oz)   Height: 5' 5" (1.651 m)    Body mass index is 33.79 kg/m².  Weight: 92.1 kg (203 lb 0.7 oz)   Height: 5' 5" (165.1 cm)   Physical Exam   Constitutional: She is oriented to person, place, and time. She appears well-developed and well-nourished.   HENT:   Head: Normocephalic and atraumatic.   Eyes: Pupils are equal, round, and reactive to light. Conjunctivae and EOM are normal.   Neck: Normal range of motion.   Neurological: She is alert and oriented to person, place, and time.   Skin: Skin is warm and dry. Rash noted.   Psychiatric: She has a normal mood and affect. Her behavior is normal.   Vitals reviewed.      Health Maintenance       Date Due Completion Date    Foot Exam 06/06/2019 6/6/2018    Lipid Panel 11/15/2019 11/15/2018    TETANUS VACCINE 09/04/2020 (Originally 8/3/1958) ---    Shingles Vaccine (1 of 2) 09/04/2020 (Originally 8/3/1990) ---    Hemoglobin A1c 02/29/2020 8/29/2019    DEXA SCAN 10/30/2020 10/30/2017    Eye Exam 11/12/2020 11/12/2019    Override on 11/12/2019: Done              Patient note was created using [employer].  Any errors in syntax or even information may not have been identified and edited on initial review prior to signing this note.  "

## 2020-01-02 DIAGNOSIS — I10 HYPERTENSION, BENIGN: ICD-10-CM

## 2020-01-02 DIAGNOSIS — K21.9 GASTROESOPHAGEAL REFLUX DISEASE WITHOUT ESOPHAGITIS: ICD-10-CM

## 2020-01-02 RX ORDER — HYDROCHLOROTHIAZIDE 25 MG/1
TABLET ORAL
Qty: 90 TABLET | Refills: 0 | Status: SHIPPED | OUTPATIENT
Start: 2020-01-02 | End: 2020-03-04 | Stop reason: SDUPTHER

## 2020-01-02 RX ORDER — METOPROLOL TARTRATE 100 MG/1
TABLET ORAL
Qty: 180 TABLET | Refills: 0 | Status: SHIPPED | OUTPATIENT
Start: 2020-01-02 | End: 2020-04-02 | Stop reason: SDUPTHER

## 2020-01-06 ENCOUNTER — TELEPHONE (OUTPATIENT)
Dept: FAMILY MEDICINE | Facility: CLINIC | Age: 80
End: 2020-01-06

## 2020-01-06 NOTE — TELEPHONE ENCOUNTER
----- Message from Eric Sims sent at 1/6/2020  1:13 PM CST -----  Contact: Char [phone]  Type: Patient Call Back    Who called:Char    What is the request in detail: The patient is requesting a call back from Ms. Arreola in regards to increasing medication.    Can the clinic reply by [facility]?no    Would the patient rather a call back or a response via [facility]? Call back     Best call back number:[phone]

## 2020-01-06 NOTE — TELEPHONE ENCOUNTER
Please ask her if she is wearing her compression stockings daily.  If she does not wear them daily it will get worse. She is already on the max dosage of this medication.

## 2020-01-06 NOTE — TELEPHONE ENCOUNTER
Patient states she has 3+ pitting edema and was told by Dr White to have pcp increase HCTZ. Please advise for med change

## 2020-03-04 ENCOUNTER — LAB VISIT (OUTPATIENT)
Dept: LAB | Facility: HOSPITAL | Age: 80
End: 2020-03-04
Attending: FAMILY MEDICINE
Payer: MEDICARE

## 2020-03-04 ENCOUNTER — OFFICE VISIT (OUTPATIENT)
Dept: FAMILY MEDICINE | Facility: CLINIC | Age: 80
End: 2020-03-04
Payer: MEDICARE

## 2020-03-04 VITALS
WEIGHT: 201.75 LBS | TEMPERATURE: 98 F | DIASTOLIC BLOOD PRESSURE: 68 MMHG | HEART RATE: 63 BPM | HEIGHT: 65 IN | BODY MASS INDEX: 33.61 KG/M2 | SYSTOLIC BLOOD PRESSURE: 132 MMHG | OXYGEN SATURATION: 96 %

## 2020-03-04 DIAGNOSIS — E78.5 HYPERLIPIDEMIA, UNSPECIFIED HYPERLIPIDEMIA TYPE: ICD-10-CM

## 2020-03-04 DIAGNOSIS — I12.9 BENIGN HYPERTENSION WITH CHRONIC KIDNEY DISEASE, STAGE III: ICD-10-CM

## 2020-03-04 DIAGNOSIS — N18.30 BENIGN HYPERTENSION WITH CHRONIC KIDNEY DISEASE, STAGE III: ICD-10-CM

## 2020-03-04 DIAGNOSIS — I10 HYPERTENSION, BENIGN: ICD-10-CM

## 2020-03-04 DIAGNOSIS — K21.9 GASTROESOPHAGEAL REFLUX DISEASE WITHOUT ESOPHAGITIS: ICD-10-CM

## 2020-03-04 DIAGNOSIS — I87.2 VENOUS STASIS DERMATITIS OF BOTH LOWER EXTREMITIES: ICD-10-CM

## 2020-03-04 DIAGNOSIS — E11.9 TYPE 2 DIABETES MELLITUS WITHOUT COMPLICATION, WITHOUT LONG-TERM CURRENT USE OF INSULIN: ICD-10-CM

## 2020-03-04 DIAGNOSIS — R44.1 VISUAL HALLUCINATIONS: Primary | ICD-10-CM

## 2020-03-04 LAB
ALBUMIN SERPL BCP-MCNC: 3.9 G/DL (ref 3.5–5.2)
ALP SERPL-CCNC: 106 U/L (ref 55–135)
ALT SERPL W/O P-5'-P-CCNC: 12 U/L (ref 10–44)
ANION GAP SERPL CALC-SCNC: 11 MMOL/L (ref 8–16)
AST SERPL-CCNC: 22 U/L (ref 10–40)
BASOPHILS # BLD AUTO: 0.09 K/UL (ref 0–0.2)
BASOPHILS NFR BLD: 0.9 % (ref 0–1.9)
BILIRUB SERPL-MCNC: 0.8 MG/DL (ref 0.1–1)
BUN SERPL-MCNC: 23 MG/DL (ref 8–23)
CALCIUM SERPL-MCNC: 10.1 MG/DL (ref 8.7–10.5)
CHLORIDE SERPL-SCNC: 103 MMOL/L (ref 95–110)
CHOLEST SERPL-MCNC: 224 MG/DL (ref 120–199)
CHOLEST/HDLC SERPL: 3.2 {RATIO} (ref 2–5)
CO2 SERPL-SCNC: 28 MMOL/L (ref 23–29)
CREAT SERPL-MCNC: 1.4 MG/DL (ref 0.5–1.4)
DIFFERENTIAL METHOD: ABNORMAL
EOSINOPHIL # BLD AUTO: 0.2 K/UL (ref 0–0.5)
EOSINOPHIL NFR BLD: 1.8 % (ref 0–8)
ERYTHROCYTE [DISTWIDTH] IN BLOOD BY AUTOMATED COUNT: 15 % (ref 11.5–14.5)
EST. GFR  (AFRICAN AMERICAN): 40.6 ML/MIN/1.73 M^2
EST. GFR  (NON AFRICAN AMERICAN): 35.3 ML/MIN/1.73 M^2
ESTIMATED AVG GLUCOSE: 143 MG/DL (ref 68–131)
GLUCOSE SERPL-MCNC: 107 MG/DL (ref 70–110)
HBA1C MFR BLD HPLC: 6.6 % (ref 4–5.6)
HCT VFR BLD AUTO: 45.5 % (ref 37–48.5)
HDLC SERPL-MCNC: 69 MG/DL (ref 40–75)
HDLC SERPL: 30.8 % (ref 20–50)
HGB BLD-MCNC: 13.6 G/DL (ref 12–16)
IMM GRANULOCYTES # BLD AUTO: 0.03 K/UL (ref 0–0.04)
IMM GRANULOCYTES NFR BLD AUTO: 0.3 % (ref 0–0.5)
LDLC SERPL CALC-MCNC: 132 MG/DL (ref 63–159)
LYMPHOCYTES # BLD AUTO: 3.1 K/UL (ref 1–4.8)
LYMPHOCYTES NFR BLD: 28.9 % (ref 18–48)
MCH RBC QN AUTO: 27.1 PG (ref 27–31)
MCHC RBC AUTO-ENTMCNC: 29.9 G/DL (ref 32–36)
MCV RBC AUTO: 91 FL (ref 82–98)
MONOCYTES # BLD AUTO: 1.2 K/UL (ref 0.3–1)
MONOCYTES NFR BLD: 10.9 % (ref 4–15)
NEUTROPHILS # BLD AUTO: 6 K/UL (ref 1.8–7.7)
NEUTROPHILS NFR BLD: 57.2 % (ref 38–73)
NONHDLC SERPL-MCNC: 155 MG/DL
NRBC BLD-RTO: 0 /100 WBC
PLATELET # BLD AUTO: 311 K/UL (ref 150–350)
PMV BLD AUTO: 10.6 FL (ref 9.2–12.9)
POTASSIUM SERPL-SCNC: 5.1 MMOL/L (ref 3.5–5.1)
PROT SERPL-MCNC: 8 G/DL (ref 6–8.4)
RBC # BLD AUTO: 5.01 M/UL (ref 4–5.4)
SODIUM SERPL-SCNC: 142 MMOL/L (ref 136–145)
TRIGL SERPL-MCNC: 115 MG/DL (ref 30–150)
TSH SERPL DL<=0.005 MIU/L-ACNC: 2.18 UIU/ML (ref 0.4–4)
WBC # BLD AUTO: 10.54 K/UL (ref 3.9–12.7)

## 2020-03-04 PROCEDURE — 3078F PR MOST RECENT DIASTOLIC BLOOD PRESSURE < 80 MM HG: ICD-10-PCS | Mod: CPTII,S$GLB,, | Performed by: FAMILY MEDICINE

## 2020-03-04 PROCEDURE — 1101F PT FALLS ASSESS-DOCD LE1/YR: CPT | Mod: CPTII,S$GLB,, | Performed by: FAMILY MEDICINE

## 2020-03-04 PROCEDURE — 1101F PR PT FALLS ASSESS DOC 0-1 FALLS W/OUT INJ PAST YR: ICD-10-PCS | Mod: CPTII,S$GLB,, | Performed by: FAMILY MEDICINE

## 2020-03-04 PROCEDURE — 3078F DIAST BP <80 MM HG: CPT | Mod: CPTII,S$GLB,, | Performed by: FAMILY MEDICINE

## 2020-03-04 PROCEDURE — 99214 PR OFFICE/OUTPT VISIT, EST, LEVL IV, 30-39 MIN: ICD-10-PCS | Mod: S$GLB,,, | Performed by: FAMILY MEDICINE

## 2020-03-04 PROCEDURE — 80053 COMPREHEN METABOLIC PANEL: CPT

## 2020-03-04 PROCEDURE — 99499 UNLISTED E&M SERVICE: CPT | Mod: S$GLB,,, | Performed by: FAMILY MEDICINE

## 2020-03-04 PROCEDURE — 3075F PR MOST RECENT SYSTOLIC BLOOD PRESS GE 130-139MM HG: ICD-10-PCS | Mod: CPTII,S$GLB,, | Performed by: FAMILY MEDICINE

## 2020-03-04 PROCEDURE — 1126F AMNT PAIN NOTED NONE PRSNT: CPT | Mod: S$GLB,,, | Performed by: FAMILY MEDICINE

## 2020-03-04 PROCEDURE — 99214 OFFICE O/P EST MOD 30 MIN: CPT | Mod: S$GLB,,, | Performed by: FAMILY MEDICINE

## 2020-03-04 PROCEDURE — 3075F SYST BP GE 130 - 139MM HG: CPT | Mod: CPTII,S$GLB,, | Performed by: FAMILY MEDICINE

## 2020-03-04 PROCEDURE — 83036 HEMOGLOBIN GLYCOSYLATED A1C: CPT

## 2020-03-04 PROCEDURE — 1126F PR PAIN SEVERITY QUANTIFIED, NO PAIN PRESENT: ICD-10-PCS | Mod: S$GLB,,, | Performed by: FAMILY MEDICINE

## 2020-03-04 PROCEDURE — 80061 LIPID PANEL: CPT

## 2020-03-04 PROCEDURE — 85025 COMPLETE CBC W/AUTO DIFF WBC: CPT

## 2020-03-04 PROCEDURE — 36415 COLL VENOUS BLD VENIPUNCTURE: CPT | Mod: PO

## 2020-03-04 PROCEDURE — 84443 ASSAY THYROID STIM HORMONE: CPT

## 2020-03-04 PROCEDURE — 99499 RISK ADDL DX/OHS AUDIT: ICD-10-PCS | Mod: S$GLB,,, | Performed by: FAMILY MEDICINE

## 2020-03-04 PROCEDURE — 1159F MED LIST DOCD IN RCRD: CPT | Mod: S$GLB,,, | Performed by: FAMILY MEDICINE

## 2020-03-04 PROCEDURE — 1159F PR MEDICATION LIST DOCUMENTED IN MEDICAL RECORD: ICD-10-PCS | Mod: S$GLB,,, | Performed by: FAMILY MEDICINE

## 2020-03-04 PROCEDURE — 99999 PR PBB SHADOW E&M-EST. PATIENT-LVL III: CPT | Mod: PBBFAC,,, | Performed by: FAMILY MEDICINE

## 2020-03-04 PROCEDURE — 99999 PR PBB SHADOW E&M-EST. PATIENT-LVL III: ICD-10-PCS | Mod: PBBFAC,,, | Performed by: FAMILY MEDICINE

## 2020-03-04 RX ORDER — IRBESARTAN 75 MG/1
75 TABLET ORAL NIGHTLY
Qty: 90 TABLET | Refills: 3 | Status: SHIPPED | OUTPATIENT
Start: 2020-03-04 | End: 2020-06-01

## 2020-03-04 RX ORDER — OMEPRAZOLE 20 MG/1
20 CAPSULE, DELAYED RELEASE ORAL DAILY
Qty: 90 CAPSULE | Refills: 3 | Status: SHIPPED | OUTPATIENT
Start: 2020-03-04 | End: 2021-03-02

## 2020-03-04 RX ORDER — HYDROCHLOROTHIAZIDE 25 MG/1
25 TABLET ORAL DAILY
Qty: 90 TABLET | Refills: 3 | Status: SHIPPED | OUTPATIENT
Start: 2020-03-04 | End: 2021-03-02

## 2020-03-04 NOTE — PROGRESS NOTES
Assessment & Plan  Problem List Items Addressed This Visit        Cardiac/Vascular    Hyperlipidemia    Overview     Cannot tolerate pravastatin nor crestor.  Will focus on diet changes to improve her control.          Current Assessment & Plan     Pt is currently stable on medication regimen.  Continue current therapy as scheduled.  Contact office with any questions about adjustments on medications.            Hypertension, benign    Overview     Failed amlodipine  Stopped valsartan due to recall         Relevant Medications    irbesartan (AVAPRO) 75 MG tablet    hydroCHLOROthiazide (HYDRODIURIL) 25 MG tablet    Venous stasis dermatitis of both lower extremities       Renal/    Benign hypertension with chronic kidney disease, stage III    Overview     Failed irbesartan secondary to hyperkalemia            Other Visit Diagnoses     Visual hallucinations    -  Primary    Gastroesophageal reflux disease without esophagitis        Relevant Medications    omeprazole (PRILOSEC) 20 MG capsule      patient does have visual hallucinations.  I believe that the patient is in partial REM sleep with the hallucinations occur.  Once she is fully awake the hallucinations resolve.    Will stop ranitidine and valsartan as this is causing increased stress.      Compression stockings:  Needs to wear these on a daily basis. Consider addition of lasix for a temporary relief of lower extremity swelling.        Health Maintenance reviewed    Follow-up: Follow up in about 6 months (around 9/4/2020).    ______________________________________________________________________    Chief Complaint  Chief Complaint   Patient presents with    Follow-up       HPI  Char Savage is a 79 y.o. female with multiple medical diagnoses as listed in the medical history and problem list that presents for hypertension and hallucinations.  Pt is known to me with last appointment 12/11/2019.    She is having visual hallucinations shortly after she wakes  up from sleeping.  This can occur in the middle of the night.   The in his stasis dermatitis:  Patient has obtained compression stockings.  We discussed use of the compression stockings.  Patient is currently on hydrochlorothiazide.  This address is her blood pressure as well as her lower extremity swelling. She is on the max dosage of this medication.  I did notify this patient that she is on the max dosage.  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    Nevus of choroid     od    Type 2 diabetes mellitus without complication 10/17/2017       PAST SURGICAL HISTORY:  Past Surgical History:   Procedure Laterality Date    APPENDECTOMY      BREAST BIOPSY Bilateral     CATARACT EXTRACTION  1993/2000    od/os    COLONOSCOPY      HYSTERECTOMY      ORBITAL FRACTURE SURGERY  12/98    os dr coleman    PARTIAL HYSTERECTOMY      RIB FRACTURE SURGERY      TONSILLECTOMY, ADENOIDECTOMY         SOCIAL HISTORY:  Social History     Socioeconomic History    Marital status:      Spouse name: Not on file    Number of children: Not on file    Years of education: Not on file    Highest education level: Not on file   Occupational History    Not on file   Social Needs    Financial resource strain: Not on file    Food insecurity:     Worry: Not on file     Inability: Not on file    Transportation needs:     Medical: Not on file     Non-medical: Not on file   Tobacco Use    Smoking status: Never Smoker    Smokeless tobacco: Never Used   Substance and Sexual Activity    Alcohol use: No     Alcohol/week: 0.0 standard drinks    Drug use: No    Sexual activity: Not on file   Lifestyle    Physical activity:     Days per week: Not on file     Minutes  per session: Not on file    Stress: Not on file   Relationships    Social connections:     Talks on phone: Not on file     Gets together: Not on file     Attends [employer] service: Not on file     Active member of club or organization: Not on file     Attends meetings of clubs or organizations: Not on file     Relationship status: Not on file   Other Topics Concern    Not on file   Social History Narrative    Not on file       FAMILY HISTORY:  Family History   Problem Relation Age of Onset    Cancer Mother     Breast cancer Mother     Cataracts Father     Hypertension Father     No Known Problems Sister     No Known Problems Brother     No Known Problems Maternal Aunt     No Known Problems Maternal Uncle     No Known Problems Paternal Aunt     No Known Problems Paternal Uncle     No Known Problems Maternal Grandmother     No Known Problems Maternal Grandfather     No Known Problems Paternal Grandmother     No Known Problems Paternal Grandfather     Amblyopia Neg Hx     Blindness Neg Hx     Diabetes Neg Hx     Glaucoma Neg Hx     Macular degeneration Neg Hx     Retinal detachment Neg Hx     Strabismus Neg Hx     Stroke Neg Hx     Thyroid disease Neg Hx        ALLERGIES AND MEDICATIONS: updated and reviewed.  Review of patient's allergies indicates:   Allergen Reactions    Amoxicillin Other (See Comments)    Bactrim [sulfamethoxazole-trimethoprim] Other (See Comments)    Darvon [propoxyphene] Other (See Comments)    Statins-hmg-coa reductase inhibitors      Flu symptoms    Toradol [ketorolac] Other (See Comments)    Vibramycin [doxycycline calcium] Rash     Current Outpatient Medications   Medication Sig Dispense Refill    aspirin (ECOTRIN) 81 MG EC tablet Take 81 mg by mouth once daily.      hydroCHLOROthiazide (HYDRODIURIL) 25 MG tablet Take 1 tablet (25 mg total) by mouth once daily. 90 tablet 3    KETOPROFEN, MICRONIZED TOP APPLY UP TO 3.2 GRAMS (2 PUMPS) TO PAINFUL AREAS UP TO  "FIVE TIMES DAILY. RUB IN WELL  3    metoprolol tartrate (LOPRESSOR) 100 MG tablet TAKE ONE TABLET BY MOUTH TWICE DAILY 180 tablet 0    mometasone 0.1% (ELOCON) 0.1 % cream Apply 1 application topically 2 (two) times daily.      multivitamin (ONE DAILY MULTIVITAMIN) per tablet Take 1 tablet by mouth once daily.      irbesartan (AVAPRO) 75 MG tablet Take 1 tablet (75 mg total) by mouth every evening. 90 tablet 3    omeprazole (PRILOSEC) 20 MG capsule Take 1 capsule (20 mg total) by mouth once daily. 90 capsule 3     No current facility-administered medications for this visit.          ROS  Review of Systems   Constitutional: Negative for activity change, appetite change, fatigue, fever and unexpected weight change.   HENT: Negative.  Negative for ear discharge, ear pain, rhinorrhea and sore throat.    Eyes: Negative.    Respiratory: Negative for apnea, cough, chest tightness, shortness of breath and wheezing.    Cardiovascular: Negative for chest pain, palpitations and leg swelling.   Gastrointestinal: Negative for abdominal distention, abdominal pain, constipation, diarrhea and vomiting.   Endocrine: Negative for cold intolerance, heat intolerance, polydipsia and polyuria.   Genitourinary: Negative for decreased urine volume and urgency.   Musculoskeletal: Positive for gait problem and joint swelling.   Skin: Negative for rash.   Neurological: Negative for dizziness and headaches.   Hematological: Does not bruise/bleed easily.   Psychiatric/Behavioral: Negative for agitation, sleep disturbance and suicidal ideas.         Physical Exam  Vitals:    03/04/20 0956   BP: 132/68   BP Location: Left arm   Patient Position: Sitting   BP Method: Large (Manual)   Pulse: 63   Temp: 98.2 °F (36.8 °C)   TempSrc: Oral   SpO2: 96%   Weight: 91.5 kg (201 lb 11.5 oz)   Height: 5' 5" (1.651 m)    Body mass index is 33.57 kg/m².  Weight: 91.5 kg (201 lb 11.5 oz)   Height: 5' 5" (165.1 cm)   Physical Exam   Constitutional: She is " oriented to person, place, and time. She appears well-developed and well-nourished.   HENT:   Head: Normocephalic and atraumatic.   Right Ear: External ear normal.   Left Ear: External ear normal.   Nose: Nose normal.   Mouth/Throat: Oropharynx is clear and moist.   Eyes: Pupils are equal, round, and reactive to light. Conjunctivae and EOM are normal.   Cardiovascular: Normal rate, regular rhythm and normal heart sounds.   Pulmonary/Chest: Effort normal and breath sounds normal.   Neurological: She is alert and oriented to person, place, and time.   Skin: Skin is warm and dry.        Vitals reviewed.        Health Maintenance       Date Due Completion Date    Foot Exam 06/06/2019 6/6/2018    Lipid Panel 11/15/2019 11/15/2018    Hemoglobin A1c 02/29/2020 8/29/2019    TETANUS VACCINE 09/04/2020 (Originally 8/3/1958) ---    Shingles Vaccine (1 of 2) 09/04/2020 (Originally 8/3/1988) ---    DEXA SCAN 10/30/2020 10/30/2017    Eye Exam 11/12/2020 11/12/2019    Override on 11/12/2019: Done              Patient note was created using [employer].  Any errors in syntax or even information may not have been identified and edited on initial review prior to signing this note.

## 2020-04-02 DIAGNOSIS — I10 HYPERTENSION, BENIGN: ICD-10-CM

## 2020-04-02 RX ORDER — METOPROLOL TARTRATE 100 MG/1
100 TABLET ORAL 2 TIMES DAILY
Qty: 180 TABLET | Refills: 0 | Status: SHIPPED | OUTPATIENT
Start: 2020-04-02 | End: 2020-06-29 | Stop reason: SDUPTHER

## 2020-04-02 NOTE — TELEPHONE ENCOUNTER
Last Office Visit Info:   The patient's last visit with Di Perez MD was on 3/4/2020.    The patient's last visit in current department was on Visit date not found.        Last CBC Results:   Lab Results   Component Value Date    WBC 10.54 03/04/2020    HGB 13.6 03/04/2020    HCT 45.5 03/04/2020     03/04/2020       Last CMP Results  Lab Results   Component Value Date     03/04/2020    K 5.1 03/04/2020     03/04/2020    CO2 28 03/04/2020    BUN 23 03/04/2020    CREATININE 1.4 03/04/2020    CALCIUM 10.1 03/04/2020    ALBUMIN 3.9 03/04/2020    AST 22 03/04/2020    ALT 12 03/04/2020       Last Lipids  Lab Results   Component Value Date    CHOL 224 (H) 03/04/2020    TRIG 115 03/04/2020    HDL 69 03/04/2020    LDLCALC 132.0 03/04/2020       Last A1C  Lab Results   Component Value Date    HGBA1C 6.6 (H) 03/04/2020       Last TSH  Lab Results   Component Value Date    TSH 2.183 03/04/2020         Current Med Refills  Medication List with Changes/Refills   Current Medications    ASPIRIN (ECOTRIN) 81 MG EC TABLET    Take 81 mg by mouth once daily.       Start Date: --        End Date: --    HYDROCHLOROTHIAZIDE (HYDRODIURIL) 25 MG TABLET    Take 1 tablet (25 mg total) by mouth once daily.       Start Date: 3/4/2020  End Date: 6/2/2020    IRBESARTAN (AVAPRO) 75 MG TABLET    Take 1 tablet (75 mg total) by mouth every evening.       Start Date: 3/4/2020  End Date: 3/4/2021    KETOPROFEN, MICRONIZED TOP    APPLY UP TO 3.2 GRAMS (2 PUMPS) TO PAINFUL AREAS UP TO FIVE TIMES DAILY. RUB IN WELL       Start Date: 11/1/2019 End Date: --    METOPROLOL TARTRATE (LOPRESSOR) 100 MG TABLET    TAKE ONE TABLET BY MOUTH TWICE DAILY       Start Date: 1/2/2020  End Date: --    MOMETASONE 0.1% (ELOCON) 0.1 % CREAM    Apply 1 application topically 2 (two) times daily.       Start Date: --        End Date: --    MULTIVITAMIN (ONE DAILY MULTIVITAMIN) PER TABLET    Take 1 tablet by mouth once daily.       Start Date: --         End Date: --    OMEPRAZOLE (PRILOSEC) 20 MG CAPSULE    Take 1 capsule (20 mg total) by mouth once daily.       Start Date: 3/4/2020  End Date: 3/4/2021       Order(s) placed per written order guidelines:     Please advise.

## 2020-06-01 ENCOUNTER — TELEPHONE (OUTPATIENT)
Dept: FAMILY MEDICINE | Facility: CLINIC | Age: 80
End: 2020-06-01

## 2020-06-01 RX ORDER — CANDESARTAN 16 MG/1
16 TABLET ORAL DAILY
Qty: 90 TABLET | Refills: 3 | Status: SHIPPED | OUTPATIENT
Start: 2020-06-01 | End: 2020-09-04

## 2020-06-25 ENCOUNTER — TELEPHONE (OUTPATIENT)
Dept: FAMILY MEDICINE | Facility: CLINIC | Age: 80
End: 2020-06-25

## 2020-06-25 DIAGNOSIS — Z12.31 ENCOUNTER FOR SCREENING MAMMOGRAM FOR BREAST CANCER: Primary | ICD-10-CM

## 2020-06-25 NOTE — TELEPHONE ENCOUNTER
----- Message from Elise Cavazos sent at 6/25/2020  3:20 PM CDT -----  Regarding: mammo orders  Type: Patient Call Back    Who called:pt    What is the request in detail:pt requesting mammo orders. Call pt    Can the clinic reply by [facility]?    Would the patient rather a call back or a response via [facility]? call    Best call back number:[phone] (home)       Additional Information:

## 2020-06-29 DIAGNOSIS — I10 HYPERTENSION, BENIGN: ICD-10-CM

## 2020-06-29 RX ORDER — METOPROLOL TARTRATE 100 MG/1
100 TABLET ORAL 2 TIMES DAILY
Qty: 180 TABLET | Refills: 0 | Status: SHIPPED | OUTPATIENT
Start: 2020-06-29 | End: 2020-10-01

## 2020-07-14 ENCOUNTER — HOSPITAL ENCOUNTER (OUTPATIENT)
Dept: RADIOLOGY | Facility: HOSPITAL | Age: 80
Discharge: HOME | End: 2020-07-14
Attending: FAMILY MEDICINE
Payer: MEDICARE

## 2020-07-14 DIAGNOSIS — Z12.31 ENCOUNTER FOR SCREENING MAMMOGRAM FOR BREAST CANCER: ICD-10-CM

## 2020-07-14 PROCEDURE — 77063 BREAST TOMOSYNTHESIS BI: CPT | Mod: 26,,, | Performed by: RADIOLOGY

## 2020-07-14 PROCEDURE — 77067 SCR MAMMO BI INCL CAD: CPT | Mod: 26,,, | Performed by: RADIOLOGY

## 2020-07-14 PROCEDURE — 77063 MAMMO DIGITAL SCREENING BILAT WITH TOMOSYNTHESIS_CAD: ICD-10-PCS | Mod: 26,,, | Performed by: RADIOLOGY

## 2020-07-14 PROCEDURE — 77067 SCR MAMMO BI INCL CAD: CPT | Mod: TC

## 2020-07-14 PROCEDURE — 77067 MAMMO DIGITAL SCREENING BILAT WITH TOMOSYNTHESIS_CAD: ICD-10-PCS | Mod: 26,,, | Performed by: RADIOLOGY

## 2020-09-04 ENCOUNTER — OFFICE VISIT (OUTPATIENT)
Dept: FAMILY MEDICINE | Facility: CLINIC | Age: 80
End: 2020-09-04
Payer: MEDICARE

## 2020-09-04 ENCOUNTER — LAB VISIT (OUTPATIENT)
Dept: LAB | Facility: HOSPITAL | Age: 80
End: 2020-09-04
Attending: FAMILY MEDICINE
Payer: MEDICARE

## 2020-09-04 VITALS
WEIGHT: 202.63 LBS | HEIGHT: 65 IN | HEART RATE: 63 BPM | SYSTOLIC BLOOD PRESSURE: 120 MMHG | BODY MASS INDEX: 33.76 KG/M2 | OXYGEN SATURATION: 98 % | TEMPERATURE: 99 F | DIASTOLIC BLOOD PRESSURE: 70 MMHG

## 2020-09-04 DIAGNOSIS — N18.30 TYPE 2 DIABETES MELLITUS WITH STAGE 3 CHRONIC KIDNEY DISEASE, WITHOUT LONG-TERM CURRENT USE OF INSULIN: ICD-10-CM

## 2020-09-04 DIAGNOSIS — E78.5 HYPERLIPIDEMIA, UNSPECIFIED HYPERLIPIDEMIA TYPE: ICD-10-CM

## 2020-09-04 DIAGNOSIS — E66.01 CLASS 2 SEVERE OBESITY DUE TO EXCESS CALORIES WITH SERIOUS COMORBIDITY AND BODY MASS INDEX (BMI) OF 35.0 TO 35.9 IN ADULT: ICD-10-CM

## 2020-09-04 DIAGNOSIS — E11.22 TYPE 2 DIABETES MELLITUS WITH STAGE 3 CHRONIC KIDNEY DISEASE, WITHOUT LONG-TERM CURRENT USE OF INSULIN: ICD-10-CM

## 2020-09-04 DIAGNOSIS — I10 HYPERTENSION, BENIGN: ICD-10-CM

## 2020-09-04 DIAGNOSIS — R44.1 VISUAL HALLUCINATIONS: ICD-10-CM

## 2020-09-04 DIAGNOSIS — M79.89 LOCALIZED SWELLING OF LOWER EXTREMITY: ICD-10-CM

## 2020-09-04 DIAGNOSIS — I10 HYPERTENSION, BENIGN: Primary | ICD-10-CM

## 2020-09-04 LAB
ALBUMIN SERPL BCP-MCNC: 3.8 G/DL (ref 3.5–5.2)
ALP SERPL-CCNC: 97 U/L (ref 55–135)
ALT SERPL W/O P-5'-P-CCNC: 12 U/L (ref 10–44)
ANION GAP SERPL CALC-SCNC: 10 MMOL/L (ref 8–16)
AST SERPL-CCNC: 22 U/L (ref 10–40)
BASOPHILS # BLD AUTO: 0.08 K/UL (ref 0–0.2)
BASOPHILS NFR BLD: 0.9 % (ref 0–1.9)
BILIRUB SERPL-MCNC: 0.6 MG/DL (ref 0.1–1)
BUN SERPL-MCNC: 28 MG/DL (ref 8–23)
CALCIUM SERPL-MCNC: 10 MG/DL (ref 8.7–10.5)
CHLORIDE SERPL-SCNC: 104 MMOL/L (ref 95–110)
CHOLEST SERPL-MCNC: 212 MG/DL (ref 120–199)
CHOLEST/HDLC SERPL: 3.5 {RATIO} (ref 2–5)
CO2 SERPL-SCNC: 25 MMOL/L (ref 23–29)
CREAT SERPL-MCNC: 1.6 MG/DL (ref 0.5–1.4)
DIFFERENTIAL METHOD: ABNORMAL
EOSINOPHIL # BLD AUTO: 0.3 K/UL (ref 0–0.5)
EOSINOPHIL NFR BLD: 3 % (ref 0–8)
ERYTHROCYTE [DISTWIDTH] IN BLOOD BY AUTOMATED COUNT: 14.6 % (ref 11.5–14.5)
EST. GFR  (AFRICAN AMERICAN): 34.3 ML/MIN/1.73 M^2
EST. GFR  (NON AFRICAN AMERICAN): 29.8 ML/MIN/1.73 M^2
GLUCOSE SERPL-MCNC: 120 MG/DL (ref 70–110)
HCT VFR BLD AUTO: 44 % (ref 37–48.5)
HDLC SERPL-MCNC: 61 MG/DL (ref 40–75)
HDLC SERPL: 28.8 % (ref 20–50)
HGB BLD-MCNC: 13.3 G/DL (ref 12–16)
IMM GRANULOCYTES # BLD AUTO: 0.02 K/UL (ref 0–0.04)
IMM GRANULOCYTES NFR BLD AUTO: 0.2 % (ref 0–0.5)
LDLC SERPL CALC-MCNC: 128.8 MG/DL (ref 63–159)
LYMPHOCYTES # BLD AUTO: 3 K/UL (ref 1–4.8)
LYMPHOCYTES NFR BLD: 33.8 % (ref 18–48)
MCH RBC QN AUTO: 27.9 PG (ref 27–31)
MCHC RBC AUTO-ENTMCNC: 30.2 G/DL (ref 32–36)
MCV RBC AUTO: 92 FL (ref 82–98)
MONOCYTES # BLD AUTO: 1 K/UL (ref 0.3–1)
MONOCYTES NFR BLD: 10.8 % (ref 4–15)
NEUTROPHILS # BLD AUTO: 4.5 K/UL (ref 1.8–7.7)
NEUTROPHILS NFR BLD: 51.3 % (ref 38–73)
NONHDLC SERPL-MCNC: 151 MG/DL
NRBC BLD-RTO: 0 /100 WBC
PLATELET # BLD AUTO: 276 K/UL (ref 150–350)
PMV BLD AUTO: 10.7 FL (ref 9.2–12.9)
POTASSIUM SERPL-SCNC: 5.1 MMOL/L (ref 3.5–5.1)
PROT SERPL-MCNC: 7.8 G/DL (ref 6–8.4)
RBC # BLD AUTO: 4.76 M/UL (ref 4–5.4)
SODIUM SERPL-SCNC: 139 MMOL/L (ref 136–145)
TRIGL SERPL-MCNC: 111 MG/DL (ref 30–150)
TSH SERPL DL<=0.005 MIU/L-ACNC: 2.94 UIU/ML (ref 0.4–4)
WBC # BLD AUTO: 8.78 K/UL (ref 3.9–12.7)

## 2020-09-04 PROCEDURE — 1101F PR PT FALLS ASSESS DOC 0-1 FALLS W/OUT INJ PAST YR: ICD-10-PCS | Mod: CPTII,S$GLB,, | Performed by: FAMILY MEDICINE

## 2020-09-04 PROCEDURE — 1125F PR PAIN SEVERITY QUANTIFIED, PAIN PRESENT: ICD-10-PCS | Mod: S$GLB,,, | Performed by: FAMILY MEDICINE

## 2020-09-04 PROCEDURE — 1159F MED LIST DOCD IN RCRD: CPT | Mod: S$GLB,,, | Performed by: FAMILY MEDICINE

## 2020-09-04 PROCEDURE — 3078F PR MOST RECENT DIASTOLIC BLOOD PRESSURE < 80 MM HG: ICD-10-PCS | Mod: CPTII,S$GLB,, | Performed by: FAMILY MEDICINE

## 2020-09-04 PROCEDURE — 99215 OFFICE O/P EST HI 40 MIN: CPT | Mod: S$GLB,,, | Performed by: FAMILY MEDICINE

## 2020-09-04 PROCEDURE — 99999 PR PBB SHADOW E&M-EST. PATIENT-LVL IV: CPT | Mod: PBBFAC,,, | Performed by: FAMILY MEDICINE

## 2020-09-04 PROCEDURE — 99999 PR PBB SHADOW E&M-EST. PATIENT-LVL IV: ICD-10-PCS | Mod: PBBFAC,,, | Performed by: FAMILY MEDICINE

## 2020-09-04 PROCEDURE — 1101F PT FALLS ASSESS-DOCD LE1/YR: CPT | Mod: CPTII,S$GLB,, | Performed by: FAMILY MEDICINE

## 2020-09-04 PROCEDURE — 3078F DIAST BP <80 MM HG: CPT | Mod: CPTII,S$GLB,, | Performed by: FAMILY MEDICINE

## 2020-09-04 PROCEDURE — 80053 COMPREHEN METABOLIC PANEL: CPT

## 2020-09-04 PROCEDURE — 36415 COLL VENOUS BLD VENIPUNCTURE: CPT | Mod: PO

## 2020-09-04 PROCEDURE — 85025 COMPLETE CBC W/AUTO DIFF WBC: CPT

## 2020-09-04 PROCEDURE — 3074F PR MOST RECENT SYSTOLIC BLOOD PRESSURE < 130 MM HG: ICD-10-PCS | Mod: CPTII,S$GLB,, | Performed by: FAMILY MEDICINE

## 2020-09-04 PROCEDURE — 1159F PR MEDICATION LIST DOCUMENTED IN MEDICAL RECORD: ICD-10-PCS | Mod: S$GLB,,, | Performed by: FAMILY MEDICINE

## 2020-09-04 PROCEDURE — 84443 ASSAY THYROID STIM HORMONE: CPT

## 2020-09-04 PROCEDURE — 99215 PR OFFICE/OUTPT VISIT, EST, LEVL V, 40-54 MIN: ICD-10-PCS | Mod: S$GLB,,, | Performed by: FAMILY MEDICINE

## 2020-09-04 PROCEDURE — 3074F SYST BP LT 130 MM HG: CPT | Mod: CPTII,S$GLB,, | Performed by: FAMILY MEDICINE

## 2020-09-04 PROCEDURE — 80061 LIPID PANEL: CPT

## 2020-09-04 PROCEDURE — 83036 HEMOGLOBIN GLYCOSYLATED A1C: CPT

## 2020-09-04 PROCEDURE — 1125F AMNT PAIN NOTED PAIN PRSNT: CPT | Mod: S$GLB,,, | Performed by: FAMILY MEDICINE

## 2020-09-04 PROCEDURE — 99499 RISK ADDL DX/OHS AUDIT: ICD-10-PCS | Mod: S$GLB,,, | Performed by: FAMILY MEDICINE

## 2020-09-04 PROCEDURE — 99499 UNLISTED E&M SERVICE: CPT | Mod: S$GLB,,, | Performed by: FAMILY MEDICINE

## 2020-09-04 RX ORDER — FUROSEMIDE 20 MG/1
20 TABLET ORAL DAILY PRN
Qty: 30 TABLET | Refills: 1 | Status: SHIPPED | OUTPATIENT
Start: 2020-09-04 | End: 2020-09-28

## 2020-09-04 RX ORDER — CANDESARTAN 16 MG/1
16 TABLET ORAL DAILY
COMMUNITY
End: 2020-10-07 | Stop reason: SDUPTHER

## 2020-09-04 NOTE — ASSESSMENT & PLAN NOTE
Pt is currently stable on medication regimen.  Continue current therapy as scheduled.  Contact office with any questions about adjustments on medications.   Hold candesartan

## 2020-09-04 NOTE — PROGRESS NOTES
Assessment & Plan  Problem List Items Addressed This Visit        Cardiac/Vascular    Hyperlipidemia    Overview     Cannot tolerate pravastatin nor crestor.  Will focus on diet changes to improve her control.          Current Assessment & Plan     Pt is currently stable on medication regimen.  Continue current therapy as scheduled.  Contact office with any questions about adjustments on medications.            Hypertension, benign - Primary    Overview     Failed amlodipine  Stopped valsartan due to recall         Current Assessment & Plan     Pt is currently stable on medication regimen.  Continue current therapy as scheduled.  Contact office with any questions about adjustments on medications.   Hold candesartan             Endocrine    Type 2 diabetes mellitus with stage 3 chronic kidney disease, without long-term current use of insulin    Overview     Cannot tolerate metformin          Current Assessment & Plan     Pt is currently stable on medication regimen.  Continue current therapy as scheduled.  Contact office with any questions about adjustments on medications.               Other    Class 2 severe obesity due to excess calories with serious comorbidity and body mass index (BMI) of 35.0 to 35.9 in adult      Other Visit Diagnoses     Visual hallucinations        Relevant Orders    Ambulatory referral/consult to Neurology    Localized swelling of lower extremity        Relevant Medications    furosemide (LASIX) 20 MG tablet      increased weakness.  Will hold candesartan for one week.  Will call with BP reading.     Greater than 45 minutes was spent with this patient with greater than 50% spent with face-to-face counseling on above listed conditions.        Health Maintenance reviewed.    Follow-up: No follow-ups on file.    ______________________________________________________________________    Chief Complaint  Chief Complaint   Patient presents with    Follow-up       HPI  Char Savage is a 80  y.o. female with multiple medical diagnoses as listed in the medical history and problem list that presents for fall asleep.  Pt is known to me with last appointment 3/4/2020.    Patient denies any new symptoms including chest pain, SOB, blurry vision, N/V, diarrhea.  She continues to have visual hallucinations. Her  states that it is getting worse.  She is using caution with her prilosec.  Concerned for side effects.    She does report increased improvement of pain with the topical medication.     Diabetes: The patient reports that they  check blood sugars at home on a regular basis.  Blood sugar results are generally in an acceptable range (> 70 and <150). The patient  denies any problems with low blood sugars. The patient  reports that they have been complaint with current treatment plan (diet, exercise, medication).  Hypertension: The patient reports that they check their blood pressures regularly and blood pressure is generally well controlled (<= 139/89).  The patient  is not enrolled in the digital hypertension program. The patient denies  cardiac chest pain, shortness of breath, lower extremity edema, headaches and side effects of medication. The patient reports problems with  none. The patient  has been compliant with the current medication regimen.  The patient : tries to follow a low salt diet.    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    Nevus of choroid     od    Type 2 diabetes mellitus without complication 10/17/2017       PAST SURGICAL HISTORY:  Past Surgical History:   Procedure Laterality Date    APPENDECTOMY      BREAST BIOPSY Bilateral     CATARACT EXTRACTION  1993/2000    od/os    COLONOSCOPY      HYSTERECTOMY      ORBITAL FRACTURE SURGERY  12/98    os dr coleman    PARTIAL HYSTERECTOMY      RIB FRACTURE SURGERY      TONSILLECTOMY, ADENOIDECTOMY         SOCIAL  HISTORY:  Social History     Socioeconomic History    Marital status:      Spouse name: Not on file    Number of children: Not on file    Years of education: Not on file    Highest education level: Not on file   Occupational History    Not on file   Social Needs    Financial resource strain: Not on file    Food insecurity     Worry: Not on file     Inability: Not on file    Transportation needs     Medical: Not on file     Non-medical: Not on file   Tobacco Use    Smoking status: Never Smoker    Smokeless tobacco: Never Used   Substance and Sexual Activity    Alcohol use: No     Alcohol/week: 0.0 standard drinks    Drug use: No    Sexual activity: Not on file   Lifestyle    Physical activity     Days per week: Not on file     Minutes per session: Not on file    Stress: Not on file   Relationships    Social connections     Talks on phone: Not on file     Gets together: Not on file     Attends [employer] service: Not on file     Active member of club or organization: Not on file     Attends meetings of clubs or organizations: Not on file     Relationship status: Not on file   Other Topics Concern    Not on file   Social History Narrative    Not on file       FAMILY HISTORY:  Family History   Problem Relation Age of Onset    Cancer Mother     Breast cancer Mother     Cataracts Father     Hypertension Father     No Known Problems Sister     No Known Problems Brother     No Known Problems Maternal Aunt     No Known Problems Maternal Uncle     No Known Problems Paternal Aunt     No Known Problems Paternal Uncle     No Known Problems Maternal Grandmother     No Known Problems Maternal Grandfather     No Known Problems Paternal Grandmother     No Known Problems Paternal Grandfather     Amblyopia Neg Hx     Blindness Neg Hx     Diabetes Neg Hx     Glaucoma Neg Hx     Macular degeneration Neg Hx     Retinal detachment Neg Hx     Strabismus Neg Hx     Stroke Neg Hx     Thyroid  disease Neg Hx        ALLERGIES AND MEDICATIONS: updated and reviewed.  Review of patient's allergies indicates:   Allergen Reactions    Amoxicillin Other (See Comments)    Bactrim [sulfamethoxazole-trimethoprim] Other (See Comments)    Darvon [propoxyphene] Other (See Comments)    Statins-hmg-coa reductase inhibitors      Flu symptoms    Toradol [ketorolac] Other (See Comments)    Vibramycin [doxycycline calcium] Rash     Current Outpatient Medications   Medication Sig Dispense Refill    aspirin (ECOTRIN) 81 MG EC tablet Take 81 mg by mouth once daily.      candesartan (ATACAND) 16 MG tablet Take 16 mg by mouth once daily.      hydroCHLOROthiazide (HYDRODIURIL) 25 MG tablet Take 1 tablet (25 mg total) by mouth once daily. 90 tablet 3    KETOPROFEN, MICRONIZED TOP APPLY UP TO 3.2 GRAMS (2 PUMPS) TO PAINFUL AREAS UP TO FIVE TIMES DAILY. RUB IN WELL  3    metoprolol tartrate (LOPRESSOR) 100 MG tablet Take 1 tablet (100 mg total) by mouth 2 (two) times daily. 180 tablet 0    mometasone 0.1% (ELOCON) 0.1 % cream Apply 1 application topically 2 (two) times daily.      multivitamin (ONE DAILY MULTIVITAMIN) per tablet Take 1 tablet by mouth once daily.      omeprazole (PRILOSEC) 20 MG capsule Take 1 capsule (20 mg total) by mouth once daily. 90 capsule 3    furosemide (LASIX) 20 MG tablet Take 1 tablet (20 mg total) by mouth daily as needed. 30 tablet 1     No current facility-administered medications for this visit.          ROS  Review of Systems   Constitutional: Positive for fatigue. Negative for activity change, appetite change, fever and unexpected weight change.   HENT: Negative.  Negative for ear discharge, ear pain, rhinorrhea and sore throat.    Eyes: Negative.    Respiratory: Negative for apnea, cough, chest tightness, shortness of breath and wheezing.    Cardiovascular: Negative for chest pain, palpitations and leg swelling.   Gastrointestinal: Negative for abdominal distention, abdominal pain,  "constipation, diarrhea and vomiting.   Endocrine: Negative for cold intolerance, heat intolerance, polydipsia and polyuria.   Genitourinary: Negative for decreased urine volume and urgency.   Musculoskeletal: Negative.    Skin: Negative for rash.   Neurological: Positive for weakness. Negative for dizziness and headaches.   Hematological: Does not bruise/bleed easily.   Psychiatric/Behavioral: Positive for hallucinations. Negative for agitation, sleep disturbance and suicidal ideas.           Physical Exam  Vitals:    09/04/20 1026   BP: 120/70   BP Location: Left arm   Patient Position: Sitting   BP Method: Large (Manual)   Pulse: 63   Temp: 98.8 °F (37.1 °C)   TempSrc: Oral   SpO2: 98%   Weight: 91.9 kg (202 lb 9.6 oz)   Height: 5' 5" (1.651 m)    Body mass index is 33.71 kg/m².  Weight: 91.9 kg (202 lb 9.6 oz)   Height: 5' 5" (165.1 cm)   Physical Exam  Vitals signs reviewed.   Constitutional:       Appearance: She is well-developed.   HENT:      Head: Normocephalic and atraumatic.      Right Ear: External ear normal.      Left Ear: External ear normal.      Nose: Nose normal.   Eyes:      Conjunctiva/sclera: Conjunctivae normal.      Pupils: Pupils are equal, round, and reactive to light.   Cardiovascular:      Rate and Rhythm: Normal rate and regular rhythm.      Heart sounds: Normal heart sounds.   Pulmonary:      Effort: Pulmonary effort is normal.      Breath sounds: Normal breath sounds.   Skin:     General: Skin is warm and dry.   Neurological:      General: No focal deficit present.      Mental Status: She is alert. Mental status is at baseline.           Health Maintenance       Date Due Completion Date    Foot Exam 06/06/2019 6/6/2018    Colonoscopy 04/17/2020 4/17/2013    Influenza Vaccine (1) 08/01/2020 10/11/2019    Hemoglobin A1c 09/04/2020 3/4/2020    TETANUS VACCINE 09/04/2020 (Originally 8/3/1958) ---    Shingles Vaccine (1 of 2) 09/04/2020 (Originally 8/3/1988) ---    DEXA SCAN 10/30/2020 " 10/30/2017    Eye Exam 11/12/2020 11/12/2019    Override on 11/12/2019: Done    Lipid Panel 03/04/2021 3/4/2020              Patient note was created using [employer].  Any errors in syntax or even information may not have been identified and edited on initial review prior to signing this note.

## 2020-09-05 LAB
ESTIMATED AVG GLUCOSE: 137 MG/DL (ref 68–131)
HBA1C MFR BLD HPLC: 6.4 % (ref 4–5.6)

## 2020-09-28 ENCOUNTER — TELEPHONE (OUTPATIENT)
Dept: FAMILY MEDICINE | Facility: CLINIC | Age: 80
End: 2020-09-28

## 2020-09-28 NOTE — TELEPHONE ENCOUNTER
----- Message from Heather Wetzel sent at 9/28/2020  2:53 PM CDT -----  Regarding: Self/   [phone]  Type: Patient Call Back    Who called:  Patient    What is the request in detail:  Patient would like staff to give her a call regarding medication candesartan (ATACAND) 16 MG tablet.  Thank you    Would the patient rather a call back or a response via [facility]?   Call back    Best call back number:  [phone] (home)       Thank you

## 2020-09-28 NOTE — TELEPHONE ENCOUNTER
Below information given to patient, verbalized   understanding. States she will do below and call back to clinic

## 2020-09-28 NOTE — TELEPHONE ENCOUNTER
Her blood pressure is too high.  She needs to break the candesartan in half and recheck to see if she develops the dizziness again.

## 2020-09-28 NOTE — TELEPHONE ENCOUNTER
Spoke to patient she stated Dr. Perez wanted B/P readings since she stop taking the candsartan and continued with the metoprolol (sept 5) 133/65 (sept 6) 139/60 (sept 7) 145/69 (sept 8) 137/70 (sept 9) 137/69  (sept 10) 156/73 (sept 11) 158/63 (sept 11) 147/63 sept 15 151/58  (Sept 16) 149/66 (sept 18) 149/65 (sept 22) 149/65 (sept 27) 149/59. Patient stated since she have been off of the Candesartan she has not had any dizziness. Please advise

## 2020-10-07 RX ORDER — CANDESARTAN 16 MG/1
16 TABLET ORAL DAILY
Qty: 90 TABLET | Refills: 2 | Status: SHIPPED | OUTPATIENT
Start: 2020-10-07 | End: 2021-11-05

## 2020-12-22 ENCOUNTER — OFFICE VISIT (OUTPATIENT)
Dept: OPTOMETRY | Facility: CLINIC | Age: 80
End: 2020-12-22
Payer: MEDICARE

## 2020-12-22 DIAGNOSIS — E11.9 DIABETES MELLITUS TYPE 2 WITHOUT RETINOPATHY: Primary | ICD-10-CM

## 2020-12-22 DIAGNOSIS — Z96.1 PSEUDOPHAKIA OF BOTH EYES: ICD-10-CM

## 2020-12-22 DIAGNOSIS — H40.013 OAG (OPEN ANGLE GLAUCOMA) SUSPECT, LOW RISK, BILATERAL: ICD-10-CM

## 2020-12-22 DIAGNOSIS — H52.4 MYOPIA WITH ASTIGMATISM AND PRESBYOPIA, BILATERAL: ICD-10-CM

## 2020-12-22 DIAGNOSIS — H52.13 MYOPIA WITH ASTIGMATISM AND PRESBYOPIA, BILATERAL: ICD-10-CM

## 2020-12-22 DIAGNOSIS — H52.203 MYOPIA WITH ASTIGMATISM AND PRESBYOPIA, BILATERAL: ICD-10-CM

## 2020-12-22 PROCEDURE — 1101F PT FALLS ASSESS-DOCD LE1/YR: CPT | Mod: CPTII,S$GLB,, | Performed by: OPTOMETRIST

## 2020-12-22 PROCEDURE — 99999 PR PBB SHADOW E&M-EST. PATIENT-LVL II: CPT | Mod: PBBFAC,,, | Performed by: OPTOMETRIST

## 2020-12-22 PROCEDURE — 99499 RISK ADDL DX/OHS AUDIT: ICD-10-PCS | Mod: S$GLB,,, | Performed by: OPTOMETRIST

## 2020-12-22 PROCEDURE — 2023F PR DILATED RETINAL EXAM W/O EVID OF RETINOPATHY: ICD-10-PCS | Mod: S$GLB,,, | Performed by: OPTOMETRIST

## 2020-12-22 PROCEDURE — 1126F AMNT PAIN NOTED NONE PRSNT: CPT | Mod: S$GLB,,, | Performed by: OPTOMETRIST

## 2020-12-22 PROCEDURE — 3288F FALL RISK ASSESSMENT DOCD: CPT | Mod: CPTII,S$GLB,, | Performed by: OPTOMETRIST

## 2020-12-22 PROCEDURE — 99999 PR PBB SHADOW E&M-EST. PATIENT-LVL II: ICD-10-PCS | Mod: PBBFAC,,, | Performed by: OPTOMETRIST

## 2020-12-22 PROCEDURE — 92014 COMPRE OPH EXAM EST PT 1/>: CPT | Mod: S$GLB,,, | Performed by: OPTOMETRIST

## 2020-12-22 PROCEDURE — 2023F DILAT RTA XM W/O RTNOPTHY: CPT | Mod: S$GLB,,, | Performed by: OPTOMETRIST

## 2020-12-22 PROCEDURE — 1126F PR PAIN SEVERITY QUANTIFIED, NO PAIN PRESENT: ICD-10-PCS | Mod: S$GLB,,, | Performed by: OPTOMETRIST

## 2020-12-22 PROCEDURE — 92015 PR REFRACTION: ICD-10-PCS | Mod: S$GLB,,, | Performed by: OPTOMETRIST

## 2020-12-22 PROCEDURE — 3288F PR FALLS RISK ASSESSMENT DOCUMENTED: ICD-10-PCS | Mod: CPTII,S$GLB,, | Performed by: OPTOMETRIST

## 2020-12-22 PROCEDURE — 92015 DETERMINE REFRACTIVE STATE: CPT | Mod: S$GLB,,, | Performed by: OPTOMETRIST

## 2020-12-22 PROCEDURE — 1101F PR PT FALLS ASSESS DOC 0-1 FALLS W/OUT INJ PAST YR: ICD-10-PCS | Mod: CPTII,S$GLB,, | Performed by: OPTOMETRIST

## 2020-12-22 PROCEDURE — 99499 UNLISTED E&M SERVICE: CPT | Mod: S$GLB,,, | Performed by: OPTOMETRIST

## 2020-12-22 PROCEDURE — 92014 PR EYE EXAM, EST PATIENT,COMPREHESV: ICD-10-PCS | Mod: S$GLB,,, | Performed by: OPTOMETRIST

## 2020-12-22 NOTE — PROGRESS NOTES
HPI     Annual diabetic eye exam  Blur at near>distance  Hemoglobin A1C       Date                     Value               Ref Range             Status                09/04/2020               6.4 (H)             4.0 - 5.6 %           Final                   03/04/2020               6.6 (H)             4.0 - 5.6 %           Final           (-)Flashes (-)Floaters  (-)Itch, (-)tear, (-)burn, (+)Dryness. (+) OTC Drops Sytane PRN.    (-)Photophobia  (-)Glare (--)diplopia (+) headaches          Last edited by Julien Ferrari, OD on 12/22/2020 10:31 AM. (History)            Assessment /Plan     For exam results, see Encounter Report.    Diabetes mellitus type 2 without retinopathy  -No retinopathy noted today.  Continued control with primary care physician and annual comprehensive eye exam.    OAG (open angle glaucoma) suspect, low risk, bilateral  -OCT not available, needs future with HVF  -Low risk based on prior and no progression noted     Pseudophakia of both eyes  -clear, centered    Myopia with astigmatism and presbyopia, bilateral  Eyeglass Final Rx     Eyeglass Final Rx       Sphere Cylinder Axis Dist VA Add    Right -2.00 +3.00 010 20/30 +2.50    Left -1.00 +3.00 165 20/30- +2.50    Type: PAL    Expiration Date: 12/23/2021                  RTC 1 yr

## 2021-03-16 ENCOUNTER — OFFICE VISIT (OUTPATIENT)
Dept: FAMILY MEDICINE | Facility: CLINIC | Age: 81
End: 2021-03-16
Payer: MEDICARE

## 2021-03-16 ENCOUNTER — LAB VISIT (OUTPATIENT)
Dept: LAB | Facility: HOSPITAL | Age: 81
End: 2021-03-16
Attending: FAMILY MEDICINE
Payer: MEDICARE

## 2021-03-16 VITALS
OXYGEN SATURATION: 99 % | HEIGHT: 65 IN | WEIGHT: 206.38 LBS | HEART RATE: 65 BPM | BODY MASS INDEX: 34.38 KG/M2 | SYSTOLIC BLOOD PRESSURE: 130 MMHG | DIASTOLIC BLOOD PRESSURE: 82 MMHG | TEMPERATURE: 98 F

## 2021-03-16 DIAGNOSIS — E66.01 CLASS 2 SEVERE OBESITY DUE TO EXCESS CALORIES WITH SERIOUS COMORBIDITY AND BODY MASS INDEX (BMI) OF 35.0 TO 35.9 IN ADULT: ICD-10-CM

## 2021-03-16 DIAGNOSIS — Z78.0 ASYMPTOMATIC MENOPAUSAL STATE: ICD-10-CM

## 2021-03-16 DIAGNOSIS — E78.5 HYPERLIPIDEMIA, UNSPECIFIED HYPERLIPIDEMIA TYPE: ICD-10-CM

## 2021-03-16 DIAGNOSIS — E11.22 TYPE 2 DIABETES MELLITUS WITH STAGE 3A CHRONIC KIDNEY DISEASE, WITHOUT LONG-TERM CURRENT USE OF INSULIN: Primary | ICD-10-CM

## 2021-03-16 DIAGNOSIS — N18.31 TYPE 2 DIABETES MELLITUS WITH STAGE 3A CHRONIC KIDNEY DISEASE, WITHOUT LONG-TERM CURRENT USE OF INSULIN: Primary | ICD-10-CM

## 2021-03-16 DIAGNOSIS — E11.22 TYPE 2 DIABETES MELLITUS WITH STAGE 3A CHRONIC KIDNEY DISEASE, WITHOUT LONG-TERM CURRENT USE OF INSULIN: ICD-10-CM

## 2021-03-16 DIAGNOSIS — I10 HYPERTENSION, BENIGN: ICD-10-CM

## 2021-03-16 DIAGNOSIS — N18.31 TYPE 2 DIABETES MELLITUS WITH STAGE 3A CHRONIC KIDNEY DISEASE, WITHOUT LONG-TERM CURRENT USE OF INSULIN: ICD-10-CM

## 2021-03-16 DIAGNOSIS — I87.2 VENOUS STASIS DERMATITIS OF BOTH LOWER EXTREMITIES: ICD-10-CM

## 2021-03-16 LAB
ALBUMIN SERPL BCP-MCNC: 3.9 G/DL (ref 3.5–5.2)
ALP SERPL-CCNC: 105 U/L (ref 55–135)
ALT SERPL W/O P-5'-P-CCNC: 10 U/L (ref 10–44)
ANION GAP SERPL CALC-SCNC: 12 MMOL/L (ref 8–16)
AST SERPL-CCNC: 18 U/L (ref 10–40)
BASOPHILS # BLD AUTO: 0.08 K/UL (ref 0–0.2)
BASOPHILS NFR BLD: 1 % (ref 0–1.9)
BILIRUB SERPL-MCNC: 0.6 MG/DL (ref 0.1–1)
BUN SERPL-MCNC: 26 MG/DL (ref 8–23)
CALCIUM SERPL-MCNC: 10.1 MG/DL (ref 8.7–10.5)
CHLORIDE SERPL-SCNC: 103 MMOL/L (ref 95–110)
CHOLEST SERPL-MCNC: 230 MG/DL (ref 120–199)
CHOLEST/HDLC SERPL: 3.5 {RATIO} (ref 2–5)
CO2 SERPL-SCNC: 27 MMOL/L (ref 23–29)
CREAT SERPL-MCNC: 1.5 MG/DL (ref 0.5–1.4)
DIFFERENTIAL METHOD: ABNORMAL
EOSINOPHIL # BLD AUTO: 0.2 K/UL (ref 0–0.5)
EOSINOPHIL NFR BLD: 3 % (ref 0–8)
ERYTHROCYTE [DISTWIDTH] IN BLOOD BY AUTOMATED COUNT: 14.6 % (ref 11.5–14.5)
EST. GFR  (AFRICAN AMERICAN): 37 ML/MIN/1.73 M^2
EST. GFR  (NON AFRICAN AMERICAN): 32 ML/MIN/1.73 M^2
GLUCOSE SERPL-MCNC: 114 MG/DL (ref 70–110)
HCT VFR BLD AUTO: 44 % (ref 37–48.5)
HDLC SERPL-MCNC: 66 MG/DL (ref 40–75)
HDLC SERPL: 28.7 % (ref 20–50)
HGB BLD-MCNC: 13.5 G/DL (ref 12–16)
IMM GRANULOCYTES # BLD AUTO: 0.02 K/UL (ref 0–0.04)
IMM GRANULOCYTES NFR BLD AUTO: 0.2 % (ref 0–0.5)
LDLC SERPL CALC-MCNC: 138.8 MG/DL (ref 63–159)
LYMPHOCYTES # BLD AUTO: 2.9 K/UL (ref 1–4.8)
LYMPHOCYTES NFR BLD: 35.6 % (ref 18–48)
MCH RBC QN AUTO: 27.1 PG (ref 27–31)
MCHC RBC AUTO-ENTMCNC: 30.7 G/DL (ref 32–36)
MCV RBC AUTO: 88 FL (ref 82–98)
MONOCYTES # BLD AUTO: 0.8 K/UL (ref 0.3–1)
MONOCYTES NFR BLD: 9.7 % (ref 4–15)
NEUTROPHILS # BLD AUTO: 4 K/UL (ref 1.8–7.7)
NEUTROPHILS NFR BLD: 50.5 % (ref 38–73)
NONHDLC SERPL-MCNC: 164 MG/DL
NRBC BLD-RTO: 0 /100 WBC
PLATELET # BLD AUTO: 287 K/UL (ref 150–350)
PMV BLD AUTO: 10.5 FL (ref 9.2–12.9)
POTASSIUM SERPL-SCNC: 4.8 MMOL/L (ref 3.5–5.1)
PROT SERPL-MCNC: 8.2 G/DL (ref 6–8.4)
RBC # BLD AUTO: 4.98 M/UL (ref 4–5.4)
SODIUM SERPL-SCNC: 142 MMOL/L (ref 136–145)
TRIGL SERPL-MCNC: 126 MG/DL (ref 30–150)
TSH SERPL DL<=0.005 MIU/L-ACNC: 2.31 UIU/ML (ref 0.4–4)
WBC # BLD AUTO: 8.01 K/UL (ref 3.9–12.7)

## 2021-03-16 PROCEDURE — 1159F PR MEDICATION LIST DOCUMENTED IN MEDICAL RECORD: ICD-10-PCS | Mod: S$GLB,,, | Performed by: FAMILY MEDICINE

## 2021-03-16 PROCEDURE — 3072F PR LOW RISK FOR RETINOPATHY: ICD-10-PCS | Mod: S$GLB,,, | Performed by: FAMILY MEDICINE

## 2021-03-16 PROCEDURE — 85025 COMPLETE CBC W/AUTO DIFF WBC: CPT | Performed by: FAMILY MEDICINE

## 2021-03-16 PROCEDURE — 99499 UNLISTED E&M SERVICE: CPT | Mod: S$GLB,,, | Performed by: FAMILY MEDICINE

## 2021-03-16 PROCEDURE — 1126F PR PAIN SEVERITY QUANTIFIED, NO PAIN PRESENT: ICD-10-PCS | Mod: S$GLB,,, | Performed by: FAMILY MEDICINE

## 2021-03-16 PROCEDURE — 3288F FALL RISK ASSESSMENT DOCD: CPT | Mod: CPTII,S$GLB,, | Performed by: FAMILY MEDICINE

## 2021-03-16 PROCEDURE — 3079F DIAST BP 80-89 MM HG: CPT | Mod: CPTII,S$GLB,, | Performed by: FAMILY MEDICINE

## 2021-03-16 PROCEDURE — 3288F PR FALLS RISK ASSESSMENT DOCUMENTED: ICD-10-PCS | Mod: CPTII,S$GLB,, | Performed by: FAMILY MEDICINE

## 2021-03-16 PROCEDURE — 1159F MED LIST DOCD IN RCRD: CPT | Mod: S$GLB,,, | Performed by: FAMILY MEDICINE

## 2021-03-16 PROCEDURE — 3075F PR MOST RECENT SYSTOLIC BLOOD PRESS GE 130-139MM HG: ICD-10-PCS | Mod: CPTII,S$GLB,, | Performed by: FAMILY MEDICINE

## 2021-03-16 PROCEDURE — 1101F PT FALLS ASSESS-DOCD LE1/YR: CPT | Mod: CPTII,S$GLB,, | Performed by: FAMILY MEDICINE

## 2021-03-16 PROCEDURE — 1126F AMNT PAIN NOTED NONE PRSNT: CPT | Mod: S$GLB,,, | Performed by: FAMILY MEDICINE

## 2021-03-16 PROCEDURE — 3072F LOW RISK FOR RETINOPATHY: CPT | Mod: S$GLB,,, | Performed by: FAMILY MEDICINE

## 2021-03-16 PROCEDURE — 99999 PR PBB SHADOW E&M-EST. PATIENT-LVL IV: CPT | Mod: PBBFAC,,, | Performed by: FAMILY MEDICINE

## 2021-03-16 PROCEDURE — 99215 OFFICE O/P EST HI 40 MIN: CPT | Mod: S$GLB,,, | Performed by: FAMILY MEDICINE

## 2021-03-16 PROCEDURE — 83036 HEMOGLOBIN GLYCOSYLATED A1C: CPT | Performed by: FAMILY MEDICINE

## 2021-03-16 PROCEDURE — 99999 PR PBB SHADOW E&M-EST. PATIENT-LVL IV: ICD-10-PCS | Mod: PBBFAC,,, | Performed by: FAMILY MEDICINE

## 2021-03-16 PROCEDURE — 3075F SYST BP GE 130 - 139MM HG: CPT | Mod: CPTII,S$GLB,, | Performed by: FAMILY MEDICINE

## 2021-03-16 PROCEDURE — 99215 PR OFFICE/OUTPT VISIT, EST, LEVL V, 40-54 MIN: ICD-10-PCS | Mod: S$GLB,,, | Performed by: FAMILY MEDICINE

## 2021-03-16 PROCEDURE — 99499 RISK ADDL DX/OHS AUDIT: ICD-10-PCS | Mod: S$GLB,,, | Performed by: FAMILY MEDICINE

## 2021-03-16 PROCEDURE — 80053 COMPREHEN METABOLIC PANEL: CPT | Performed by: FAMILY MEDICINE

## 2021-03-16 PROCEDURE — 1101F PR PT FALLS ASSESS DOC 0-1 FALLS W/OUT INJ PAST YR: ICD-10-PCS | Mod: CPTII,S$GLB,, | Performed by: FAMILY MEDICINE

## 2021-03-16 PROCEDURE — 36415 COLL VENOUS BLD VENIPUNCTURE: CPT | Mod: PO | Performed by: FAMILY MEDICINE

## 2021-03-16 PROCEDURE — 80061 LIPID PANEL: CPT | Performed by: FAMILY MEDICINE

## 2021-03-16 PROCEDURE — 84443 ASSAY THYROID STIM HORMONE: CPT | Performed by: FAMILY MEDICINE

## 2021-03-16 PROCEDURE — 3079F PR MOST RECENT DIASTOLIC BLOOD PRESSURE 80-89 MM HG: ICD-10-PCS | Mod: CPTII,S$GLB,, | Performed by: FAMILY MEDICINE

## 2021-03-16 RX ORDER — TRIAMCINOLONE ACETONIDE 1 MG/G
CREAM TOPICAL 2 TIMES DAILY
Qty: 2000 G | Refills: 2 | Status: SHIPPED | OUTPATIENT
Start: 2021-03-16 | End: 2023-01-13 | Stop reason: SDUPTHER

## 2021-03-17 LAB
ESTIMATED AVG GLUCOSE: 143 MG/DL (ref 68–131)
HBA1C MFR BLD: 6.6 % (ref 4–5.6)

## 2021-05-18 ENCOUNTER — HOSPITAL ENCOUNTER (OUTPATIENT)
Dept: RADIOLOGY | Facility: CLINIC | Age: 81
Discharge: HOME | End: 2021-05-18
Attending: FAMILY MEDICINE
Payer: MEDICARE

## 2021-05-18 DIAGNOSIS — Z78.0 ASYMPTOMATIC MENOPAUSAL STATE: ICD-10-CM

## 2021-05-18 PROCEDURE — 77080 DXA BONE DENSITY AXIAL: CPT | Mod: TC,PO

## 2021-05-18 PROCEDURE — 77080 DEXA BONE DENSITY SPINE HIP: ICD-10-PCS | Mod: 26,,, | Performed by: INTERNAL MEDICINE

## 2021-05-18 PROCEDURE — 77080 DXA BONE DENSITY AXIAL: CPT | Mod: 26,,, | Performed by: INTERNAL MEDICINE

## 2021-07-14 ENCOUNTER — TELEPHONE (OUTPATIENT)
Dept: FAMILY MEDICINE | Facility: CLINIC | Age: 81
End: 2021-07-14

## 2021-07-14 DIAGNOSIS — Z12.31 ENCOUNTER FOR SCREENING MAMMOGRAM FOR BREAST CANCER: Primary | ICD-10-CM

## 2021-08-10 ENCOUNTER — HOSPITAL ENCOUNTER (OUTPATIENT)
Dept: RADIOLOGY | Facility: HOSPITAL | Age: 81
Discharge: HOME | End: 2021-08-10
Attending: FAMILY MEDICINE
Payer: MEDICARE

## 2021-08-10 VITALS — WEIGHT: 199 LBS | HEIGHT: 65 IN | BODY MASS INDEX: 33.15 KG/M2

## 2021-08-10 DIAGNOSIS — Z12.31 ENCOUNTER FOR SCREENING MAMMOGRAM FOR BREAST CANCER: ICD-10-CM

## 2021-08-10 PROCEDURE — 77067 SCR MAMMO BI INCL CAD: CPT | Mod: TC

## 2021-08-10 PROCEDURE — 77063 MAMMO DIGITAL SCREENING BILAT WITH TOMO: ICD-10-PCS | Mod: 26,,, | Performed by: RADIOLOGY

## 2021-08-10 PROCEDURE — 77063 BREAST TOMOSYNTHESIS BI: CPT | Mod: 26,,, | Performed by: RADIOLOGY

## 2021-08-10 PROCEDURE — 77067 SCR MAMMO BI INCL CAD: CPT | Mod: 26,,, | Performed by: RADIOLOGY

## 2021-08-10 PROCEDURE — 77067 MAMMO DIGITAL SCREENING BILAT WITH TOMO: ICD-10-PCS | Mod: 26,,, | Performed by: RADIOLOGY

## 2022-03-02 DIAGNOSIS — M79.89 LOCALIZED SWELLING OF LOWER EXTREMITY: ICD-10-CM

## 2022-03-02 DIAGNOSIS — I10 HYPERTENSION, BENIGN: ICD-10-CM

## 2022-03-02 DIAGNOSIS — K21.9 GASTROESOPHAGEAL REFLUX DISEASE WITHOUT ESOPHAGITIS: ICD-10-CM

## 2022-03-02 NOTE — TELEPHONE ENCOUNTER
Care Due:                  Date            Visit Type   Department     Provider  --------------------------------------------------------------------------------                                [facility]                              PRIMARY      MED/ INTERNAL  Last Visit: 03-      CARE ([facility])   MED/ PEDS      Di Perez                              [facility]                              PRIMARY      MED/ INTERNAL  Joselito Wu  Next Visit: 03-      CARE ([facility])   MED/ PEDS      Ehrensing                                                            Last  Test          Frequency    Reason                     Performed    Due Date  --------------------------------------------------------------------------------    CMP.........  12 months..  candesartan..............  03- 03-    Powered by [employer]. Reference number: [id number].   3/02/2022 7:05:25 AM CST

## 2022-03-02 NOTE — TELEPHONE ENCOUNTER
This Rx Request does not qualify for assessment with the [facility]   Please review protocol details and the Care Due Message for extra clinical information    Reasons Rx Request may be deferred:  Labs/Vitals overdue  Labs/Vitals abnormal  Patient has been seen in the ED/Hospital since the last PCP visit  Medication is non-delegated [facility] Appropriate Indication Not on Problem List(Gerd,Lerd,Pathological evidence of esophagitis,Brian's,Zollinger Navas Syndrome)  Provider is a non-participating provider    Note composed:3:31 PM 03/02/2022

## 2022-03-02 NOTE — TELEPHONE ENCOUNTER
No new care gaps identified.  Powered by [employer]. Reference number: [id number].   3/02/2022 7:05:58 AM CST

## 2022-03-03 RX ORDER — OMEPRAZOLE 20 MG/1
CAPSULE, DELAYED RELEASE ORAL
Qty: 90 CAPSULE | Refills: 3 | Status: SHIPPED | OUTPATIENT
Start: 2022-03-03 | End: 2023-04-18 | Stop reason: SDUPTHER

## 2022-03-03 RX ORDER — FUROSEMIDE 20 MG/1
TABLET ORAL
Qty: 90 TABLET | Refills: 1 | Status: SHIPPED | OUTPATIENT
Start: 2022-03-03 | End: 2022-09-23

## 2022-03-04 RX ORDER — METOPROLOL TARTRATE 100 MG/1
100 TABLET ORAL 2 TIMES DAILY
Qty: 180 TABLET | Refills: 0 | Status: SHIPPED | OUTPATIENT
Start: 2022-03-04 | End: 2022-06-03

## 2022-03-04 NOTE — TELEPHONE ENCOUNTER
Refill Routing Note   Medication(s) are not appropriate for processing by [facility] for the following reason(s):      - Drug-Disease Interaction (metoprolol tartrate and Venous stasis dermatitis of both lower extremities)    [facility] action(s):  Defer Medication-related problems identified: Drug-disease interaction        --->Care Gap information included in message below if applicable.   Medication reconciliation completed: No   Automatic Epic Generated Protocol Data:        Requested Prescriptions   Pending Prescriptions Disp Refills    metoprolol tartrate (LOPRESSOR) 100 MG tablet [Pharmacy Med Name: METOPROLOL TARTRATE 100 MG TAB] 180 tablet 0     Sig: Take 1 tablet (100 mg total) by mouth 2 (two) times daily.       Cardiovascular:  Beta Blockers Passed - 3/2/2022  7:05 AM        Passed - Patient is at least 18 years old        Passed - Last BP in normal range within 360 days     BP Readings from Last 1 Encounters:   03/16/21 130/82               Passed - Last Heart Rate in normal range within 360 days     Pulse Readings from Last 1 Encounters:   03/16/21 65              Passed - Valid encounter within last 15 months     Recent Visits  Date Type Provider Dept   03/16/21 Office Visit Di Sexton MD [facility] Family Med/ Internal Med/ Peds   09/04/20 Office Visit Di Sexton MD [facility] Family Med/ Internal Med/ Peds   Showing recent visits within past 720 days and meeting all other requirements  Future Appointments  No visits were found meeting these conditions.  Showing future appointments within next 150 days and meeting all other requirements      Future Appointments              In 6 days Joselito Vickers MD [facility]                      Appointments  past 12m or future 3m with PCP    Date Provider   Last Visit   3/16/2021 Di Sexton MD   Next Visit   Visit date not found Di Sexton MD   ED visits in past 90 days: 0        Note composed:3:09 PM  03/04/2022

## 2022-03-10 ENCOUNTER — OFFICE VISIT (OUTPATIENT)
Dept: FAMILY MEDICINE | Facility: CLINIC | Age: 82
End: 2022-03-10
Payer: MEDICARE

## 2022-03-10 ENCOUNTER — LAB VISIT (OUTPATIENT)
Dept: LAB | Facility: HOSPITAL | Age: 82
End: 2022-03-10
Attending: INTERNAL MEDICINE
Payer: MEDICARE

## 2022-03-10 VITALS
WEIGHT: 188.69 LBS | HEIGHT: 65 IN | SYSTOLIC BLOOD PRESSURE: 130 MMHG | TEMPERATURE: 98 F | DIASTOLIC BLOOD PRESSURE: 80 MMHG | OXYGEN SATURATION: 97 % | HEART RATE: 71 BPM | BODY MASS INDEX: 31.44 KG/M2

## 2022-03-10 DIAGNOSIS — I87.2 VENOUS STASIS DERMATITIS OF BOTH LOWER EXTREMITIES: ICD-10-CM

## 2022-03-10 DIAGNOSIS — Z12.31 ENCOUNTER FOR SCREENING MAMMOGRAM FOR BREAST CANCER: ICD-10-CM

## 2022-03-10 DIAGNOSIS — Z00.00 ROUTINE MEDICAL EXAM: Primary | ICD-10-CM

## 2022-03-10 DIAGNOSIS — Z00.00 ROUTINE MEDICAL EXAM: ICD-10-CM

## 2022-03-10 DIAGNOSIS — N18.30 STAGE 3 CHRONIC KIDNEY DISEASE, UNSPECIFIED WHETHER STAGE 3A OR 3B CKD: ICD-10-CM

## 2022-03-10 DIAGNOSIS — E11.22 TYPE 2 DIABETES MELLITUS WITH STAGE 3A CHRONIC KIDNEY DISEASE, WITHOUT LONG-TERM CURRENT USE OF INSULIN: ICD-10-CM

## 2022-03-10 DIAGNOSIS — N18.31 TYPE 2 DIABETES MELLITUS WITH STAGE 3A CHRONIC KIDNEY DISEASE, WITHOUT LONG-TERM CURRENT USE OF INSULIN: ICD-10-CM

## 2022-03-10 DIAGNOSIS — I10 HYPERTENSION, UNSPECIFIED TYPE: ICD-10-CM

## 2022-03-10 DIAGNOSIS — Z12.11 SCREENING FOR COLORECTAL CANCER: ICD-10-CM

## 2022-03-10 DIAGNOSIS — F41.8 SITUATIONAL ANXIETY: ICD-10-CM

## 2022-03-10 DIAGNOSIS — E78.5 HYPERLIPIDEMIA, UNSPECIFIED HYPERLIPIDEMIA TYPE: ICD-10-CM

## 2022-03-10 DIAGNOSIS — E66.01 CLASS 2 SEVERE OBESITY DUE TO EXCESS CALORIES WITH SERIOUS COMORBIDITY AND BODY MASS INDEX (BMI) OF 35.0 TO 35.9 IN ADULT: ICD-10-CM

## 2022-03-10 DIAGNOSIS — N18.30 TYPE 2 DIABETES MELLITUS WITH STAGE 3 CHRONIC KIDNEY DISEASE, WITHOUT LONG-TERM CURRENT USE OF INSULIN, UNSPECIFIED WHETHER STAGE 3A OR 3B CKD: ICD-10-CM

## 2022-03-10 DIAGNOSIS — M85.80 OSTEOPENIA, UNSPECIFIED LOCATION: ICD-10-CM

## 2022-03-10 DIAGNOSIS — E11.65 UNCONTROLLED TYPE 2 DIABETES MELLITUS WITH HYPERGLYCEMIA: ICD-10-CM

## 2022-03-10 DIAGNOSIS — E11.22 TYPE 2 DIABETES MELLITUS WITH STAGE 3 CHRONIC KIDNEY DISEASE, WITHOUT LONG-TERM CURRENT USE OF INSULIN, UNSPECIFIED WHETHER STAGE 3A OR 3B CKD: ICD-10-CM

## 2022-03-10 DIAGNOSIS — Z12.12 SCREENING FOR COLORECTAL CANCER: ICD-10-CM

## 2022-03-10 LAB
ALBUMIN SERPL BCP-MCNC: 3.7 G/DL (ref 3.5–5.2)
ALP SERPL-CCNC: 92 U/L (ref 55–135)
ALT SERPL W/O P-5'-P-CCNC: 20 U/L (ref 10–44)
ANION GAP SERPL CALC-SCNC: 12 MMOL/L (ref 8–16)
AST SERPL-CCNC: 25 U/L (ref 10–40)
BASOPHILS # BLD AUTO: 0.05 K/UL (ref 0–0.2)
BASOPHILS NFR BLD: 0.6 % (ref 0–1.9)
BILIRUB SERPL-MCNC: 0.7 MG/DL (ref 0.1–1)
BUN SERPL-MCNC: 19 MG/DL (ref 8–23)
CALCIUM SERPL-MCNC: 10.2 MG/DL (ref 8.7–10.5)
CHLORIDE SERPL-SCNC: 102 MMOL/L (ref 95–110)
CHOLEST SERPL-MCNC: 198 MG/DL (ref 120–199)
CHOLEST/HDLC SERPL: 3.1 {RATIO} (ref 2–5)
CO2 SERPL-SCNC: 28 MMOL/L (ref 23–29)
CREAT SERPL-MCNC: 1.2 MG/DL (ref 0.5–1.4)
DIFFERENTIAL METHOD: ABNORMAL
EOSINOPHIL # BLD AUTO: 0.1 K/UL (ref 0–0.5)
EOSINOPHIL NFR BLD: 1.4 % (ref 0–8)
ERYTHROCYTE [DISTWIDTH] IN BLOOD BY AUTOMATED COUNT: 14.9 % (ref 11.5–14.5)
EST. GFR  (AFRICAN AMERICAN): 48.3 ML/MIN/1.73 M^2
EST. GFR  (NON AFRICAN AMERICAN): 41.9 ML/MIN/1.73 M^2
ESTIMATED AVG GLUCOSE: 140 MG/DL (ref 68–131)
GLUCOSE SERPL-MCNC: 127 MG/DL (ref 70–110)
HBA1C MFR BLD: 6.5 % (ref 4–5.6)
HCT VFR BLD AUTO: 43.9 % (ref 37–48.5)
HDLC SERPL-MCNC: 64 MG/DL (ref 40–75)
HDLC SERPL: 32.3 % (ref 20–50)
HGB BLD-MCNC: 13.7 G/DL (ref 12–16)
IMM GRANULOCYTES # BLD AUTO: 0.04 K/UL (ref 0–0.04)
IMM GRANULOCYTES NFR BLD AUTO: 0.5 % (ref 0–0.5)
LDLC SERPL CALC-MCNC: 113 MG/DL (ref 63–159)
LYMPHOCYTES # BLD AUTO: 2.7 K/UL (ref 1–4.8)
LYMPHOCYTES NFR BLD: 33.8 % (ref 18–48)
MCH RBC QN AUTO: 27.6 PG (ref 27–31)
MCHC RBC AUTO-ENTMCNC: 31.2 G/DL (ref 32–36)
MCV RBC AUTO: 88 FL (ref 82–98)
MONOCYTES # BLD AUTO: 0.8 K/UL (ref 0.3–1)
MONOCYTES NFR BLD: 10.4 % (ref 4–15)
NEUTROPHILS # BLD AUTO: 4.3 K/UL (ref 1.8–7.7)
NEUTROPHILS NFR BLD: 53.3 % (ref 38–73)
NONHDLC SERPL-MCNC: 134 MG/DL
NRBC BLD-RTO: 0 /100 WBC
PLATELET # BLD AUTO: 291 K/UL (ref 150–450)
PMV BLD AUTO: 11 FL (ref 9.2–12.9)
POTASSIUM SERPL-SCNC: 4.3 MMOL/L (ref 3.5–5.1)
PROT SERPL-MCNC: 8.3 G/DL (ref 6–8.4)
RBC # BLD AUTO: 4.97 M/UL (ref 4–5.4)
SODIUM SERPL-SCNC: 142 MMOL/L (ref 136–145)
TRIGL SERPL-MCNC: 105 MG/DL (ref 30–150)
TSH SERPL DL<=0.005 MIU/L-ACNC: 2.81 UIU/ML (ref 0.4–4)
WBC # BLD AUTO: 8.1 K/UL (ref 3.9–12.7)

## 2022-03-10 PROCEDURE — 1101F PR PT FALLS ASSESS DOC 0-1 FALLS W/OUT INJ PAST YR: ICD-10-PCS | Mod: CPTII,S$GLB,, | Performed by: INTERNAL MEDICINE

## 2022-03-10 PROCEDURE — 85025 COMPLETE CBC W/AUTO DIFF WBC: CPT | Performed by: INTERNAL MEDICINE

## 2022-03-10 PROCEDURE — 3079F PR MOST RECENT DIASTOLIC BLOOD PRESSURE 80-89 MM HG: ICD-10-PCS | Mod: CPTII,S$GLB,, | Performed by: INTERNAL MEDICINE

## 2022-03-10 PROCEDURE — 1159F MED LIST DOCD IN RCRD: CPT | Mod: CPTII,S$GLB,, | Performed by: INTERNAL MEDICINE

## 2022-03-10 PROCEDURE — 99499 RISK ADDL DX/OHS AUDIT: ICD-10-PCS | Mod: S$GLB,,, | Performed by: INTERNAL MEDICINE

## 2022-03-10 PROCEDURE — 3288F FALL RISK ASSESSMENT DOCD: CPT | Mod: CPTII,S$GLB,, | Performed by: INTERNAL MEDICINE

## 2022-03-10 PROCEDURE — 36415 COLL VENOUS BLD VENIPUNCTURE: CPT | Mod: PO | Performed by: INTERNAL MEDICINE

## 2022-03-10 PROCEDURE — 99214 OFFICE O/P EST MOD 30 MIN: CPT | Mod: S$GLB,,, | Performed by: INTERNAL MEDICINE

## 2022-03-10 PROCEDURE — 99214 PR OFFICE/OUTPT VISIT, EST, LEVL IV, 30-39 MIN: ICD-10-PCS | Mod: S$GLB,,, | Performed by: INTERNAL MEDICINE

## 2022-03-10 PROCEDURE — 80061 LIPID PANEL: CPT | Performed by: INTERNAL MEDICINE

## 2022-03-10 PROCEDURE — 3075F PR MOST RECENT SYSTOLIC BLOOD PRESS GE 130-139MM HG: ICD-10-PCS | Mod: CPTII,S$GLB,, | Performed by: INTERNAL MEDICINE

## 2022-03-10 PROCEDURE — 83036 HEMOGLOBIN GLYCOSYLATED A1C: CPT | Performed by: INTERNAL MEDICINE

## 2022-03-10 PROCEDURE — 80053 COMPREHEN METABOLIC PANEL: CPT | Performed by: INTERNAL MEDICINE

## 2022-03-10 PROCEDURE — 84443 ASSAY THYROID STIM HORMONE: CPT | Performed by: INTERNAL MEDICINE

## 2022-03-10 PROCEDURE — 99999 PR PBB SHADOW E&M-EST. PATIENT-LVL III: CPT | Mod: PBBFAC,,, | Performed by: INTERNAL MEDICINE

## 2022-03-10 PROCEDURE — 3288F PR FALLS RISK ASSESSMENT DOCUMENTED: ICD-10-PCS | Mod: CPTII,S$GLB,, | Performed by: INTERNAL MEDICINE

## 2022-03-10 PROCEDURE — 3079F DIAST BP 80-89 MM HG: CPT | Mod: CPTII,S$GLB,, | Performed by: INTERNAL MEDICINE

## 2022-03-10 PROCEDURE — 99499 UNLISTED E&M SERVICE: CPT | Mod: S$GLB,,, | Performed by: INTERNAL MEDICINE

## 2022-03-10 PROCEDURE — 1101F PT FALLS ASSESS-DOCD LE1/YR: CPT | Mod: CPTII,S$GLB,, | Performed by: INTERNAL MEDICINE

## 2022-03-10 PROCEDURE — 3075F SYST BP GE 130 - 139MM HG: CPT | Mod: CPTII,S$GLB,, | Performed by: INTERNAL MEDICINE

## 2022-03-10 PROCEDURE — 1159F PR MEDICATION LIST DOCUMENTED IN MEDICAL RECORD: ICD-10-PCS | Mod: CPTII,S$GLB,, | Performed by: INTERNAL MEDICINE

## 2022-03-10 PROCEDURE — 99999 PR PBB SHADOW E&M-EST. PATIENT-LVL III: ICD-10-PCS | Mod: PBBFAC,,, | Performed by: INTERNAL MEDICINE

## 2022-03-10 RX ORDER — CANDESARTAN 8 MG/1
8 TABLET ORAL DAILY
Qty: 90 TABLET | Refills: 12 | Status: SHIPPED | OUTPATIENT
Start: 2022-03-10 | End: 2022-09-26

## 2022-03-10 RX ORDER — LORAZEPAM 0.5 MG/1
0.5 TABLET ORAL EVERY 8 HOURS PRN
Qty: 45 TABLET | Refills: 3 | Status: SHIPPED | OUTPATIENT
Start: 2022-03-10 | End: 2024-02-12

## 2022-03-10 NOTE — PROGRESS NOTES
Chief complaint:  Physical, establish care    81-year-old white female new to me.  Her  is a patient of mine and he is in the hospital having had MI.  He is very deconditioned and she is somewhat stressed out.  From health maintenance her mammogram was in August of 2021 and we can address a repeat in August of this year..  Her colonoscopy was normal in 2013 apparently.    mammo 8/21, colon nl 2013. Labs a year ago. A1c 6.6. lipids not treated      ROS:   CONST: weight stable. EYES: no vision change. ENT: no sore throat. CV: no chest pain w/ exertion. RESP: no shortness of breath. GI: no nausea, vomiting, diarrhea. No dysphagia. : no urinary issues. MUSCULOSKELETAL: no new myalgias or arthralgias. SKIN: no new changes. NEURO: no focal deficits. PSYCH: no new issues. ENDOCRINE: no polyuria. HEME: no lymph nodes. ALLERGY: no general pruritis.    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    Nevus of choroid     od    Osteopenia 3/10/2022    Type 2 diabetes mellitus with stage 3 chronic kidney disease, without long-term current use of insulin 10/17/2017    Cannot tolerate metformin     Type 2 diabetes mellitus without complication 10/17/2017    Venous stasis dermatitis of both lower extremities 12/11/2019     Past Surgical History:   Procedure Laterality Date    APPENDECTOMY      BREAST BIOPSY Bilateral     CATARACT EXTRACTION  1993/2000    od/os    COLONOSCOPY      HYSTERECTOMY      ORBITAL FRACTURE SURGERY  12/98    os dr coleman    PARTIAL HYSTERECTOMY      RIB FRACTURE SURGERY      TONSILLECTOMY, ADENOIDECTOMY       Social History     Socioeconomic History    Marital status:    Tobacco Use    Smoking status: Never Smoker    Smokeless tobacco: Never Used   Substance and Sexual Activity     Alcohol use: No     Alcohol/week: 0.0 standard drinks    Drug use: No     family history includes Breast cancer in her mother; Cancer in her mother; Cataracts in her father; Hypertension in her father; No Known Problems in her brother, maternal aunt, maternal grandfather, maternal grandmother, maternal uncle, paternal aunt, paternal grandfather, paternal grandmother, paternal uncle, and sister.      Gen: no distress  EYES: conjunctiva clear, non-icteric, PERRL  ENT: nose clear, nasal mucosa normal, oropharynx clear and moist, teeth good  NECK:supple, thyroid non-palpable  RESP: effort is good, lungs clear  CV: heart RRR w/o murmur, gallops or rubs; no carotid bruits, 2+ edema to the left lower extremity to the mid shin about plus one edema on the right.  GI: abdomen soft, non-distended, non-tender, no hepatosplenomegaly  MS: gait normal, no clubbing or cyanosis of the digits  SKIN: no rashes, warm to touch, venous insufficiency and stasis changes to the left lower extremity in the area involved of the edema.  Less so on the right anterior shin.    Labs and x-ray reports reviewed    Char was seen today for establish care.    Diagnoses and all orders for this visit:    Routine medical exam, new to me, up-to-date on breast and colon cancer screening.  Will discuss next year if indeed she wants to pursue another colonoscopy which may not be indicated based on her age.  Obviously may do so if indeed having anemia and so forth  -     Hemoglobin A1C; Future  -     Comprehensive Metabolic Panel; Future  -     TSH; Future  -     Lipid Panel; Future  -     CBC Auto Differential; Future    Encounter for screening mammogram for breast cancer    Screening for colorectal cancer                                                            Additional evaluation and management issues:    Additionally patient has numerous other medical issues to address separate from her physical.  She does appear to have diabetes.  Her A1c a  year ago was 6.6 we discussed getting one at least every six months and if uncontrolled every three months.    Blood pressure appears to be under good control.    She does appear to have an LDL not at goal and denies ever having been on medications we discussed the benefits of being on a statin for heart attack and stroke reduction in diabetics.  Her lipids also would have guideline indications for statin therapy but will reassess at this time and start over    She does have venous stasis dermatitis.  Apparently she did get a prescription for a prescription support stocking but is too tight for her to get on.  She has even purchase some over-the-counter but they were too tight to get on at the ankle but she will continue to pursue trying to find some that she might be able to get on.  We again discussed pathophysiology of leg elevation and that the Lasix 20 mg as she takes daily will not pull the fluid out of her leg.  She will try to do more leg elevation.  We discussed her renal insufficiency and how the Lasix can worsen that we may well need to reduce the frequency of the Lasix should she have worsening kidney disease.  I explained to her stage 3 chronic kidney disease which may be age related and so forth but the Lasix may be contributing some.    Osteopenia reviewed.  Eventual bone density future likely indicated    Also discussed her situational anxiety.  She does occasionally shake and she was embarrassed when she was eating one time.  She does not appear to have a baseline tremor suspicious for Parkinson's.  Her  is in the hospital right now having had an MI and apparently is very short of breath and deconditioned.  He may well need to go to skilled nursing.  I had reviewed his chart as well since he is a patient of mine reassured her it looks like he is improving by review of the records.  She sometimes gets stressed and request something for anxiety.  She does not appear to have anxiety disorder in  need of an SSRI but considered.  For now we will give her some low-dose Ativan explaining potential side effects, increase fall risk, sedation and so forth and she you would only use it when she is nervous and anxious possibly even at night as a sleep aid if indeed she is anxious and thinking about things too much.  That may be the perfect time for.    She has been cutting her Atacand 16 mg and half which is difficult so will adjust it back down to the 8 mg which he has been taking and blood pressure does appear to be under good control on that dose.    Evaluation and management of all the separate issues will be based on medical decision making as below    Labs x-ray reports reviewed                            Assessment plan:      Type 2 diabetes mellitus with stage 3a chronic kidney disease, without long-term current use of insulin, overdue for assessment, currently not on medication.  Looks like it metformin was added but then when they got results of blood work back last year or so they saw the renal insufficiency and discontinue the metformin.  If renal function improves we might be able to put her on low-dose metformin if needed.  Will reassess 1st.  -     Hemoglobin A1C; Future  -     Comprehensive Metabolic Panel; Future  -     TSH; Future  -     Lipid Panel; Future  -     CBC Auto Differential; Future    Type 2 diabetes mellitus with stage 3 chronic kidney disease, without long-term current use of insulin, unspecified whether stage 3a or 3b CKD, reassess, may well have a prerenal component from the Lasix.     Uncontrolled type 2 diabetes mellitus with hyperglycemia    Hypertension, unspecified type, chronic and stable and meds prescribed    Hyperlipidemia, unspecified hyperlipidemia type, reassess and discussed benefits of statin  -     Lipid Panel; Future    Venous stasis dermatitis of both lower extremities, discussed the need to treat the edema with compression stockings if possible and definitely leg  elevation.  Discussed increased risk of cellulitis and watch for that and call promptly for antibiotics.  1 option could be refer to vascular but I think they would just probably recommend elevation and stockings.  Continue Lasix for now and LEs we have reasons to discontinue    Osteopenia, unspecified location, eventual reassessment possible when mammogram is due    Class 2 severe obesity due to excess calories with serious comorbidity and body mass index (BMI) of 35.0 to 35.9 in adult    Stage 3 chronic kidney disease, unspecified whether stage 3a or 3b CKD, reassess  -     Comprehensive Metabolic Panel; Future    Situational anxiety , discussed at length, prescribe some Ativan to use on occasion    Other orders  -     LORazepam (ATIVAN) 0.5 MG tablet; Take 1 tablet (0.5 mg total) by mouth every 8 (eight) hours as needed for Anxiety.  -     candesartan (ATACAND) 8 MG tablet; Take 1 tablet (8 mg total) by mouth once daily.

## 2022-03-16 ENCOUNTER — TELEPHONE (OUTPATIENT)
Dept: FAMILY MEDICINE | Facility: CLINIC | Age: 82
End: 2022-03-16
Payer: MEDICARE

## 2022-03-16 NOTE — TELEPHONE ENCOUNTER
Patient new to the clinic and is not on the my [facility] so please call her with her recent results.    Dr. Vickers says the recent labs look okay.  The blood counts are all normal.  Kidney function looks a little bit better.  Liver and electrolytes normal.  Cholesterol looks good.  A1c sugar test is stable at 6.5.  Thyroid is normal.  Probably good to repeat the kidney and diabetes test in about three months.  Call us then to sent a day and we will put the orders in.

## 2022-03-17 NOTE — TELEPHONE ENCOUNTER
Left a message to call back. Pt ask for labs results and make an appt in 3months. See Dr.E sandar.

## 2022-03-29 ENCOUNTER — PES CALL (OUTPATIENT)
Dept: ADMINISTRATIVE | Facility: CLINIC | Age: 82
End: 2022-03-29
Payer: MEDICARE

## 2022-06-02 DIAGNOSIS — I10 HYPERTENSION, BENIGN: ICD-10-CM

## 2022-06-02 NOTE — TELEPHONE ENCOUNTER
No new care gaps identified.  [facility] Embedded Care Gaps. Reference number: [id number]. 6/02/2022   12:37:43 AM CDT

## 2022-06-03 RX ORDER — METOPROLOL TARTRATE 100 MG/1
TABLET ORAL
Qty: 180 TABLET | Refills: 0 | Status: SHIPPED | OUTPATIENT
Start: 2022-06-03 | End: 2022-09-25

## 2022-06-03 NOTE — TELEPHONE ENCOUNTER
Refill Routing Note   Medication(s) are not appropriate for processing by [facility] for the following reason(s):      - Medication not previously prescribed by PCP    [facility] action(s):  Defer          Medication reconciliation completed: No     Appointments  past 12m or future 3m with PCP    Date Provider   Last Visit   3/10/2022 Joselito Vickers MD   Next Visit   Visit date not found Joselito Vickers MD   ED visits in past 90 days: 0        Note composed:11:05 PM 06/02/2022

## 2022-09-02 ENCOUNTER — TELEPHONE (OUTPATIENT)
Dept: FAMILY MEDICINE | Facility: CLINIC | Age: 82
End: 2022-09-02
Payer: MEDICARE

## 2022-09-02 DIAGNOSIS — Z12.31 BREAST CANCER SCREENING BY MAMMOGRAM: Primary | ICD-10-CM

## 2022-09-02 NOTE — TELEPHONE ENCOUNTER
----- Message from Juan Manuel Dumont sent at 9/2/2022  9:09 AM CDT -----  Contact: [phone]  Pt is calling in to have her orders sent in to have a mammograms, please call to discuss further.

## 2022-09-26 ENCOUNTER — OFFICE VISIT (OUTPATIENT)
Dept: FAMILY MEDICINE | Facility: CLINIC | Age: 82
End: 2022-09-26
Payer: MEDICARE

## 2022-09-26 VITALS
BODY MASS INDEX: 32.06 KG/M2 | HEART RATE: 73 BPM | OXYGEN SATURATION: 97 % | DIASTOLIC BLOOD PRESSURE: 88 MMHG | TEMPERATURE: 98 F | SYSTOLIC BLOOD PRESSURE: 158 MMHG | WEIGHT: 192.44 LBS | HEIGHT: 65 IN

## 2022-09-26 DIAGNOSIS — F41.8 SITUATIONAL ANXIETY: ICD-10-CM

## 2022-09-26 DIAGNOSIS — I10 HYPERTENSION, UNSPECIFIED TYPE: ICD-10-CM

## 2022-09-26 DIAGNOSIS — N18.31 TYPE 2 DIABETES MELLITUS WITH STAGE 3A CHRONIC KIDNEY DISEASE, WITHOUT LONG-TERM CURRENT USE OF INSULIN: ICD-10-CM

## 2022-09-26 DIAGNOSIS — Z12.31 ENCOUNTER FOR SCREENING MAMMOGRAM FOR BREAST CANCER: ICD-10-CM

## 2022-09-26 DIAGNOSIS — N18.30 STAGE 3 CHRONIC KIDNEY DISEASE, UNSPECIFIED WHETHER STAGE 3A OR 3B CKD: ICD-10-CM

## 2022-09-26 DIAGNOSIS — N18.30 TYPE 2 DIABETES MELLITUS WITH STAGE 3 CHRONIC KIDNEY DISEASE, WITHOUT LONG-TERM CURRENT USE OF INSULIN, UNSPECIFIED WHETHER STAGE 3A OR 3B CKD: ICD-10-CM

## 2022-09-26 DIAGNOSIS — E11.65 UNCONTROLLED TYPE 2 DIABETES MELLITUS WITH HYPERGLYCEMIA: ICD-10-CM

## 2022-09-26 DIAGNOSIS — E11.22 TYPE 2 DIABETES MELLITUS WITH STAGE 3A CHRONIC KIDNEY DISEASE, WITHOUT LONG-TERM CURRENT USE OF INSULIN: ICD-10-CM

## 2022-09-26 DIAGNOSIS — E11.22 TYPE 2 DIABETES MELLITUS WITH STAGE 3 CHRONIC KIDNEY DISEASE, WITHOUT LONG-TERM CURRENT USE OF INSULIN, UNSPECIFIED WHETHER STAGE 3A OR 3B CKD: ICD-10-CM

## 2022-09-26 DIAGNOSIS — R42 DIZZINESSES: Primary | ICD-10-CM

## 2022-09-26 DIAGNOSIS — Z23 NEEDS FLU SHOT: ICD-10-CM

## 2022-09-26 DIAGNOSIS — E78.5 HYPERLIPIDEMIA, UNSPECIFIED HYPERLIPIDEMIA TYPE: ICD-10-CM

## 2022-09-26 PROCEDURE — 99999 PR PBB SHADOW E&M-EST. PATIENT-LVL III: CPT | Mod: PBBFAC,,, | Performed by: INTERNAL MEDICINE

## 2022-09-26 PROCEDURE — 3079F PR MOST RECENT DIASTOLIC BLOOD PRESSURE 80-89 MM HG: ICD-10-PCS | Mod: CPTII,S$GLB,, | Performed by: INTERNAL MEDICINE

## 2022-09-26 PROCEDURE — 99214 OFFICE O/P EST MOD 30 MIN: CPT | Mod: 25,S$GLB,, | Performed by: INTERNAL MEDICINE

## 2022-09-26 PROCEDURE — 1101F PR PT FALLS ASSESS DOC 0-1 FALLS W/OUT INJ PAST YR: ICD-10-PCS | Mod: CPTII,S$GLB,, | Performed by: INTERNAL MEDICINE

## 2022-09-26 PROCEDURE — 1159F PR MEDICATION LIST DOCUMENTED IN MEDICAL RECORD: ICD-10-PCS | Mod: CPTII,S$GLB,, | Performed by: INTERNAL MEDICINE

## 2022-09-26 PROCEDURE — 3077F PR MOST RECENT SYSTOLIC BLOOD PRESSURE >= 140 MM HG: ICD-10-PCS | Mod: CPTII,S$GLB,, | Performed by: INTERNAL MEDICINE

## 2022-09-26 PROCEDURE — 3077F SYST BP >= 140 MM HG: CPT | Mod: CPTII,S$GLB,, | Performed by: INTERNAL MEDICINE

## 2022-09-26 PROCEDURE — G0008 FLU VACCINE - QUADRIVALENT - ADJUVANTED: ICD-10-PCS | Mod: S$GLB,,, | Performed by: INTERNAL MEDICINE

## 2022-09-26 PROCEDURE — 3288F PR FALLS RISK ASSESSMENT DOCUMENTED: ICD-10-PCS | Mod: CPTII,S$GLB,, | Performed by: INTERNAL MEDICINE

## 2022-09-26 PROCEDURE — 99214 PR OFFICE/OUTPT VISIT, EST, LEVL IV, 30-39 MIN: ICD-10-PCS | Mod: 25,S$GLB,, | Performed by: INTERNAL MEDICINE

## 2022-09-26 PROCEDURE — G0008 ADMIN INFLUENZA VIRUS VAC: HCPCS | Mod: S$GLB,,, | Performed by: INTERNAL MEDICINE

## 2022-09-26 PROCEDURE — 99999 PR PBB SHADOW E&M-EST. PATIENT-LVL III: ICD-10-PCS | Mod: PBBFAC,,, | Performed by: INTERNAL MEDICINE

## 2022-09-26 PROCEDURE — 3288F FALL RISK ASSESSMENT DOCD: CPT | Mod: CPTII,S$GLB,, | Performed by: INTERNAL MEDICINE

## 2022-09-26 PROCEDURE — 99499 UNLISTED E&M SERVICE: CPT | Mod: S$GLB,,, | Performed by: INTERNAL MEDICINE

## 2022-09-26 PROCEDURE — 3079F DIAST BP 80-89 MM HG: CPT | Mod: CPTII,S$GLB,, | Performed by: INTERNAL MEDICINE

## 2022-09-26 PROCEDURE — 1159F MED LIST DOCD IN RCRD: CPT | Mod: CPTII,S$GLB,, | Performed by: INTERNAL MEDICINE

## 2022-09-26 PROCEDURE — 99499 RISK ADDL DX/OHS AUDIT: ICD-10-PCS | Mod: S$GLB,,, | Performed by: INTERNAL MEDICINE

## 2022-09-26 PROCEDURE — 90694 VACC AIIV4 NO PRSRV 0.5ML IM: CPT | Mod: S$GLB,,, | Performed by: INTERNAL MEDICINE

## 2022-09-26 PROCEDURE — 90694 FLU VACCINE - QUADRIVALENT - ADJUVANTED: ICD-10-PCS | Mod: S$GLB,,, | Performed by: INTERNAL MEDICINE

## 2022-09-26 PROCEDURE — 1101F PT FALLS ASSESS-DOCD LE1/YR: CPT | Mod: CPTII,S$GLB,, | Performed by: INTERNAL MEDICINE

## 2022-09-26 RX ORDER — LISINOPRIL 10 MG/1
10 TABLET ORAL DAILY
Qty: 90 TABLET | Refills: 3 | Status: SHIPPED | OUTPATIENT
Start: 2022-09-26 | End: 2023-07-28 | Stop reason: SDUPTHER

## 2022-09-26 NOTE — PROGRESS NOTES
Chief complaint:  Follow-up on blood pressure and dizziness      Physical 3/22    82-year-old white female new to me 3/22.  Her  is a patient of mine and he was in the hospital 3/22 having had MI.  He eventually did pass.  She felt somewhat down be getting better.  She is here alone but family told her to discuss with me a little bit of mental confusion, slow decision making and slower to respond.  She has no tremor signs of Parkinson's.  She thinks it is getting better may have just been a little bereavement and depression.  From health maintenance her mammogram was in August of 2021 and we can address a repeat in August of this year..  Her colonoscopy was normal in 2013 apparently.    She is willing to schedule her mammogram which she likes to go to primary care imaging center across the [place]    Patient tried other ARB such as valsartan.  Currently on candesartan.  She reduce the dose.  She was still having episodes of dizziness for sound like they were even in bed med made her feel off balance and I wonder if it was not vertigo.  She quit the medication on July 15th and she has had very little if any dizziness.  Her dizziness did occur with turning.  Blood pressure readings off med are 136 up to 150 but really not her normal.  Pulse is 50 4-60 so on the metoprolol.  She has edema requiring her to take her Lasix and so adding Norvasc would be problematic.  She is already on Lasix so adding HCTZ would be problematic.  We discussed trying some lisinopril 10 mg.  I encouraged her to monitor more often and especially when dizzy so we can't really assess if she is having hypotension as I strongly suspect this was intermittent vertigo while on the blood pressure pills.    mammo 8/21, colon nl 2013.  A1c 6.6, 6.5.  lipids not treated        She does appear to have diabetes.  Her A1c  was 6.6, 6.5 March - we discussed getting one at least every six months and if uncontrolled every three months.        She does  appear to have an LDL not at goal and denies ever having been on medications we discussed the benefits of being on a statin for heart attack and stroke reduction in diabetics.  Her lipids also would have guideline indications for statin therapy but will reassess at this time and start over          Evaluation and management of all the  issues will be based on medical decision making as below    Labs x-ray reports reviewed      ROS:   CONST: weight stable. EYES: no vision change. ENT: no sore throat. CV: no chest pain w/ exertion. RESP: no shortness of breath. GI: no nausea, vomiting, diarrhea. No dysphagia. : no urinary issues. MUSCULOSKELETAL: no new myalgias or arthralgias. SKIN: no new changes. NEURO: no focal deficits. PSYCH: no new issues. ENDOCRINE: no polyuria. HEME: no lymph nodes. ALLERGY: no general pruritis.    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    Osteopenia 3/10/2022    Type 2 diabetes mellitus with stage 3 chronic kidney disease, without long-term current use of insulin 10/17/2017    Cannot tolerate metformin     Type 2 diabetes mellitus without complication 10/17/2017    Venous stasis dermatitis of both lower extremities 12/11/2019     Past Surgical History:   Procedure Laterality Date    APPENDECTOMY      BREAST BIOPSY Bilateral     CATARACT EXTRACTION  1993/2000    od/os    COLONOSCOPY      HYSTERECTOMY      ORBITAL FRACTURE SURGERY  12/98    os dr coleman    PARTIAL HYSTERECTOMY      RIB FRACTURE SURGERY      TONSILLECTOMY, ADENOIDECTOMY       Social History     Socioeconomic History    Marital status:    Tobacco Use    Smoking status: Never    Smokeless tobacco: Never   Substance and Sexual Activity    Alcohol use: No     Alcohol/week: 0.0 standard drinks     Drug use: No     family history includes Breast cancer in her mother; Cancer in her mother; Cataracts in her father; Hypertension in her father; No Known Problems in her brother, maternal aunt, maternal grandfather, maternal grandmother, maternal uncle, paternal aunt, paternal grandfather, paternal grandmother, paternal uncle, and sister.      Gen: no distress  +1 to 2 edema  musculoskeletal:  Speech is normal, affect normal, gait appears to be normal.  Tone in the arms is normal with no rigidity.  No tremor.              Assessment plan:      Char was seen today for hypertension.    Diagnoses and all orders for this visit:    Dizzinesses, better off ARB antihypertensive but description of symptoms sounds very much like vertigo.  Will have her start lisinopril in addition to the metoprolol since she definitely something for blood pressure and monitor blood pressure more often watching for baseline, drop some baseline and so forth.  May treat vertigo if we diagnose it if it arises.    Hypertension, unspecified type    Uncontrolled type 2 diabetes mellitus with hyperglycemia, chronic and stable    Hyperlipidemia, unspecified hyperlipidemia type    Type 2 diabetes mellitus with stage 3a chronic kidney disease, without long-term current use of insulin    Type 2 diabetes mellitus with stage 3 chronic kidney disease, without long-term current use of insulin, unspecified whether stage 3a or 3b CKD    Situational anxiety, discussed bereavement, mental slowing and concentration and so forth could well be some grieving and depression but she appears to be improvingFrom that and declines medication at this time.  Watch for Parkinson's.     Stage 3 chronic kidney disease, unspecified whether stage 3a or 3b CKD    Encounter for screening mammogram for breast cancer  -     Mammo Digital Screening Bilat; Future    Other orders  -     lisinopriL 10 MG tablet; Take 1 tablet (10 mg total) by mouth once daily.

## 2022-10-14 ENCOUNTER — PES CALL (OUTPATIENT)
Dept: ADMINISTRATIVE | Facility: CLINIC | Age: 82
End: 2022-10-14
Payer: MEDICARE

## 2022-11-11 ENCOUNTER — HOSPITAL ENCOUNTER (OUTPATIENT)
Dept: RADIOLOGY | Facility: HOSPITAL | Age: 82
Discharge: HOME | End: 2022-11-11
Attending: INTERNAL MEDICINE
Payer: MEDICARE

## 2022-11-11 VITALS — WEIGHT: 185 LBS | BODY MASS INDEX: 30.79 KG/M2

## 2022-11-11 DIAGNOSIS — Z12.31 ENCOUNTER FOR SCREENING MAMMOGRAM FOR BREAST CANCER: ICD-10-CM

## 2022-11-11 PROCEDURE — 77063 BREAST TOMOSYNTHESIS BI: CPT | Mod: 26,,, | Performed by: RADIOLOGY

## 2022-11-11 PROCEDURE — 77063 MAMMO DIGITAL SCREENING BILAT WITH TOMO: ICD-10-PCS | Mod: 26,,, | Performed by: RADIOLOGY

## 2022-11-11 PROCEDURE — 77067 MAMMO DIGITAL SCREENING BILAT WITH TOMO: ICD-10-PCS | Mod: 26,,, | Performed by: RADIOLOGY

## 2022-11-11 PROCEDURE — 77067 SCR MAMMO BI INCL CAD: CPT | Mod: 26,,, | Performed by: RADIOLOGY

## 2022-11-11 PROCEDURE — 77063 BREAST TOMOSYNTHESIS BI: CPT | Mod: TC

## 2022-11-15 ENCOUNTER — PATIENT OUTREACH (OUTPATIENT)
Dept: ADMINISTRATIVE | Facility: HOSPITAL | Age: 82
End: 2022-11-15
Payer: MEDICARE

## 2022-12-13 ENCOUNTER — OFFICE VISIT (OUTPATIENT)
Dept: OPTOMETRY | Facility: CLINIC | Age: 82
End: 2022-12-13
Payer: MEDICARE

## 2022-12-13 DIAGNOSIS — E11.9 DIABETES MELLITUS TYPE 2 WITHOUT RETINOPATHY: Primary | ICD-10-CM

## 2022-12-13 DIAGNOSIS — H52.203 MYOPIA WITH ASTIGMATISM AND PRESBYOPIA, BILATERAL: ICD-10-CM

## 2022-12-13 DIAGNOSIS — H52.13 MYOPIA WITH ASTIGMATISM AND PRESBYOPIA, BILATERAL: ICD-10-CM

## 2022-12-13 DIAGNOSIS — Z96.1 PSEUDOPHAKIA OF BOTH EYES: ICD-10-CM

## 2022-12-13 DIAGNOSIS — H52.4 MYOPIA WITH ASTIGMATISM AND PRESBYOPIA, BILATERAL: ICD-10-CM

## 2022-12-13 DIAGNOSIS — H40.013 OAG (OPEN ANGLE GLAUCOMA) SUSPECT, LOW RISK, BILATERAL: ICD-10-CM

## 2022-12-13 PROCEDURE — 2023F DILAT RTA XM W/O RTNOPTHY: CPT | Mod: CPTII,S$GLB,, | Performed by: OPTOMETRIST

## 2022-12-13 PROCEDURE — 3288F FALL RISK ASSESSMENT DOCD: CPT | Mod: CPTII,S$GLB,, | Performed by: OPTOMETRIST

## 2022-12-13 PROCEDURE — 1126F PR PAIN SEVERITY QUANTIFIED, NO PAIN PRESENT: ICD-10-PCS | Mod: CPTII,S$GLB,, | Performed by: OPTOMETRIST

## 2022-12-13 PROCEDURE — 92014 PR EYE EXAM, EST PATIENT,COMPREHESV: ICD-10-PCS | Mod: S$GLB,,, | Performed by: OPTOMETRIST

## 2022-12-13 PROCEDURE — 1101F PR PT FALLS ASSESS DOC 0-1 FALLS W/OUT INJ PAST YR: ICD-10-PCS | Mod: CPTII,S$GLB,, | Performed by: OPTOMETRIST

## 2022-12-13 PROCEDURE — 3288F PR FALLS RISK ASSESSMENT DOCUMENTED: ICD-10-PCS | Mod: CPTII,S$GLB,, | Performed by: OPTOMETRIST

## 2022-12-13 PROCEDURE — 92133 CPTRZD OPH DX IMG PST SGM ON: CPT | Mod: S$GLB,,, | Performed by: OPTOMETRIST

## 2022-12-13 PROCEDURE — 1101F PT FALLS ASSESS-DOCD LE1/YR: CPT | Mod: CPTII,S$GLB,, | Performed by: OPTOMETRIST

## 2022-12-13 PROCEDURE — 99999 PR PBB SHADOW E&M-EST. PATIENT-LVL III: ICD-10-PCS | Mod: PBBFAC,,, | Performed by: OPTOMETRIST

## 2022-12-13 PROCEDURE — 1159F MED LIST DOCD IN RCRD: CPT | Mod: CPTII,S$GLB,, | Performed by: OPTOMETRIST

## 2022-12-13 PROCEDURE — 1126F AMNT PAIN NOTED NONE PRSNT: CPT | Mod: CPTII,S$GLB,, | Performed by: OPTOMETRIST

## 2022-12-13 PROCEDURE — 92014 COMPRE OPH EXAM EST PT 1/>: CPT | Mod: S$GLB,,, | Performed by: OPTOMETRIST

## 2022-12-13 PROCEDURE — 92133 OCT, OPTIC NERVE - OU - BOTH EYES: ICD-10-PCS | Mod: S$GLB,,, | Performed by: OPTOMETRIST

## 2022-12-13 PROCEDURE — 1159F PR MEDICATION LIST DOCUMENTED IN MEDICAL RECORD: ICD-10-PCS | Mod: CPTII,S$GLB,, | Performed by: OPTOMETRIST

## 2022-12-13 PROCEDURE — 2023F PR DILATED RETINAL EXAM W/O EVID OF RETINOPATHY: ICD-10-PCS | Mod: CPTII,S$GLB,, | Performed by: OPTOMETRIST

## 2022-12-13 PROCEDURE — 99999 PR PBB SHADOW E&M-EST. PATIENT-LVL III: CPT | Mod: PBBFAC,,, | Performed by: OPTOMETRIST

## 2022-12-13 NOTE — PROGRESS NOTES
HPI    Annual diabetic eye exam  Blurry vision  at near and distance  Hemoglobin A1C       Date                     Value               Ref Range             Status             Hemoglobin A1C       Date                     Value               Ref Range             Status                03/10/2022               6.5 (H)             4.0 - 5.6 %           Final                   09/04/2020               6.4 (H)             4.0 - 5.6 %           Final                   03/04/2020               6.6 (H)             4.0 - 5.6 %           Final           (-)Flashes (-)Floaters  (-)Itch, (-)tear, (-)burn, (+)Dryness. (+) OTC Drops Sytane PRN.    (-)Photophobia  (-)Glare (--)diplopia (+) headaches        Last edited by Nghia Warren MA on 12/13/2022 10:55 AM.            Assessment /Plan     For exam results, see Encounter Report.    Diabetes mellitus type 2 without retinopathy  -No retinopathy noted today.  Continued control with primary care physician and annual comprehensive eye exam.    OAG (open angle glaucoma) suspect, low risk, bilateral  -     OCT, Optic Nerve - OU - Both Eyes  -Enlarged CDs with borderline OCT unchanged  -Monitor as low risk, no Gtts    Pseudophakia of both eyes  -clear, centered    Myopia with astigmatism and presbyopia, bilateral  Eyeglass Final Rx       Eyeglass Final Rx         Sphere Cylinder Axis Dist VA Add    Right -1.75 +3.00 010 20/25 +2.50    Left -1.00 +3.00 165 20/25 +2.50      Type: PAL    Expiration Date: 12/13/2023                      RTC 1 yr

## 2023-01-13 DIAGNOSIS — I87.2 VENOUS STASIS DERMATITIS OF BOTH LOWER EXTREMITIES: ICD-10-CM

## 2023-01-13 DIAGNOSIS — I10 HYPERTENSION, BENIGN: ICD-10-CM

## 2023-01-13 RX ORDER — METOPROLOL TARTRATE 100 MG/1
100 TABLET ORAL 2 TIMES DAILY
Qty: 180 TABLET | Refills: 0 | Status: SHIPPED | OUTPATIENT
Start: 2023-01-13 | End: 2023-06-02 | Stop reason: SDUPTHER

## 2023-01-13 RX ORDER — TRIAMCINOLONE ACETONIDE 1 MG/G
CREAM TOPICAL 2 TIMES DAILY
Qty: 2000 G | Refills: 2 | Status: SHIPPED | OUTPATIENT
Start: 2023-01-13 | End: 2024-03-20 | Stop reason: SDUPTHER

## 2023-01-13 NOTE — TELEPHONE ENCOUNTER
----- Message from Sumeet Ferrari sent at 1/13/2023 10:01 AM CST -----  Who Called:        Refill or New Rx: refill         RX Name and Strength:  triamcinolone acetonide 0.1% (KENALOG) 0.1 % cream    metoprolol tartrate (LOPRESSOR) 100 MG tablet        Is this a 30 day or 90 day RX        Preferred Pharmacy with phone number:  [id number] - [place] - [address]        Local or Mail Order: [facility]           Would the patient rather a call back or a response via [facility]? Call back         Best Call Back Number:        Additional Information:

## 2023-01-13 NOTE — TELEPHONE ENCOUNTER
Care Due:                  Date            Visit Type   Department     Provider  --------------------------------------------------------------------------------                                EP [facility]                              PRIMARY      MED/ INTERNAL  Joselito Wu  Last Visit: 09-      CARE ([facility])   MED/ PEDS      Ehrensing  Next Visit: None Scheduled  None         None Found                                                            Last  Test          Frequency    Reason                     Performed    Due Date  --------------------------------------------------------------------------------    CMP.........  12 months..  lisinopriL...............  03-   03-    Health [facility] Embedded Care Gaps. Reference number: [id number]. 1/13/2023   10:29:08 AM CST

## 2023-01-13 NOTE — TELEPHONE ENCOUNTER
Attempted to call patient lvm to call clinic back, patient requesting medications refills. See previous message.

## 2023-02-14 ENCOUNTER — TELEPHONE (OUTPATIENT)
Dept: FAMILY MEDICINE | Facility: CLINIC | Age: 83
End: 2023-02-14
Payer: MEDICARE

## 2023-04-11 DIAGNOSIS — K21.9 GASTROESOPHAGEAL REFLUX DISEASE WITHOUT ESOPHAGITIS: ICD-10-CM

## 2023-04-12 RX ORDER — OMEPRAZOLE 20 MG/1
20 CAPSULE, DELAYED RELEASE ORAL DAILY
Qty: 90 CAPSULE | Refills: 3 | OUTPATIENT
Start: 2023-04-12

## 2023-04-18 DIAGNOSIS — K21.9 GASTROESOPHAGEAL REFLUX DISEASE WITHOUT ESOPHAGITIS: ICD-10-CM

## 2023-04-18 NOTE — TELEPHONE ENCOUNTER
----- Message from Leah Delgado sent at 4/18/2023  4:02 PM CDT -----  Regarding: Refill Request  .Type: RX Refill Request    Who Called: self  Have you contacted your pharmacy: yes    Refill or New Rx: refill    RX Name and Strength:omeprazole (PRILOSEC) 20 MG capsule    Preferred Pharmacy with phone number: .  [facility]/pharmacy #[id number] - [place] - [address]  [address] [place] [address]  Phone: [phone] Fax: [phone]       Local or Mail Order:local    Ordering Provider:Darshan    Would the patient rather a call back or a response via My [facility]? call    Best Call Back Number: [phone]    Additional Information:

## 2023-04-18 NOTE — TELEPHONE ENCOUNTER
Care Due:                  Date            Visit Type   Department     Provider  --------------------------------------------------------------------------------                                EP [facility]                              PRIMARY      MED/ INTERNAL  Joselito Wu  Last Visit: 09-      CARE ([facility])   MED/ PEDS      Ehrensing  Next Visit: None Scheduled  None         None Found                                                            Last  Test          Frequency    Reason                     Performed    Due Date  --------------------------------------------------------------------------------    CMP.........  12 months..  lisinopriL...............  03-   03-    Health [facility] Embedded Care Gaps. Reference number: [id number]. 4/18/2023   4:04:40 PM CDT

## 2023-04-18 NOTE — TELEPHONE ENCOUNTER
----- Message from Leah Delgado sent at 4/18/2023  4:02 PM CDT -----  Regarding: Refill Request  .Type: RX Refill Request    Who Called: self  Have you contacted your pharmacy: yes    Refill or New Rx: refill    RX Name and Strength:omeprazole (PRILOSEC) 20 MG capsule    Preferred Pharmacy with phone number: .  [facility]/pharmacy #[id number] - [place] - [address]  [address] [place] [address]  Phone: [phone] Fax: [phone]       Local or Mail Order:local    Ordering Provider:Darshan    Would the patient rather a call back or a response via [facility]? call    Best Call Back Number: [phone]    Additional Information:

## 2023-04-19 RX ORDER — OMEPRAZOLE 20 MG/1
20 CAPSULE, DELAYED RELEASE ORAL DAILY
Qty: 90 CAPSULE | Refills: 3 | Status: SHIPPED | OUTPATIENT
Start: 2023-04-19

## 2023-04-27 ENCOUNTER — PES CALL (OUTPATIENT)
Dept: ADMINISTRATIVE | Facility: CLINIC | Age: 83
End: 2023-04-27
Payer: MEDICARE

## 2023-05-04 ENCOUNTER — PATIENT MESSAGE (OUTPATIENT)
Dept: ADMINISTRATIVE | Facility: HOSPITAL | Age: 83
End: 2023-05-04
Payer: MEDICARE

## 2023-05-24 ENCOUNTER — PES CALL (OUTPATIENT)
Dept: ADMINISTRATIVE | Facility: CLINIC | Age: 83
End: 2023-05-24
Payer: MEDICARE

## 2023-06-02 DIAGNOSIS — M79.89 LOCALIZED SWELLING OF LOWER EXTREMITY: ICD-10-CM

## 2023-06-02 DIAGNOSIS — I10 HYPERTENSION, BENIGN: ICD-10-CM

## 2023-06-02 NOTE — TELEPHONE ENCOUNTER
No care due was identified.  [facility] Embedded Care Due Messages. Reference number: [id number].   6/02/2023 11:25:24 AM CDT

## 2023-06-02 NOTE — TELEPHONE ENCOUNTER
No care due was identified.  Health [facility] Embedded Care Due Messages. Reference number: [id number].   6/02/2023 11:25:40 AM CDT

## 2023-06-03 ENCOUNTER — PATIENT MESSAGE (OUTPATIENT)
Dept: ADMINISTRATIVE | Facility: HOSPITAL | Age: 83
End: 2023-06-03
Payer: MEDICARE

## 2023-06-03 DIAGNOSIS — E11.9 TYPE 2 DIABETES MELLITUS WITHOUT COMPLICATION, UNSPECIFIED WHETHER LONG TERM INSULIN USE: ICD-10-CM

## 2023-06-05 RX ORDER — FUROSEMIDE 20 MG/1
20 TABLET ORAL DAILY PRN
Qty: 90 TABLET | Refills: 1 | Status: SHIPPED | OUTPATIENT
Start: 2023-06-05 | End: 2023-07-28 | Stop reason: SDUPTHER

## 2023-06-05 RX ORDER — METOPROLOL TARTRATE 100 MG/1
100 TABLET ORAL 2 TIMES DAILY
Qty: 180 TABLET | Refills: 0 | Status: SHIPPED | OUTPATIENT
Start: 2023-06-05 | End: 2023-09-14 | Stop reason: SDUPTHER

## 2023-06-30 ENCOUNTER — PATIENT OUTREACH (OUTPATIENT)
Dept: ADMINISTRATIVE | Facility: HOSPITAL | Age: 83
End: 2023-06-30
Payer: MEDICARE

## 2023-06-30 ENCOUNTER — PATIENT MESSAGE (OUTPATIENT)
Dept: ADMINISTRATIVE | Facility: HOSPITAL | Age: 83
End: 2023-06-30
Payer: MEDICARE

## 2023-06-30 DIAGNOSIS — E78.5 HYPERLIPIDEMIA, UNSPECIFIED HYPERLIPIDEMIA TYPE: ICD-10-CM

## 2023-06-30 DIAGNOSIS — E11.9 TYPE 2 DIABETES MELLITUS WITHOUT COMPLICATION, UNSPECIFIED WHETHER LONG TERM INSULIN USE: Primary | ICD-10-CM

## 2023-07-05 ENCOUNTER — PES CALL (OUTPATIENT)
Dept: ADMINISTRATIVE | Facility: CLINIC | Age: 83
End: 2023-07-05
Payer: MEDICARE

## 2023-07-12 DIAGNOSIS — Z78.0 MENOPAUSE: ICD-10-CM

## 2023-07-26 ENCOUNTER — LAB VISIT (OUTPATIENT)
Dept: LAB | Facility: HOSPITAL | Age: 83
End: 2023-07-26
Attending: INTERNAL MEDICINE
Payer: MEDICARE

## 2023-07-26 DIAGNOSIS — E78.5 HYPERLIPIDEMIA, UNSPECIFIED HYPERLIPIDEMIA TYPE: ICD-10-CM

## 2023-07-26 DIAGNOSIS — E11.9 TYPE 2 DIABETES MELLITUS WITHOUT COMPLICATION, UNSPECIFIED WHETHER LONG TERM INSULIN USE: ICD-10-CM

## 2023-07-26 LAB
ALBUMIN/CREAT UR: 15.7 UG/MG (ref 0–30)
CHOLEST SERPL-MCNC: 238 MG/DL (ref 120–199)
CHOLEST/HDLC SERPL: 3.6 {RATIO} (ref 2–5)
CREAT UR-MCNC: 121 MG/DL (ref 15–325)
HDLC SERPL-MCNC: 66 MG/DL (ref 40–75)
HDLC SERPL: 27.7 % (ref 20–50)
LDLC SERPL CALC-MCNC: 146.8 MG/DL (ref 63–159)
MICROALBUMIN UR DL<=1MG/L-MCNC: 19 UG/ML
NONHDLC SERPL-MCNC: 172 MG/DL
TRIGL SERPL-MCNC: 126 MG/DL (ref 30–150)

## 2023-07-26 PROCEDURE — 36415 COLL VENOUS BLD VENIPUNCTURE: CPT | Mod: PO | Performed by: INTERNAL MEDICINE

## 2023-07-26 PROCEDURE — 82570 ASSAY OF URINE CREATININE: CPT | Performed by: INTERNAL MEDICINE

## 2023-07-26 PROCEDURE — 80061 LIPID PANEL: CPT | Performed by: INTERNAL MEDICINE

## 2023-07-28 ENCOUNTER — OFFICE VISIT (OUTPATIENT)
Dept: FAMILY MEDICINE | Facility: CLINIC | Age: 83
End: 2023-07-28
Payer: MEDICARE

## 2023-07-28 VITALS
HEIGHT: 65 IN | BODY MASS INDEX: 33.13 KG/M2 | SYSTOLIC BLOOD PRESSURE: 158 MMHG | TEMPERATURE: 98 F | HEART RATE: 65 BPM | DIASTOLIC BLOOD PRESSURE: 78 MMHG | WEIGHT: 198.88 LBS | OXYGEN SATURATION: 96 %

## 2023-07-28 DIAGNOSIS — N18.31 TYPE 2 DIABETES MELLITUS WITH STAGE 3A CHRONIC KIDNEY DISEASE, WITHOUT LONG-TERM CURRENT USE OF INSULIN: ICD-10-CM

## 2023-07-28 DIAGNOSIS — E78.5 HYPERLIPIDEMIA, UNSPECIFIED HYPERLIPIDEMIA TYPE: ICD-10-CM

## 2023-07-28 DIAGNOSIS — M85.80 OSTEOPENIA, UNSPECIFIED LOCATION: ICD-10-CM

## 2023-07-28 DIAGNOSIS — N18.30 TYPE 2 DIABETES MELLITUS WITH STAGE 3 CHRONIC KIDNEY DISEASE, WITHOUT LONG-TERM CURRENT USE OF INSULIN, UNSPECIFIED WHETHER STAGE 3A OR 3B CKD: ICD-10-CM

## 2023-07-28 DIAGNOSIS — I10 HYPERTENSION, UNSPECIFIED TYPE: ICD-10-CM

## 2023-07-28 DIAGNOSIS — Z00.00 ROUTINE MEDICAL EXAM: Primary | ICD-10-CM

## 2023-07-28 DIAGNOSIS — Z12.11 SCREENING FOR COLORECTAL CANCER: ICD-10-CM

## 2023-07-28 DIAGNOSIS — Z12.12 SCREENING FOR COLORECTAL CANCER: ICD-10-CM

## 2023-07-28 DIAGNOSIS — M79.89 LOCALIZED SWELLING OF LOWER EXTREMITY: ICD-10-CM

## 2023-07-28 DIAGNOSIS — N18.30 STAGE 3 CHRONIC KIDNEY DISEASE, UNSPECIFIED WHETHER STAGE 3A OR 3B CKD: ICD-10-CM

## 2023-07-28 DIAGNOSIS — R60.9 EDEMA, UNSPECIFIED TYPE: ICD-10-CM

## 2023-07-28 DIAGNOSIS — Z12.31 ENCOUNTER FOR SCREENING MAMMOGRAM FOR BREAST CANCER: ICD-10-CM

## 2023-07-28 DIAGNOSIS — E11.65 UNCONTROLLED TYPE 2 DIABETES MELLITUS WITH HYPERGLYCEMIA: ICD-10-CM

## 2023-07-28 DIAGNOSIS — E11.22 TYPE 2 DIABETES MELLITUS WITH STAGE 3 CHRONIC KIDNEY DISEASE, WITHOUT LONG-TERM CURRENT USE OF INSULIN, UNSPECIFIED WHETHER STAGE 3A OR 3B CKD: ICD-10-CM

## 2023-07-28 DIAGNOSIS — E11.22 TYPE 2 DIABETES MELLITUS WITH STAGE 3A CHRONIC KIDNEY DISEASE, WITHOUT LONG-TERM CURRENT USE OF INSULIN: ICD-10-CM

## 2023-07-28 DIAGNOSIS — I87.2 VENOUS STASIS DERMATITIS OF BOTH LOWER EXTREMITIES: ICD-10-CM

## 2023-07-28 PROCEDURE — 1126F AMNT PAIN NOTED NONE PRSNT: CPT | Mod: CPTII,S$GLB,, | Performed by: INTERNAL MEDICINE

## 2023-07-28 PROCEDURE — 99397 PR PREVENTIVE VISIT,EST,65 & OVER: ICD-10-PCS | Mod: GZ,S$GLB,, | Performed by: INTERNAL MEDICINE

## 2023-07-28 PROCEDURE — 1126F PR PAIN SEVERITY QUANTIFIED, NO PAIN PRESENT: ICD-10-PCS | Mod: CPTII,S$GLB,, | Performed by: INTERNAL MEDICINE

## 2023-07-28 PROCEDURE — 99214 OFFICE O/P EST MOD 30 MIN: CPT | Mod: 25,S$GLB,, | Performed by: INTERNAL MEDICINE

## 2023-07-28 PROCEDURE — 1159F PR MEDICATION LIST DOCUMENTED IN MEDICAL RECORD: ICD-10-PCS | Mod: CPTII,S$GLB,, | Performed by: INTERNAL MEDICINE

## 2023-07-28 PROCEDURE — 3078F PR MOST RECENT DIASTOLIC BLOOD PRESSURE < 80 MM HG: ICD-10-PCS | Mod: CPTII,S$GLB,, | Performed by: INTERNAL MEDICINE

## 2023-07-28 PROCEDURE — 3288F PR FALLS RISK ASSESSMENT DOCUMENTED: ICD-10-PCS | Mod: CPTII,S$GLB,, | Performed by: INTERNAL MEDICINE

## 2023-07-28 PROCEDURE — 99214 PR OFFICE/OUTPT VISIT, EST, LEVL IV, 30-39 MIN: ICD-10-PCS | Mod: 25,S$GLB,, | Performed by: INTERNAL MEDICINE

## 2023-07-28 PROCEDURE — 3077F PR MOST RECENT SYSTOLIC BLOOD PRESSURE >= 140 MM HG: ICD-10-PCS | Mod: CPTII,S$GLB,, | Performed by: INTERNAL MEDICINE

## 2023-07-28 PROCEDURE — 3288F FALL RISK ASSESSMENT DOCD: CPT | Mod: CPTII,S$GLB,, | Performed by: INTERNAL MEDICINE

## 2023-07-28 PROCEDURE — 1101F PR PT FALLS ASSESS DOC 0-1 FALLS W/OUT INJ PAST YR: ICD-10-PCS | Mod: CPTII,S$GLB,, | Performed by: INTERNAL MEDICINE

## 2023-07-28 PROCEDURE — 3072F PR LOW RISK FOR RETINOPATHY: ICD-10-PCS | Mod: CPTII,S$GLB,, | Performed by: INTERNAL MEDICINE

## 2023-07-28 PROCEDURE — 99999 PR PBB SHADOW E&M-EST. PATIENT-LVL III: CPT | Mod: PBBFAC,,, | Performed by: INTERNAL MEDICINE

## 2023-07-28 PROCEDURE — 99397 PER PM REEVAL EST PAT 65+ YR: CPT | Mod: GZ,S$GLB,, | Performed by: INTERNAL MEDICINE

## 2023-07-28 PROCEDURE — 3077F SYST BP >= 140 MM HG: CPT | Mod: CPTII,S$GLB,, | Performed by: INTERNAL MEDICINE

## 2023-07-28 PROCEDURE — 1101F PT FALLS ASSESS-DOCD LE1/YR: CPT | Mod: CPTII,S$GLB,, | Performed by: INTERNAL MEDICINE

## 2023-07-28 PROCEDURE — 3078F DIAST BP <80 MM HG: CPT | Mod: CPTII,S$GLB,, | Performed by: INTERNAL MEDICINE

## 2023-07-28 PROCEDURE — 3072F LOW RISK FOR RETINOPATHY: CPT | Mod: CPTII,S$GLB,, | Performed by: INTERNAL MEDICINE

## 2023-07-28 PROCEDURE — 1159F MED LIST DOCD IN RCRD: CPT | Mod: CPTII,S$GLB,, | Performed by: INTERNAL MEDICINE

## 2023-07-28 PROCEDURE — 99999 PR PBB SHADOW E&M-EST. PATIENT-LVL III: ICD-10-PCS | Mod: PBBFAC,,, | Performed by: INTERNAL MEDICINE

## 2023-07-28 RX ORDER — LISINOPRIL 20 MG/1
20 TABLET ORAL DAILY
Qty: 90 TABLET | Refills: 3 | Status: SHIPPED | OUTPATIENT
Start: 2023-07-28 | End: 2024-07-27

## 2023-07-28 RX ORDER — FUROSEMIDE 20 MG/1
TABLET ORAL
Qty: 180 TABLET | Refills: 5 | Status: SHIPPED | OUTPATIENT
Start: 2023-07-28 | End: 2023-10-25

## 2023-07-28 NOTE — PROGRESS NOTES
Chief complaint:  Physical,     82-year-old white female new to me 3/22.  Her  is a patient of mine and he was in the hospital 3/22 having had MI.  He eventually did pass.  From health maintenance ..  Her colonoscopy was normal in 2013 apparently.    mammo 11/22, colon nl 2013. Labs a year ago. A1c 6.6, 6.5. lipids not treated      ROS:   CONST: weight stable. EYES: no vision change. ENT: no sore throat. CV: no chest pain w/ exertion. RESP: no shortness of breath. GI: no nausea, vomiting, diarrhea. No dysphagia. : no urinary issues. MUSCULOSKELETAL: no new myalgias or arthralgias. SKIN: no new changes. NEURO: no focal deficits. PSYCH: no new issues. ENDOCRINE: no polyuria. HEME: no lymph nodes. ALLERGY: no general pruritis.    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    Osteopenia 3/10/2022    Type 2 diabetes mellitus with stage 3 chronic kidney disease, without long-term current use of insulin 10/17/2017    Cannot tolerate metformin     Type 2 diabetes mellitus without complication 10/17/2017    Venous stasis dermatitis of both lower extremities 12/11/2019     Past Surgical History:   Procedure Laterality Date    APPENDECTOMY      BREAST BIOPSY Bilateral     CATARACT EXTRACTION  1993/2000    od/os    COLONOSCOPY      HYSTERECTOMY      ORBITAL FRACTURE SURGERY  12/98    os dr coleman    PARTIAL HYSTERECTOMY      RIB FRACTURE SURGERY      TONSILLECTOMY, ADENOIDECTOMY       Social History     Socioeconomic History    Marital status:    Tobacco Use    Smoking status: Never    Smokeless tobacco: Never   Substance and Sexual Activity    Alcohol use: No     Alcohol/week: 0.0 standard drinks    Drug use: No     family history includes Breast cancer in her mother; Cancer in her mother;  Cataracts in her father; Hypertension in her father; No Known Problems in her brother, maternal aunt, maternal grandfather, maternal grandmother, maternal uncle, paternal aunt, paternal grandfather, paternal grandmother, paternal uncle, and sister.      Gen: no distress  EYES: conjunctiva clear, non-icteric, PERRL  ENT: nose clear, nasal mucosa normal, oropharynx clear and moist, teeth good  NECK:supple, thyroid non-palpable  RESP: effort is good, lungs clear  CV: heart RRR w/o murmur, gallops or rubs; no carotid bruits, 2+ edema to the left lower extremity to the mid shin about plus one edema on the right.  GI: abdomen soft, non-distended, non-tender, no hepatosplenomegaly  MS: gait normal, no clubbing or cyanosis of the digits  SKIN: no rashes, warm to touch, venous insufficiency and stasis changes to the left lower extremity in the area involved of the edema.  Less so on the right anterior shin.    Labs and x-ray reports reviewed    Char was seen today for establish care.    Diagnoses and all orders for this visit:    Routine medical exam, new to me, up-to-date on breast and colon cancer screening.  Will discuss next year if indeed she wants to pursue another colonoscopy which may not be indicated based on her age.  Obviously may do so if indeed having anemia and so forth  -     Hemoglobin A1C; Future  -     Comprehensive Metabolic Panel; Future  -     TSH; Future  -     Lipid Panel; Future  -     CBC Auto Differential; Future    Encounter for screening mammogram for breast cancer    Screening for colorectal cancer                                                            Additional evaluation and management issues:    Additionally patient has numerous other medical issues to address separate from her physical.  She does appear to have diabetes.  Her A1c a year ago was 6.6 we discussed getting one at least every six months and if uncontrolled every three months.    Blood pressure appears to be under good  control.    She does appear to have an LDL not at goal and denies ever having been on medications we discussed the benefits of being on a statin for heart attack and stroke reduction in diabetics.  Her lipids also would have guideline indications for statin therapy     Bulk order lipid w LDL 140s- pt upset she needs to go back, arm still sore from blood draw.  She will come back next week.  Explained for her to call and make sure we put all lab work it.  Looks like she went online and saw that labs were ordered and came in and had them done.    She has had some problems with the edema lately.  Sounds like she elevates them intermittently only 3 hours per day.  She has stockings but can not get them on so does not use them.  She takes 20 mg of Lasix daily.  We discussed she might need to increase it to 40 on some days but if indeed it is more consistently 40 kg we might need to again do more blood work.  She can use visible swelling and especially when it starts to leak fluids as a sign to take more Lasix.    She does have venous stasis dermatitis.  Apparently she did get a prescription for a prescription support stocking but is too tight for her to get on.  She has even purchase some over-the-counter but they were too tight to get on at the ankle but she will continue to pursue trying to find some that she might be able to get on.  We again discussed pathophysiology of leg elevation and that the Lasix 20 mg as she takes daily will not pull the fluid out of her leg.  She will try to do more leg elevation.  We discussed her renal insufficiency and how the Lasix can worsen that we may well need to reduce the frequency of the Lasix should she have worsening kidney disease.  I explained to her stage 3 chronic kidney disease which may be age related and so forth but the Lasix may be contributing some. May need 40 mg lasix prn as she gets blisters and leaking at times, never had ECHO, so CHF sx at this time    Osteopenia  reviewed.  Eventual bone density future likely indicated    Also discussed her situational anxiety.  She does occasionally shake and she was embarrassed when she was eating one time.  She does not appear to have a baseline tremor suspicious for Parkinson's.  Her  is in the hospital right now having had an MI and apparently is very short of breath and deconditioned.  He may well need to go to skilled nursing.  I had reviewed his chart as well since he is a patient of mine reassured her it looks like he is improving by review of the records.  She sometimes gets stressed and request something for anxiety.  She does not appear to have anxiety disorder in need of an SSRI but considered.  For now we will give her some low-dose Ativan explaining potential side effects, increase fall risk, sedation and so forth and she you would only use it when she is nervous and anxious possibly even at night as a sleep aid if indeed she is anxious and thinking about things too much.  That may be the perfect time for.    Patient had been cutting down her Atacand eventually stopped it.  Patient tried other ARB such as valsartan.   She was still having episodes of dizziness for sound like they were even in bed med made her feel off balance and I wonder if it was not vertigo.  She quit the medication a year ago and she has had very little if any dizziness.  Her dizziness did occur with turning.  Blood pressure readings off med are 136 up to 150 but really not her normal.  Pulse is 50 4-60 so on the metoprolol.  She has edema requiring her to take her Lasix and so adding Norvasc would be problematic.  She is already on Lasix so adding HCTZ would be problematic.  We discussed trying some lisinopril 10 mg and did so a year ago.  Looks like blood pressure still up so will increase the 20 mg     .  I encouraged her to monitor more often and especially when dizzy so we can't really assess if she is having hypotension as I strongly suspect  this was intermittent vertigo while on the blood pressure pills.    Evaluation and management of all the separate issues will be based on medical decision making as below    Labs x-ray reports reviewed                            Assessment plan:         Edema, unspecified type, chronic edema and venous insufficiency.  Since she is unable to get on aware stockings, leg elevation more so will be very important and explained that to her.  On certain days when it is worse or especially when she starts leaking fluid she might need a couple of days of Lasix 40 mg per day and I adjusted the prescription.  Will check BNP and echo although she has no clinical CHF symptoms.  -     BNP; Future  -     Hemoglobin A1C; Future  -     Comprehensive Metabolic Panel; Future  -     TSH; Future  -     CBC Auto Differential; Future  -     Echo    Hypertension, unspecified type, needs to monitor at home so we can assess better baseline.  Will increase lisinopril 10 mg to 20 mg.  -     BNP; Future  -     Hemoglobin A1C; Future  -     Comprehensive Metabolic Panel; Future  -     TSH; Future  -     CBC Auto Differential; Future  -     Echo    Uncontrolled type 2 diabetes mellitus with hyperglycemia, reassess  -     Hemoglobin A1C; Future    Hyperlipidemia, unspecified hyperlipidemia type, chronic and stable    Type 2 diabetes mellitus with stage 3a chronic kidney disease, without long-term current use of insulin    Type 2 diabetes mellitus with stage 3 chronic kidney disease, without long-term current use of insulin, unspecified whether stage 3a or 3b CKD    Stage 3 chronic kidney disease, unspecified whether stage 3a or 3b CKD, reassess especially with use of Lasix and for any increase dosing of Lasix    Venous stasis dermatitis of both lower extremities, more elevation, occasionally increase Lasix, would be great if she could have compression devices but can not get them on    Osteopenia, unspecified location    Localized swelling of  lower extremity  -     furosemide (LASIX) 20 MG tablet; 1-2 a day as directed

## 2023-08-02 ENCOUNTER — LAB VISIT (OUTPATIENT)
Dept: LAB | Facility: HOSPITAL | Age: 83
End: 2023-08-02
Payer: MEDICARE

## 2023-08-02 DIAGNOSIS — I10 HYPERTENSION, UNSPECIFIED TYPE: ICD-10-CM

## 2023-08-02 DIAGNOSIS — Z00.00 ROUTINE MEDICAL EXAM: ICD-10-CM

## 2023-08-02 DIAGNOSIS — E11.65 UNCONTROLLED TYPE 2 DIABETES MELLITUS WITH HYPERGLYCEMIA: ICD-10-CM

## 2023-08-02 DIAGNOSIS — R60.9 EDEMA, UNSPECIFIED TYPE: ICD-10-CM

## 2023-08-02 LAB
ALBUMIN SERPL BCP-MCNC: 3.9 G/DL (ref 3.5–5.2)
ALP SERPL-CCNC: 94 U/L (ref 55–135)
ALT SERPL W/O P-5'-P-CCNC: 14 U/L (ref 10–44)
ANION GAP SERPL CALC-SCNC: 16 MMOL/L (ref 8–16)
AST SERPL-CCNC: 23 U/L (ref 10–40)
BASOPHILS # BLD AUTO: 0.08 K/UL (ref 0–0.2)
BASOPHILS NFR BLD: 0.9 % (ref 0–1.9)
BILIRUB SERPL-MCNC: 0.8 MG/DL (ref 0.1–1)
BNP SERPL-MCNC: 142 PG/ML (ref 0–99)
BUN SERPL-MCNC: 32 MG/DL (ref 8–23)
CALCIUM SERPL-MCNC: 10 MG/DL (ref 8.7–10.5)
CHLORIDE SERPL-SCNC: 100 MMOL/L (ref 95–110)
CO2 SERPL-SCNC: 22 MMOL/L (ref 23–29)
CREAT SERPL-MCNC: 1.6 MG/DL (ref 0.5–1.4)
DIFFERENTIAL METHOD: ABNORMAL
EOSINOPHIL # BLD AUTO: 0.2 K/UL (ref 0–0.5)
EOSINOPHIL NFR BLD: 2.1 % (ref 0–8)
ERYTHROCYTE [DISTWIDTH] IN BLOOD BY AUTOMATED COUNT: 15.3 % (ref 11.5–14.5)
EST. GFR  (NO RACE VARIABLE): 31.6 ML/MIN/1.73 M^2
ESTIMATED AVG GLUCOSE: 140 MG/DL (ref 68–131)
GLUCOSE SERPL-MCNC: 120 MG/DL (ref 70–110)
HBA1C MFR BLD: 6.5 % (ref 4–5.6)
HCT VFR BLD AUTO: 43.5 % (ref 37–48.5)
HGB BLD-MCNC: 14 G/DL (ref 12–16)
IMM GRANULOCYTES # BLD AUTO: 0.03 K/UL (ref 0–0.04)
IMM GRANULOCYTES NFR BLD AUTO: 0.3 % (ref 0–0.5)
LYMPHOCYTES # BLD AUTO: 3.1 K/UL (ref 1–4.8)
LYMPHOCYTES NFR BLD: 36.3 % (ref 18–48)
MCH RBC QN AUTO: 27.8 PG (ref 27–31)
MCHC RBC AUTO-ENTMCNC: 32.2 G/DL (ref 32–36)
MCV RBC AUTO: 86 FL (ref 82–98)
MONOCYTES # BLD AUTO: 1 K/UL (ref 0.3–1)
MONOCYTES NFR BLD: 11.1 % (ref 4–15)
NEUTROPHILS # BLD AUTO: 4.3 K/UL (ref 1.8–7.7)
NEUTROPHILS NFR BLD: 49.3 % (ref 38–73)
NRBC BLD-RTO: 0 /100 WBC
PLATELET # BLD AUTO: 243 K/UL (ref 150–450)
PMV BLD AUTO: 10.8 FL (ref 9.2–12.9)
POTASSIUM SERPL-SCNC: 4.2 MMOL/L (ref 3.5–5.1)
PROT SERPL-MCNC: 8.1 G/DL (ref 6–8.4)
RBC # BLD AUTO: 5.04 M/UL (ref 4–5.4)
SODIUM SERPL-SCNC: 138 MMOL/L (ref 136–145)
TSH SERPL DL<=0.005 MIU/L-ACNC: 3.48 UIU/ML (ref 0.4–4)
WBC # BLD AUTO: 8.65 K/UL (ref 3.9–12.7)

## 2023-08-02 PROCEDURE — 36415 COLL VENOUS BLD VENIPUNCTURE: CPT | Mod: PO | Performed by: INTERNAL MEDICINE

## 2023-08-02 PROCEDURE — 80053 COMPREHEN METABOLIC PANEL: CPT | Performed by: INTERNAL MEDICINE

## 2023-08-02 PROCEDURE — 83880 ASSAY OF NATRIURETIC PEPTIDE: CPT | Performed by: INTERNAL MEDICINE

## 2023-08-02 PROCEDURE — 83036 HEMOGLOBIN GLYCOSYLATED A1C: CPT | Performed by: INTERNAL MEDICINE

## 2023-08-02 PROCEDURE — 84443 ASSAY THYROID STIM HORMONE: CPT | Performed by: INTERNAL MEDICINE

## 2023-08-02 PROCEDURE — 85025 COMPLETE CBC W/AUTO DIFF WBC: CPT | Performed by: INTERNAL MEDICINE

## 2023-08-27 ENCOUNTER — HOSPITAL ENCOUNTER (EMERGENCY)
Facility: HOSPITAL | Age: 83
Discharge: HOME | End: 2023-08-27
Attending: EMERGENCY MEDICINE
Payer: MEDICARE

## 2023-08-27 VITALS
WEIGHT: 180 LBS | TEMPERATURE: 98 F | HEART RATE: 83 BPM | OXYGEN SATURATION: 97 % | DIASTOLIC BLOOD PRESSURE: 71 MMHG | SYSTOLIC BLOOD PRESSURE: 172 MMHG | HEIGHT: 65 IN | BODY MASS INDEX: 29.99 KG/M2 | RESPIRATION RATE: 15 BRPM

## 2023-08-27 DIAGNOSIS — S06.0X0A CONCUSSION WITHOUT LOSS OF CONSCIOUSNESS, INITIAL ENCOUNTER: ICD-10-CM

## 2023-08-27 DIAGNOSIS — S00.12XA CONTUSION OF LEFT PERIOCULAR REGION, INITIAL ENCOUNTER: ICD-10-CM
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
## 2023-08-27 DIAGNOSIS — S01.112A EYEBROW LACERATION, LEFT, INITIAL ENCOUNTER: ICD-10-CM

## 2023-08-27 DIAGNOSIS — S40.021A CONTUSION OF MULTIPLE SITES OF RIGHT UPPER EXTREMITY, INITIAL ENCOUNTER: ICD-10-CM

## 2023-08-27 PROCEDURE — 99284 EMERGENCY DEPT VISIT MOD MDM: CPT | Mod: 25

## 2023-08-27 PROCEDURE — 12011 RPR F/E/E/N/L/M 2.5 CM/<: CPT

## 2023-08-27 PROCEDURE — 25000003 PHARM REV CODE 250: Performed by: EMERGENCY MEDICINE

## 2023-08-27 RX ORDER — ACETAMINOPHEN 500 MG
500 TABLET ORAL EVERY 8 HOURS PRN
Qty: 30 TABLET | Refills: 0
Start: 2023-08-27 | End: 2023-09-06

## 2023-08-27 RX ORDER — ACETAMINOPHEN 325 MG/1
650 TABLET ORAL
Status: COMPLETED | OUTPATIENT
Start: 2023-08-27 | End: 2023-08-27

## 2023-08-27 RX ADMIN — ACETAMINOPHEN 650 MG: 325 TABLET ORAL at 11:08

## 2023-08-27 NOTE — DISCHARGE INSTRUCTIONS
As we discussed it is important that you monitor yourself or any new symptoms.  There was no evidence of injury to your brain but if you develop headache or confusion or new symptoms is important that you go to the ER for repeat head CT.    Your last tetanus shot was in 2020 so you are up-to-date.

## 2023-08-27 NOTE — ED TRIAGE NOTES
Pt reports to the ED for CC of trip and fall on her porch this morning. Pt denies LOC, blurred vision, headache, blood thinner use. Pt has hematoma to left eye. Ice pack applied. Pt has hx of hypertension. Pt is aoox4 and in no apparent distress at this time.

## 2023-08-27 NOTE — ED PROVIDER NOTES
SCRIBE #1 NOTE: I, Mary Segovia, am scribing for, and in the presence of,  Merle Sprague MD. I have scribed the following portions of the note - Other sections scribed: HPI, ROS, PE, MDM.           EM PHYSICIAN NOTE       This patient presents with a complaint of   Chief Complaint   Patient presents with    Fall     Pt to ER with hematoma to left eye s/p trip and fall while on porch. Pt denies LOC, no blood thinner.        HPI: Char Savage is a 83 y.o. female, with a PMHx of DM Type-II, HLD, HTN, who presents to the ED s/p accidental slip and fall at approx. 5:30 AM this morning. Patient reports she was walking into her laundry room and tripped over a 2-by-4, falling onto her knees and face. Has some pain  and swelling over a hematoma and laceration on her left eye, as well as some soreness in her knees. Denies LOC. States she has some pain in her hips due to arthritis but that it is not above baseline. Pt was able to get back up on her own and can walk on her own. Last Tdap was in 2020. Denies being on anti-coags. No other exacerbating or alleviating factors. Denies chest pain, SOB, neck pain, or other associated symptoms.         Review of patient's allergies indicates:   Allergen Reactions    Amoxicillin Other (See Comments)    Bactrim [sulfamethoxazole-trimethoprim] Other (See Comments)    Darvon [propoxyphene] Other (See Comments)    Statins-hmg-coa reductase inhibitors      Flu symptoms    Toradol [ketorolac] Other (See Comments)    Vibramycin [doxycycline calcium] Rash       Preferred pharmacy: [address]        Pertinent REVIEW of SYSTEMS  Source: Patient  The nurse's notes and triage vital signs were reviewed.  GENERAL/CONSTITUTIONAL: There is not a report of fever   CARDIOVASCULAR: There is not a report of chest pain   MUSCULOSKELETAL: See HPI  RESPIRATORY: There is not a report of cough or SOB  GASTROINTESTINAL: There is not a report of  vomiting,  "diarrhea  HEMATOLOGIC/LYMPHATIC: There is not a report of anticoagulant/antithrombotic use.  SKIN: See HPI           PHYSICAL EXAMINATION    ED Triage Vitals [08/27/23 1029]   Enc Vitals Group      BP (!) 170/91      Pulse 60      Resp 18      Temp 97.5 °F (36.4 °C)      Temp Source Oral      SpO2 98 %      Weight 180 lb      Height 5' 5"      Head Circumference       Peak Flow       Pain Score       Pain Loc       Pain Edu?       Excl. in GC?      Vital signs and Pulse Ox reviewed in clinical context. Abnormalities noted:  Elevated blood pressure not at goal  Pt's level of consciousness is Awake and Alert, and the patient is in no distress.  Skin: warm, pink and dry.  Capillary refill is less than 2 seconds. Soft tissue swelling and ecchymosis of left eyelid and left face over left zygomatic arch, 0.5 cm laceration on lateral eyebrow.   Mucosa: normal  Head and Neck: no JVD, neck supple, EOMI  Cardiac exam: RRR   Pulmonary exam: unlabored and clear  Abd Exam: soft nontender   Musculoskeletal: no joint tenderness, deformity or swelling. Ecchymosis over left forearm.   Neurologic: GCS 15; moving all extremities equally, no facial droop       Medical decision making:   Nurses notes and Vital Signs reviewed.       Problems: Today's visit reveals fall with facial trauma which is a/an Acute problem that is concerning for deterioration due to a differential diagnosis that includes intracranial bleed, fractures.     Other problems today include blood pressure elevated not at goal    Indication for head CT in minor head injury 63.     See ER course below for lab test ordered, results reviewed, independent interpretation of images or EKG, discussion with consultants, data obtained from sources other than patient:  ED Course as of 08/27/23 1904   Sun Aug 27, 2023   1337 CT of the C-spine does not reveal fracture [MH]   1338 CT of the max face does not reveal an acute fracture. [MH]   1338 CT of the head is normal [MH]   1410 " I discussed with the patient and her family member the findings of my workup.  Patient will stay with her family member for the next few days. [MH]      ED Course User Index  [MH] Merle Sprague MD       Lac Repair    Date/Time: 8/27/2023 7:02 PM    Performed by: Merle Sprague MD  Authorized by: Merle Sprague MD    Consent:     Consent obtained:  Verbal    Consent given by:  Patient    Risks, benefits, and alternatives were discussed: yes    Universal protocol:     Patient identity confirmed:  Verbally with patient  Laceration details:     Location:  Face    Face location:  Forehead    Length (cm):  0.3    Depth (mm):  0.3  Exploration:     Hemostasis achieved with:  Direct pressure  Treatment:     Area cleansed with:  Saline    Irrigation solution:  Sterile saline    Irrigation method:  Syringe    Undermining:  None  Skin repair:     Repair method:  Tissue adhesive  Repair type:     Repair type:  Simple          Orders Placed This Encounter   Procedures    LACERATION REPAIR    CT Maxillofacial Without Contrast    CT Head Without Contrast    CT Cervical Spine Without Contrast         Diagnoses that have been ruled out:   None   Diagnoses that are still under consideration:   None   Final diagnoses:   Fall, initial encounter   Contusion of left periocular region, initial encounter   Eyebrow laceration, left, initial encounter   Contusion of multiple sites of right upper extremity, initial encounter   Concussion without loss of consciousness, initial encounter               Referral for follow-up  Follow-up Information       Follow up With Specialties Details Why Contact Info    Joselito Vickers MD Internal Medicine  Call to schedule an appointment [id number] [facility]  [place] [id number] [phone]              Prescription management:  ED Prescriptions       Medication Sig Dispense Start Date End Date Auth. Provider    acetaminophen (TYLENOL) 500 MG tablet Take 1 tablet (500 mg total) by mouth  every 8 (eight) hours as needed for Pain. 30 tablet 8/27/2023 9/6/2023 Merle Sprague MD Micelle J Haydel      This note was created using Dictation Software.  This program may occasionally misinterpret certain words and phrases.      SCRIBE ATTESTATION NOTE:   I attest that I personally performed the services documented by the scribe and acknowledged and confirm the content of the note.   Nurses notes were reviewed.  Merle Morgan MD  08/27/23 1905

## 2023-09-12 ENCOUNTER — PATIENT MESSAGE (OUTPATIENT)
Dept: ADMINISTRATIVE | Facility: HOSPITAL | Age: 83
End: 2023-09-12
Payer: MEDICARE

## 2023-09-12 ENCOUNTER — PATIENT OUTREACH (OUTPATIENT)
Dept: ADMINISTRATIVE | Facility: HOSPITAL | Age: 83
End: 2023-09-12
Payer: MEDICARE

## 2023-09-14 DIAGNOSIS — I10 HYPERTENSION, BENIGN: ICD-10-CM

## 2023-09-14 RX ORDER — METOPROLOL TARTRATE 100 MG/1
100 TABLET ORAL 2 TIMES DAILY
Qty: 180 TABLET | Refills: 12 | Status: SHIPPED | OUTPATIENT
Start: 2023-09-14

## 2023-09-14 NOTE — TELEPHONE ENCOUNTER
----- Message from Heather Wetzel sent at 9/14/2023  9:32 AM CDT -----  Regarding: Negar/CAMILLA/  [phone]  Type: RX Refill Request    Who Called:  Negar    Refill or New Rx:  Refill    RX Name and Strength:  metoprolol tartrate (LOPRESSOR) 100 MG tablet    Preferred Pharmacy with phone number:  [facility]/pharmacy #[id number]  [place] - [address]    Local or Mail Order:  Local    Ordering Provider:  GABRIELA Vickers    Would the patient rather a call back or a response via My [facility]?  Call back    Best Call Back Number:  [phone]

## 2023-09-14 NOTE — TELEPHONE ENCOUNTER
No care due was identified.  [facility] Embedded Care Due Messages. Reference number: [id number].   9/14/2023 9:47:31 AM CDT

## 2023-10-05 ENCOUNTER — PATIENT MESSAGE (OUTPATIENT)
Dept: FAMILY MEDICINE | Facility: CLINIC | Age: 83
End: 2023-10-05
Payer: MEDICARE

## 2023-10-05 ENCOUNTER — TELEPHONE (OUTPATIENT)
Dept: FAMILY MEDICINE | Facility: CLINIC | Age: 83
End: 2023-10-05
Payer: MEDICARE

## 2023-10-05 NOTE — TELEPHONE ENCOUNTER
----- Message from Jodi Meléndez sent at 10/5/2023  2:27 PM CDT -----  Regarding: self [phone]  .Type:  Patient Returning Call    Who Called: self   Who Left Message for Patient: ANJANA    Does the patient know what this is regarding? Yes  Pt stated the press the pound and the call will go through when returning call     Would the patient rather a call back or a response via [facility]?  Call back     Best Call Back Number: .[phone]

## 2023-10-25 ENCOUNTER — OFFICE VISIT (OUTPATIENT)
Dept: FAMILY MEDICINE | Facility: CLINIC | Age: 83
End: 2023-10-25
Payer: MEDICARE

## 2023-10-25 VITALS
HEIGHT: 65 IN | HEART RATE: 62 BPM | OXYGEN SATURATION: 97 % | DIASTOLIC BLOOD PRESSURE: 69 MMHG | SYSTOLIC BLOOD PRESSURE: 135 MMHG | TEMPERATURE: 98 F | WEIGHT: 194.69 LBS | BODY MASS INDEX: 32.44 KG/M2

## 2023-10-25 DIAGNOSIS — E78.5 HYPERLIPIDEMIA, UNSPECIFIED HYPERLIPIDEMIA TYPE: ICD-10-CM

## 2023-10-25 DIAGNOSIS — N18.30 STAGE 3 CHRONIC KIDNEY DISEASE, UNSPECIFIED WHETHER STAGE 3A OR 3B CKD: ICD-10-CM

## 2023-10-25 DIAGNOSIS — E66.01 CLASS 2 SEVERE OBESITY DUE TO EXCESS CALORIES WITH SERIOUS COMORBIDITY AND BODY MASS INDEX (BMI) OF 35.0 TO 35.9 IN ADULT: ICD-10-CM

## 2023-10-25 DIAGNOSIS — R60.0 PERIPHERAL EDEMA: ICD-10-CM

## 2023-10-25 DIAGNOSIS — I70.0 AORTIC ATHEROSCLEROSIS: ICD-10-CM

## 2023-10-25 DIAGNOSIS — I10 HYPERTENSION, BENIGN: Primary | ICD-10-CM

## 2023-10-25 DIAGNOSIS — I87.2 VENOUS STASIS DERMATITIS OF BOTH LOWER EXTREMITIES: ICD-10-CM

## 2023-10-25 PROCEDURE — 1101F PR PT FALLS ASSESS DOC 0-1 FALLS W/OUT INJ PAST YR: ICD-10-PCS | Mod: CPTII,S$GLB,, | Performed by: FAMILY MEDICINE

## 2023-10-25 PROCEDURE — 3075F PR MOST RECENT SYSTOLIC BLOOD PRESS GE 130-139MM HG: ICD-10-PCS | Mod: CPTII,S$GLB,, | Performed by: FAMILY MEDICINE

## 2023-10-25 PROCEDURE — 1159F MED LIST DOCD IN RCRD: CPT | Mod: CPTII,S$GLB,, | Performed by: FAMILY MEDICINE

## 2023-10-25 PROCEDURE — 1126F PR PAIN SEVERITY QUANTIFIED, NO PAIN PRESENT: ICD-10-PCS | Mod: CPTII,S$GLB,, | Performed by: FAMILY MEDICINE

## 2023-10-25 PROCEDURE — 1160F RVW MEDS BY RX/DR IN RCRD: CPT | Mod: CPTII,S$GLB,, | Performed by: FAMILY MEDICINE

## 2023-10-25 PROCEDURE — 99214 PR OFFICE/OUTPT VISIT, EST, LEVL IV, 30-39 MIN: ICD-10-PCS | Mod: S$GLB,,, | Performed by: FAMILY MEDICINE

## 2023-10-25 PROCEDURE — 3078F DIAST BP <80 MM HG: CPT | Mod: CPTII,S$GLB,, | Performed by: FAMILY MEDICINE

## 2023-10-25 PROCEDURE — 3078F PR MOST RECENT DIASTOLIC BLOOD PRESSURE < 80 MM HG: ICD-10-PCS | Mod: CPTII,S$GLB,, | Performed by: FAMILY MEDICINE

## 2023-10-25 PROCEDURE — 1101F PT FALLS ASSESS-DOCD LE1/YR: CPT | Mod: CPTII,S$GLB,, | Performed by: FAMILY MEDICINE

## 2023-10-25 PROCEDURE — 3072F PR LOW RISK FOR RETINOPATHY: ICD-10-PCS | Mod: CPTII,S$GLB,, | Performed by: FAMILY MEDICINE

## 2023-10-25 PROCEDURE — 1159F PR MEDICATION LIST DOCUMENTED IN MEDICAL RECORD: ICD-10-PCS | Mod: CPTII,S$GLB,, | Performed by: FAMILY MEDICINE

## 2023-10-25 PROCEDURE — 3075F SYST BP GE 130 - 139MM HG: CPT | Mod: CPTII,S$GLB,, | Performed by: FAMILY MEDICINE

## 2023-10-25 PROCEDURE — 99999 PR PBB SHADOW E&M-EST. PATIENT-LVL III: CPT | Mod: PBBFAC,,, | Performed by: FAMILY MEDICINE

## 2023-10-25 PROCEDURE — 99999 PR PBB SHADOW E&M-EST. PATIENT-LVL III: ICD-10-PCS | Mod: PBBFAC,,, | Performed by: FAMILY MEDICINE

## 2023-10-25 PROCEDURE — 99214 OFFICE O/P EST MOD 30 MIN: CPT | Mod: S$GLB,,, | Performed by: FAMILY MEDICINE

## 2023-10-25 PROCEDURE — 3072F LOW RISK FOR RETINOPATHY: CPT | Mod: CPTII,S$GLB,, | Performed by: FAMILY MEDICINE

## 2023-10-25 PROCEDURE — 3288F PR FALLS RISK ASSESSMENT DOCUMENTED: ICD-10-PCS | Mod: CPTII,S$GLB,, | Performed by: FAMILY MEDICINE

## 2023-10-25 PROCEDURE — 1160F PR REVIEW ALL MEDS BY PRESCRIBER/CLIN PHARMACIST DOCUMENTED: ICD-10-PCS | Mod: CPTII,S$GLB,, | Performed by: FAMILY MEDICINE

## 2023-10-25 PROCEDURE — 3288F FALL RISK ASSESSMENT DOCD: CPT | Mod: CPTII,S$GLB,, | Performed by: FAMILY MEDICINE

## 2023-10-25 PROCEDURE — 1126F AMNT PAIN NOTED NONE PRSNT: CPT | Mod: CPTII,S$GLB,, | Performed by: FAMILY MEDICINE

## 2023-10-25 RX ORDER — FUROSEMIDE 20 MG/1
20 TABLET ORAL 2 TIMES DAILY PRN
Qty: 180 TABLET | Refills: 3 | Status: SHIPPED | OUTPATIENT
Start: 2023-10-25 | End: 2024-10-24

## 2023-10-25 NOTE — PROGRESS NOTES
"  Physical Exam  /69   Pulse 62   Temp 97.9 °F (36.6 °C) (Oral)   Ht 5' 5" (1.651 m)   Wt 88.3 kg (194 lb 10.7 oz)   SpO2 97%   BMI 32.39 kg/m²      Office Visit    Patient Name: Char Savage    : 1940  MRN: [id number]      Assessment/Plan:  Char Savage is a 83 y.o. female who presents today for :    Hypertension, benign  Peripheral edema  -     furosemide (LASIX) 20 MG tablet; Take 1 tablet (20 mg total) by mouth 2 (two) times daily as needed (leg swelling).  Dispense: 180 tablet; Refill: 3  Aortic atherosclerosis  Class 2 severe obesity due to excess calories with serious comorbidity and body mass index (BMI) of 35.0 to 35.9 in adult  Stage 3 chronic kidney disease, unspecified whether stage 3a or 3b CKD  Diet-controlled diabetes/HLD  Venous stasis dermatitis of both lower extremities    - BP well controlled on current regimen, ankle swelling much improved today - consider taking another dose of Lasix 40mg in the afternoons if swelling is not better with her daily morning dose of Lasix.  -continue current antihypertensive medication regimen  -stressed the need for low sodium diet, as well as weight loss.  -in addition, we discussed keeping legs elevated, also to avoid prolonged period of standing/sitting, as well as avoiding regular use of NSAIDs. Patient was  encouraged to wear compression stockings, ambulate more .  -RTC if Sx worsens or call clinic back if pt has any concerns.       This note was created by combination of typed  and MModal dictation.  Transcription errors may be present.  If there are any questions, please contact me.      ----------------------------------------------------------------------------------------------------------------------      HPI:  Patient Care Team:  Joselito Vickers MD as PCP - General (Internal Medicine)  Nadja Abreu MA    Char is a 83 y.o. female with      Patient Active Problem List   Diagnosis    Hypertension, benign    " Hyperlipidemia    Class 2 severe obesity due to excess calories with serious comorbidity and body mass index (BMI) of 35.0 to 35.9 in adult    Benign hypertension with chronic kidney disease, stage III    Type 2 diabetes mellitus with stage 3 chronic kidney disease, without long-term current use of insulin    Venous stasis dermatitis of both lower extremities    Osteopenia    Aortic atherosclerosis     This patient is new to me       Char presents today for:  Leg Swelling and Foot Swelling    She has been having bilateral leg swelling on/off the past few months and had her Lasix refilled by her PCP at her last OV 3 months ago. She continues to take 2 tabs of Lasix 20mg every morning, which helps her keep her leg swelling under control, but for the past week, her leg swelling still persists in the afternoon, which is improved with keeping her leg elevated. She states her leg swelling went down significantly this morning. She denies any pain. Her venous dermatitis statis is still present but she reports that it has gotten better the past few weeks. No CP/SOB/LASSITER/palpitations/claudication/leg pain/abd distention. She does preapre her own meals, and although she doesn't add salt to her food, she suspects some of her pre-prepared foods have fairly decent amount of salt in it. Her BP logs at home shows numbers mostly in the 120-130s/60-70s range.    DM - diet controlled - A1c at goal with recent labwork. No polyuria/polydipsia. No foot numbness/tingling/vision changes/hypoglycemia        Hemoglobin A1C   Date Value Ref Range Status   08/02/2023 6.5 (H) 4.0 - 5.6 % Final     Comment:     ADA Screening Guidelines:  5.7-6.4%  Consistent with prediabetes  >or=6.5%  Consistent with diabetes    High levels of fetal hemoglobin interfere with the HbA1C  assay. Heterozygous hemoglobin variants (HbS, HgC, etc)do  not significantly interfere with this assay.   However, presence of multiple variants may affect accuracy.      03/10/2022 6.5 (H) 4.0 - 5.6 % Final     Comment:     ADA Screening Guidelines:  5.7-6.4%  Consistent with prediabetes  >or=6.5%  Consistent with diabetes    High levels of fetal hemoglobin interfere with the HbA1C  assay. Heterozygous hemoglobin variants (HbS, HgC, etc)do  not significantly interfere with this assay.   However, presence of multiple variants may affect accuracy.     03/16/2021 6.6 (H) 4.0 - 5.6 % Final     Comment:     ADA Screening Guidelines:  5.7-6.4%  Consistent with prediabetes  >or=6.5%  Consistent with diabetes    High levels of fetal hemoglobin interfere with the HbA1C  assay. Heterozygous hemoglobin variants (HbS, HgC, etc)do  not significantly interfere with this assay.   However, presence of multiple variants may affect accuracy.           Additional ROS      CONST: no fever, no activity change  EYES: no vision change.   ENT: no sore throat. No dysphagia.   CV: no CP with exertion, +leg swelling  RESP: no SOB  GI: no N/V/diarrhea/constipation  : no urinary concerns  MSK: no new myalgias or arthralgias.   SKIN: no new rashes  NEURO: no focal deficits.   PSYCH: no new issues.   ENDOCRINE: no polyuria.               Patient Active Problem List   Diagnosis    Hypertension, benign    Hyperlipidemia    Class 2 severe obesity due to excess calories with serious comorbidity and body mass index (BMI) of 35.0 to 35.9 in adult    Benign hypertension with chronic kidney disease, stage III    Type 2 diabetes mellitus with stage 3 chronic kidney disease, without long-term current use of insulin    Venous stasis dermatitis of both lower extremities    Osteopenia    Aortic atherosclerosis       Current Medications  Medications reviewed/updated.     Current Outpatient Medications on File Prior to Visit   Medication Sig Dispense Refill    aspirin (ECOTRIN) 81 MG EC tablet Take 81 mg by mouth once daily.      KETOPROFEN, MICRONIZED TOP APPLY UP TO 3.2 GRAMS (2 PUMPS) TO PAINFUL AREAS UP TO FIVE TIMES  DAILY. RUB IN WELL  3    lisinopriL (PRINIVIL,ZESTRIL) 20 MG tablet Take 1 tablet (20 mg total) by mouth once daily. 90 tablet 3    LORazepam (ATIVAN) 0.5 MG tablet Take 1 tablet (0.5 mg total) by mouth every 8 (eight) hours as needed for Anxiety. (Patient not taking: Reported on 7/28/2023) 45 tablet 3    metoprolol tartrate (LOPRESSOR) 100 MG tablet Take 1 tablet (100 mg total) by mouth 2 (two) times daily. 180 tablet 12    mometasone 0.1% (ELOCON) 0.1 % cream Apply 1 application topically 2 (two) times daily.      multivitamin (THERAGRAN) per tablet Take 1 tablet by mouth once daily.      omeprazole (PRILOSEC) 20 MG capsule Take 1 capsule (20 mg total) by mouth once daily. 90 capsule 3    triamcinolone acetonide 0.1% (KENALOG) 0.1 % cream Apply topically 2 (two) times daily. 2000 g 2    [DISCONTINUED] furosemide (LASIX) 20 MG tablet 1-2 a day as directed 180 tablet 5     No current facility-administered medications on file prior to visit.           Past Surgical History:   Procedure Laterality Date    APPENDECTOMY      BREAST BIOPSY Bilateral     CATARACT EXTRACTION  1993/2000    od/os    COLONOSCOPY      HYSTERECTOMY      ORBITAL FRACTURE SURGERY  12/98    os dr coleman    PARTIAL HYSTERECTOMY      RIB FRACTURE SURGERY      TONSILLECTOMY, ADENOIDECTOMY         Family History   Problem Relation Age of Onset    Cancer Mother     Breast cancer Mother     Cataracts Father     Hypertension Father     No Known Problems Sister     No Known Problems Brother     No Known Problems Maternal Aunt     No Known Problems Maternal Uncle     No Known Problems Paternal Aunt     No Known Problems Paternal Uncle     No Known Problems Maternal Grandmother     No Known Problems Maternal Grandfather     No Known Problems Paternal Grandmother     No Known Problems Paternal Grandfather     Amblyopia Neg Hx     Blindness Neg Hx     Diabetes Neg Hx     Glaucoma Neg Hx     Macular degeneration Neg Hx     Retinal detachment Neg Hx     Strabismus  "Neg Hx     Stroke Neg Hx     Thyroid disease Neg Hx        Social History     Socioeconomic History    Marital status:    Tobacco Use    Smoking status: Never    Smokeless tobacco: Never   Substance and Sexual Activity    Alcohol use: No     Alcohol/week: 0.0 standard drinks of alcohol    Drug use: No             Allergies   Review of patient's allergies indicates:   Allergen Reactions    Amoxicillin Other (See Comments)    Bactrim [sulfamethoxazole-trimethoprim] Other (See Comments)    Darvon [propoxyphene] Other (See Comments)    Statins-hmg-coa reductase inhibitors      Flu symptoms    Toradol [ketorolac] Other (See Comments)    Vibramycin [doxycycline calcium] Rash         Review of Systems  See HPI      [unfilled]  /69   Pulse 62   Temp 97.9 °F (36.6 °C) (Oral)   Ht 5' 5" (1.651 m)   Wt 88.3 kg (194 lb 10.7 oz)   SpO2 97%   BMI 32.39 kg/m²       GEN: NAD, pleasant, well developed, well nourished  HEENT: NCAT, PERRLA, EOMI, sclera clear, anicteric  NECK: normal, supple with midline trachea, no LAD, no thyromegaly  LUNGS: CTAB, no w/r/r, normal respiratory effort  HEART: RRR, normal S1 and S2, no m/r/g, no palpitations, no JVD  ABD: s/nt/nd, NABS, no organomegaly  SKIN: warm and dry with normal turgor, no rashes, no other lesions.   PSYCH: AOx3, appropriate mood and affect.   MSK: extremities warm/well perfused, normal ROM in all 4 extremities, no c/c   LowerExtremities: No generalized TTP bilaterally.  1+ edema of b/l ankles - no palpable cords or calf TTP. Mild statis dermatitis without skin breakdown, no varicosity.  No warmth/erythema. Capillary refill <2 seconds with 2+ DP/PT pulses. Normal dorsiflexion/plantar flexion. Negative Robles's sign  NEURO: normal without focal findings, CN II-XII are intact.  Sensation grossly normal, gait and station normal.   "

## 2023-11-06 ENCOUNTER — HOSPITAL ENCOUNTER (OUTPATIENT)
Dept: RADIOLOGY | Facility: HOSPITAL | Age: 83
Discharge: HOME | End: 2023-11-06
Attending: FAMILY MEDICINE
Payer: MEDICARE

## 2023-11-06 DIAGNOSIS — R60.0 PERIPHERAL EDEMA: ICD-10-CM

## 2023-11-06 PROCEDURE — 93970 EXTREMITY STUDY: CPT | Mod: 26,,, | Performed by: RADIOLOGY

## 2023-11-06 PROCEDURE — 93970 EXTREMITY STUDY: CPT | Mod: TC

## 2023-11-06 PROCEDURE — 93970 US LOWER EXTREMITY VEINS BILATERAL: ICD-10-PCS | Mod: 26,,, | Performed by: RADIOLOGY

## 2023-11-17 ENCOUNTER — OFFICE VISIT (OUTPATIENT)
Dept: CARDIOLOGY | Facility: CLINIC | Age: 83
End: 2023-11-17
Payer: MEDICARE

## 2023-11-17 ENCOUNTER — LAB VISIT (OUTPATIENT)
Dept: LAB | Facility: HOSPITAL | Age: 83
End: 2023-11-17
Attending: INTERNAL MEDICINE
Payer: MEDICARE

## 2023-11-17 VITALS
DIASTOLIC BLOOD PRESSURE: 78 MMHG | HEART RATE: 79 BPM | BODY MASS INDEX: 31.99 KG/M2 | WEIGHT: 192 LBS | HEIGHT: 65 IN | SYSTOLIC BLOOD PRESSURE: 181 MMHG

## 2023-11-17 DIAGNOSIS — E78.2 MIXED HYPERLIPIDEMIA: ICD-10-CM

## 2023-11-17 DIAGNOSIS — E11.22 TYPE 2 DIABETES MELLITUS WITH STAGE 3 CHRONIC KIDNEY DISEASE, WITHOUT LONG-TERM CURRENT USE OF INSULIN, UNSPECIFIED WHETHER STAGE 3A OR 3B CKD: ICD-10-CM

## 2023-11-17 DIAGNOSIS — I10 HYPERTENSION, BENIGN: ICD-10-CM

## 2023-11-17 DIAGNOSIS — N18.30 TYPE 2 DIABETES MELLITUS WITH STAGE 3 CHRONIC KIDNEY DISEASE, WITHOUT LONG-TERM CURRENT USE OF INSULIN, UNSPECIFIED WHETHER STAGE 3A OR 3B CKD: ICD-10-CM

## 2023-11-17 DIAGNOSIS — R60.0 EDEMA OF BOTH LOWER EXTREMITIES: Primary | ICD-10-CM

## 2023-11-17 DIAGNOSIS — R60.0 EDEMA OF BOTH LOWER EXTREMITIES: ICD-10-CM

## 2023-11-17 DIAGNOSIS — M79.3 LIPODERMATOSCLEROSIS OF BOTH LOWER EXTREMITIES: ICD-10-CM

## 2023-11-17 DIAGNOSIS — I89.0 LYMPHEDEMA OF BOTH LOWER EXTREMITIES: ICD-10-CM

## 2023-11-17 LAB
ALBUMIN SERPL BCP-MCNC: 3.9 G/DL (ref 3.5–5.2)
ALP SERPL-CCNC: 107 U/L (ref 55–135)
ALT SERPL W/O P-5'-P-CCNC: 13 U/L (ref 10–44)
ANION GAP SERPL CALC-SCNC: 11 MMOL/L (ref 8–16)
AST SERPL-CCNC: 22 U/L (ref 10–40)
BILIRUB SERPL-MCNC: 0.6 MG/DL (ref 0.1–1)
BUN SERPL-MCNC: 40 MG/DL (ref 8–23)
CALCIUM SERPL-MCNC: 10.4 MG/DL (ref 8.7–10.5)
CHLORIDE SERPL-SCNC: 103 MMOL/L (ref 95–110)
CO2 SERPL-SCNC: 26 MMOL/L (ref 23–29)
CREAT SERPL-MCNC: 1.8 MG/DL (ref 0.5–1.4)
EST. GFR  (NO RACE VARIABLE): 27.3 ML/MIN/1.73 M^2
GLUCOSE SERPL-MCNC: 138 MG/DL (ref 70–110)
POTASSIUM SERPL-SCNC: 4.8 MMOL/L (ref 3.5–5.1)
PROT SERPL-MCNC: 8.2 G/DL (ref 6–8.4)
SODIUM SERPL-SCNC: 140 MMOL/L (ref 136–145)

## 2023-11-17 PROCEDURE — 3077F SYST BP >= 140 MM HG: CPT | Mod: CPTII,S$GLB,, | Performed by: INTERNAL MEDICINE

## 2023-11-17 PROCEDURE — 3288F FALL RISK ASSESSMENT DOCD: CPT | Mod: CPTII,S$GLB,, | Performed by: INTERNAL MEDICINE

## 2023-11-17 PROCEDURE — 3077F PR MOST RECENT SYSTOLIC BLOOD PRESSURE >= 140 MM HG: ICD-10-PCS | Mod: CPTII,S$GLB,, | Performed by: INTERNAL MEDICINE

## 2023-11-17 PROCEDURE — 3078F PR MOST RECENT DIASTOLIC BLOOD PRESSURE < 80 MM HG: ICD-10-PCS | Mod: CPTII,S$GLB,, | Performed by: INTERNAL MEDICINE

## 2023-11-17 PROCEDURE — 99999 PR PBB SHADOW E&M-EST. PATIENT-LVL IV: ICD-10-PCS | Mod: PBBFAC,,, | Performed by: INTERNAL MEDICINE

## 2023-11-17 PROCEDURE — 3288F PR FALLS RISK ASSESSMENT DOCUMENTED: ICD-10-PCS | Mod: CPTII,S$GLB,, | Performed by: INTERNAL MEDICINE

## 2023-11-17 PROCEDURE — 1159F PR MEDICATION LIST DOCUMENTED IN MEDICAL RECORD: ICD-10-PCS | Mod: CPTII,S$GLB,, | Performed by: INTERNAL MEDICINE

## 2023-11-17 PROCEDURE — 1125F AMNT PAIN NOTED PAIN PRSNT: CPT | Mod: CPTII,S$GLB,, | Performed by: INTERNAL MEDICINE

## 2023-11-17 PROCEDURE — 99999 PR PBB SHADOW E&M-EST. PATIENT-LVL IV: CPT | Mod: PBBFAC,,, | Performed by: INTERNAL MEDICINE

## 2023-11-17 PROCEDURE — 99205 OFFICE O/P NEW HI 60 MIN: CPT | Mod: S$GLB,,, | Performed by: INTERNAL MEDICINE

## 2023-11-17 PROCEDURE — 3072F PR LOW RISK FOR RETINOPATHY: ICD-10-PCS | Mod: CPTII,S$GLB,, | Performed by: INTERNAL MEDICINE

## 2023-11-17 PROCEDURE — 80053 COMPREHEN METABOLIC PANEL: CPT | Performed by: INTERNAL MEDICINE

## 2023-11-17 PROCEDURE — 1125F PR PAIN SEVERITY QUANTIFIED, PAIN PRESENT: ICD-10-PCS | Mod: CPTII,S$GLB,, | Performed by: INTERNAL MEDICINE

## 2023-11-17 PROCEDURE — 99205 PR OFFICE/OUTPT VISIT, NEW, LEVL V, 60-74 MIN: ICD-10-PCS | Mod: S$GLB,,, | Performed by: INTERNAL MEDICINE

## 2023-11-17 PROCEDURE — 3072F LOW RISK FOR RETINOPATHY: CPT | Mod: CPTII,S$GLB,, | Performed by: INTERNAL MEDICINE

## 2023-11-17 PROCEDURE — 1100F PTFALLS ASSESS-DOCD GE2>/YR: CPT | Mod: CPTII,S$GLB,, | Performed by: INTERNAL MEDICINE

## 2023-11-17 PROCEDURE — 1100F PR PT FALLS ASSESS DOC 2+ FALLS/FALL W/INJURY/YR: ICD-10-PCS | Mod: CPTII,S$GLB,, | Performed by: INTERNAL MEDICINE

## 2023-11-17 PROCEDURE — 3078F DIAST BP <80 MM HG: CPT | Mod: CPTII,S$GLB,, | Performed by: INTERNAL MEDICINE

## 2023-11-17 PROCEDURE — 1159F MED LIST DOCD IN RCRD: CPT | Mod: CPTII,S$GLB,, | Performed by: INTERNAL MEDICINE

## 2023-11-17 PROCEDURE — 36415 COLL VENOUS BLD VENIPUNCTURE: CPT | Performed by: INTERNAL MEDICINE

## 2023-11-17 NOTE — PROGRESS NOTES
[facility]      Chief Complaint   Patient presents with    Leg pain and swelling       Patient ID: Char Savage is a 83 y.o. female with HTN, HLD, DM2, CKD stage 3, obesity, who presents for an initial appointment.  Pertinent history/events are as follows:     -Pt kindly referred by Dr. Vickers for evaluation of leg swelling.     HPI:  Mrs. Savage reports leg swelling for several years.  She reports periods of ulcers/wounds in 2019.  No ulcers/wounds currently.  She has no claudication, no chest pain or SOB.  Currently taking lasix 40 mg bid.  BNP mildly elevated at 142.  BLE Venous Ultrasound on 11/6/2023 demonstrated no evidence of deep venous thrombosis in either lower extremity.       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    Osteopenia 3/10/2022    Type 2 diabetes mellitus with stage 3 chronic kidney disease, without long-term current use of insulin 10/17/2017    Cannot tolerate metformin     Type 2 diabetes mellitus without complication 10/17/2017    Venous stasis dermatitis of both lower extremities 12/11/2019     Past Surgical History:   Procedure Laterality Date    APPENDECTOMY      BREAST BIOPSY Bilateral     CATARACT EXTRACTION  1993/2000    od/os    COLONOSCOPY      HYSTERECTOMY      ORBITAL FRACTURE SURGERY  12/98    os dr coleman    PARTIAL HYSTERECTOMY      RIB FRACTURE SURGERY      TONSILLECTOMY, ADENOIDECTOMY       Social History     Socioeconomic History    Marital status:    Tobacco Use    Smoking status: Never    Smokeless tobacco: Never   Substance and Sexual Activity    Alcohol use: No     Alcohol/week: 0.0 standard drinks of alcohol    Drug use: No     Family History   Problem Relation Age of Onset    Cancer Mother     Breast cancer Mother      Cataracts Father     Hypertension Father     No Known Problems Sister     No Known Problems Brother     No Known Problems Maternal Aunt     No Known Problems Maternal Uncle     No Known Problems Paternal Aunt     No Known Problems Paternal Uncle     No Known Problems Maternal Grandmother     No Known Problems Maternal Grandfather     No Known Problems Paternal Grandmother     No Known Problems Paternal Grandfather     Amblyopia Neg Hx     Blindness Neg Hx     Diabetes Neg Hx     Glaucoma Neg Hx     Macular degeneration Neg Hx     Retinal detachment Neg Hx     Strabismus Neg Hx     Stroke Neg Hx     Thyroid disease Neg Hx        Review of patient's allergies indicates:   Allergen Reactions    Amoxicillin Other (See Comments)    Bactrim [sulfamethoxazole-trimethoprim] Other (See Comments)    Darvon [propoxyphene] Other (See Comments)    Statins-hmg-coa reductase inhibitors      Flu symptoms    Toradol [ketorolac] Other (See Comments)    Vibramycin [doxycycline calcium] Rash       Medication List with Changes/Refills   Current Medications    ASPIRIN (ECOTRIN) 81 MG EC TABLET    Take 81 mg by mouth once daily.    FUROSEMIDE (LASIX) 20 MG TABLET    Take 1 tablet (20 mg total) by mouth 2 (two) times daily as needed (leg swelling).    KETOPROFEN, MICRONIZED TOP    APPLY UP TO 3.2 GRAMS (2 PUMPS) TO PAINFUL AREAS UP TO FIVE TIMES DAILY. RUB IN WELL    LISINOPRIL (PRINIVIL,ZESTRIL) 20 MG TABLET    Take 1 tablet (20 mg total) by mouth once daily.    LORAZEPAM (ATIVAN) 0.5 MG TABLET    Take 1 tablet (0.5 mg total) by mouth every 8 (eight) hours as needed for Anxiety.    METOPROLOL TARTRATE (LOPRESSOR) 100 MG TABLET    Take 1 tablet (100 mg total) by mouth 2 (two) times daily.    MOMETASONE 0.1% (ELOCON) 0.1 % CREAM    Apply 1 application topically 2 (two) times daily.    MULTIVITAMIN (THERAGRAN) PER TABLET    Take 1 tablet by mouth once daily.    OMEPRAZOLE (PRILOSEC) 20 MG CAPSULE    Take 1 capsule (20 mg total) by mouth  once daily.    TRIAMCINOLONE ACETONIDE 0.1% (KENALOG) 0.1 % CREAM    Apply topically 2 (two) times daily.       Review of Systems  Constitution: Denies chills, fever, and sweats.  HENT: Denies headaches or blurry vision.  Cardiovascular: Denies chest pain or irregular heart beat.  Respiratory: Denies cough or shortness of breath.  Gastrointestinal: Denies abdominal pain, nausea, or vomiting.  Musculoskeletal: Denies muscle cramps.  Neurological: Denies dizziness or focal weakness.  Psychiatric/Behavioral: Normal mental status.  Hematologic/Lymphatic: Denies bleeding problem or easy bruising/bleeding.  Skin: Denies rash or suspicious lesions    Physical Examination  There were no vitals taken for this visit.    Constitutional: No acute distress, conversant  HEENT: Sclera anicteric, Pupils equal, round and reactive to light, extraocular motions intact, Oropharynx clear  Neck: No JVD, no carotid bruits  Cardiovascular: regular rate and rhythm, no murmur, rubs or gallops, normal S1/S2  Pulmonary: Clear to auscultation bilaterally  Abdominal: Abdomen soft, nontender, nondistended, positive bowel sounds  Extremities: No lower extremity edema,   Pulses:  Carotid pulses are 2+ on the right side, and 2+ on the left side.  Radial pulses are 2+ on the right side, and 2+ on the left side.   Femoral pulses are 2+ on the right side, and 2+ on the left side.  Popliteal pulses are 2+ on the right side, and 2+ on the left side.   Dorsalis pedis pulses are 2+ on the right side, and 2+ on the left side.   Posterior tibial pulses are 2+ on the right side, and 2+ on the left side.    Skin: No ecchymosis, erythema, or ulcers  Psych: Alert and oriented x 3, appropriate affect  Neuro: CNII-XII intact, no focal deficits    Labs:  Most Recent Data  CBC:   Lab Results   Component Value Date    WBC 8.65 08/02/2023    HGB 14.0 08/02/2023    HCT 43.5 08/02/2023     08/02/2023    MCV 86 08/02/2023    RDW 15.3 (H) 08/02/2023     BMP:   Lab  "Results   Component Value Date     08/02/2023    K 4.2 08/02/2023     08/02/2023    CO2 22 (L) 08/02/2023    BUN 32 (H) 08/02/2023    CREATININE 1.6 (H) 08/02/2023     (H) 08/02/2023    CALCIUM 10.0 08/02/2023     LFTS;   Lab Results   Component Value Date    PROT 8.1 08/02/2023    ALBUMIN 3.9 08/02/2023    BILITOT 0.8 08/02/2023    AST 23 08/02/2023    ALKPHOS 94 08/02/2023    ALT 14 08/02/2023     COAGS: No results found for: "INR", "PROTIME", "PTT"  FLP:   Lab Results   Component Value Date    CHOL 238 (H) 07/26/2023    HDL 66 07/26/2023    LDLCALC 146.8 07/26/2023    TRIG 126 07/26/2023    CHOLHDL 27.7 07/26/2023     CARDIAC:   Lab Results   Component Value Date     (H) 08/02/2023       Imaging:    BLE Venous Ultrasound 11/6/2023:  No evidence of deep venous thrombosis in either lower extremity.     Assessment/Plan:  Char Savage is a 83 y.o. female with HTN, HLD, DM2, CKD stage 3, obesity, who presents for an initial appointment.     Leg Swelling- Due to chronic venous insufficiency and BLE lymphedema.  Check JUANCARLOS study.  Refer to lymphedema clinic.  Decrease lasix to 20 mg bid.  Check cmp today and in 1 week.  Recommend wearing graduated compression hose.  Limit sodium intake to 2000 mg daily.  Limit volume intake to 1.5 L daily.  Elevate legs when resting.    2. HTN- Continue current medication.    3. HLD- Check lipids prior to next visit.  Plan to start meds next visit.  Pt with documented statin intolerance.     4. Obesity- Encourage diet, exercise, and weight loss.    Follow up in 5 months    Total duration of face to face visit time 45 minutes.  Total time spent counseling greater than fifty percent of total visit time.  Counseling included discussion regarding imaging findings, diagnosis, possibilities, treatment options, risks and benefits.  The patient had many questions regarding the options and long-term effects.    Handy Andres MD, PhD  Interventional " Cardiology

## 2023-11-17 NOTE — PATIENT INSTRUCTIONS
Assessment/Plan:  Char Savage is a 83 y.o. female with HTN, HLD, DM2, CKD stage 3, obesity, who presents for an initial appointment.     Leg Swelling- Due to chronic venous insufficiency and BLE lymphedema.  Check JUANCARLOS study.  Refer to lymphedema clinic.  Decrease lasix to 20 mg bid.  Check cmp today and in 1 week.  Recommend wearing graduated compression hose.  Limit sodium intake to 2000 mg daily.  Limit volume intake to 1.5 L daily.  Elevate legs when resting.    2. HTN- Continue current medication.    3. HLD- Check lipids prior to next visit.  Plan to start meds next visit.  Pt with documented statin intolerance.     4. Obesity- Encourage diet, exercise, and weight loss.    Follow up in 5 months

## 2023-11-21 ENCOUNTER — HOSPITAL ENCOUNTER (OUTPATIENT)
Dept: RADIOLOGY | Facility: HOSPITAL | Age: 83
Discharge: HOME | End: 2023-11-21
Attending: INTERNAL MEDICINE
Payer: MEDICARE

## 2023-11-21 VITALS — WEIGHT: 190 LBS | BODY MASS INDEX: 31.62 KG/M2

## 2023-11-21 DIAGNOSIS — Z12.31 BREAST CANCER SCREENING BY MAMMOGRAM: ICD-10-CM

## 2023-11-21 PROCEDURE — 77067 MAMMO DIGITAL SCREENING BILAT WITH TOMO: ICD-10-PCS | Mod: 26,,, | Performed by: RADIOLOGY

## 2023-11-21 PROCEDURE — 77063 MAMMO DIGITAL SCREENING BILAT WITH TOMO: ICD-10-PCS | Mod: 26,,, | Performed by: RADIOLOGY

## 2023-11-21 PROCEDURE — 77067 SCR MAMMO BI INCL CAD: CPT | Mod: 26,,, | Performed by: RADIOLOGY

## 2023-11-21 PROCEDURE — 77063 BREAST TOMOSYNTHESIS BI: CPT | Mod: 26,,, | Performed by: RADIOLOGY

## 2023-11-21 PROCEDURE — 77067 SCR MAMMO BI INCL CAD: CPT | Mod: TC

## 2023-11-24 ENCOUNTER — LAB VISIT (OUTPATIENT)
Dept: LAB | Facility: HOSPITAL | Age: 83
End: 2023-11-24
Attending: INTERNAL MEDICINE
Payer: MEDICARE

## 2023-11-24 DIAGNOSIS — R60.0 EDEMA OF BOTH LOWER EXTREMITIES: ICD-10-CM

## 2023-11-24 LAB
ALBUMIN SERPL BCP-MCNC: 3.8 G/DL (ref 3.5–5.2)
ALP SERPL-CCNC: 112 U/L (ref 55–135)
ALT SERPL W/O P-5'-P-CCNC: 14 U/L (ref 10–44)
ANION GAP SERPL CALC-SCNC: 13 MMOL/L (ref 8–16)
AST SERPL-CCNC: 21 U/L (ref 10–40)
BILIRUB SERPL-MCNC: 0.5 MG/DL (ref 0.1–1)
BUN SERPL-MCNC: 32 MG/DL (ref 8–23)
CALCIUM SERPL-MCNC: 9.8 MG/DL (ref 8.7–10.5)
CHLORIDE SERPL-SCNC: 102 MMOL/L (ref 95–110)
CO2 SERPL-SCNC: 26 MMOL/L (ref 23–29)
CREAT SERPL-MCNC: 1.9 MG/DL (ref 0.5–1.4)
EST. GFR  (NO RACE VARIABLE): 25.6 ML/MIN/1.73 M^2
GLUCOSE SERPL-MCNC: 92 MG/DL (ref 70–110)
POTASSIUM SERPL-SCNC: 5 MMOL/L (ref 3.5–5.1)
PROT SERPL-MCNC: 8 G/DL (ref 6–8.4)
SODIUM SERPL-SCNC: 141 MMOL/L (ref 136–145)

## 2023-11-24 PROCEDURE — 80053 COMPREHEN METABOLIC PANEL: CPT | Performed by: INTERNAL MEDICINE

## 2023-11-24 PROCEDURE — 36415 COLL VENOUS BLD VENIPUNCTURE: CPT | Mod: PO | Performed by: INTERNAL MEDICINE

## 2023-11-29 ENCOUNTER — HOSPITAL ENCOUNTER (OUTPATIENT)
Dept: CARDIOLOGY | Facility: HOSPITAL | Age: 83
Discharge: HOME | End: 2023-11-29
Attending: INTERNAL MEDICINE
Payer: MEDICARE

## 2023-11-29 DIAGNOSIS — R60.0 EDEMA OF BOTH LOWER EXTREMITIES: ICD-10-CM

## 2023-11-29 LAB
LEFT ABI: 1.14
LEFT ARM BP: 155 MMHG
LEFT DORSALIS PEDIS: 162 MMHG
LEFT POSTERIOR TIBIAL: 177 MMHG
RIGHT ABI: 1.13
RIGHT ARM BP: 141 MMHG
RIGHT DORSALIS PEDIS: 175 MMHG
RIGHT POSTERIOR TIBIAL: 163 MMHG

## 2023-11-29 PROCEDURE — 93922 UPR/L XTREMITY ART 2 LEVELS: CPT | Mod: 26,,, | Performed by: INTERNAL MEDICINE

## 2023-11-29 PROCEDURE — 93922 ANKLE BRACHIAL INDICES (ABI): ICD-10-PCS | Mod: 26,,, | Performed by: INTERNAL MEDICINE

## 2023-11-29 PROCEDURE — 93922 UPR/L XTREMITY ART 2 LEVELS: CPT

## 2023-12-01 ENCOUNTER — HOSPITAL ENCOUNTER (OUTPATIENT)
Dept: CARDIOLOGY | Facility: HOSPITAL | Age: 83
Discharge: HOME | End: 2023-12-01
Attending: INTERNAL MEDICINE
Payer: MEDICARE

## 2023-12-01 DIAGNOSIS — R60.0 EDEMA OF BOTH LOWER EXTREMITIES: ICD-10-CM

## 2023-12-01 PROCEDURE — 93970 CV US DOPPLER VENOUS LEGS BILATERAL (CUPID ONLY): ICD-10-PCS | Mod: 26,,, | Performed by: INTERNAL MEDICINE

## 2023-12-01 PROCEDURE — 93970 EXTREMITY STUDY: CPT

## 2023-12-01 PROCEDURE — 93970 EXTREMITY STUDY: CPT | Mod: 26,,, | Performed by: INTERNAL MEDICINE

## 2023-12-05 ENCOUNTER — CLINICAL SUPPORT (OUTPATIENT)
Dept: REHABILITATION | Facility: HOSPITAL | Age: 83
End: 2023-12-05
Payer: MEDICARE

## 2023-12-05 DIAGNOSIS — I89.0 LYMPHEDEMA OF BOTH LOWER EXTREMITIES: ICD-10-CM

## 2023-12-05 PROCEDURE — 97535 SELF CARE MNGMENT TRAINING: CPT

## 2023-12-05 PROCEDURE — 97166 OT EVAL MOD COMPLEX 45 MIN: CPT

## 2023-12-05 NOTE — PLAN OF CARE
"[facility]  Occupational Therapy Initial Evaluation  Lymphedema      Name: Char Savage  Clinic Number: [id number]    Therapy Diagnosis:   Encounter Diagnosis   Name Primary?    Lymphedema of both lower extremities      Physician: Handy Andres MD*    Physician Orders: Eval and Treat  Medical Diagnosis: I89.0 (ICD-10-CM) - Lymphedema, not elsewhere classified  Evaluation Date: 12/5/2023  Insurance Authorization Period Expiration: 1/30/2024  Plan of Care Certification Period: 2/27/2024q  Visit # / Visits authorized: 1 / 1  FOTO: see media, 1 / 3    Precautions:  Standard and Fall    Time In:10:00  Time Out: 11:00  Total Appointment Time (timed & untimed codes): 60 minutes    Subjective      Date of onset: 2019  History of current condition - Char reports: swelling and venous ulcers developed in 2019. She has not had an ulcer in a few months. She tried wearing compression stockings that she purchased at [employer] but stopped due to donning difficulty. Denies improvements in swelling with elevation. Worsening factors include dependency. Edema has prevented ability to wear proper footwear. She takes lasix BID.       Previous Lymphedema Therapy: no  Wears Compression: no       Imaging:     CV Ultrasound doppler venous legs bilat  Conclusion    There is no evidence of a left lower extremity DVT.    The right superficial femoral middle vein is normal.    The left superficial femoral middle vein is normal.    CV Resting ABICV Resting JUANCARLOS  Conclusion    Normal resting ABIs.    Normal waveforms.    Pain:  Functional Pain Scale Rating 0-10:   6/10 on average  4/10 at best  9/10 at worst  Location: feet, knees, lower legs, hips, back  Description: Burning, Throbbing, and "itching"  Aggravating Factors: nothing stated  Easing Factors: corticoid steroid ointment     Occupation:  retired    Functional Limitations/Social History:    Previous functional status includes: Independent with all ADLs. "   Recent falls: yes, two weeks ago, tripped going to a 's visit. Medical team made aware.     Current Functional Status   Home/Living environment: lives alone         Limitation of Functional Status as follows:   ADLs/IADLs:     - Feeding: none    - Bathing: none    - Dressing/Grooming: none    - Home Management: none    - Driving: none      Patient's Goals for Therapy: improve swelling    Past Medical History/Physical Systems Review:   Char Savage  has a past medical history of Benign hypertension with chronic kidney disease, stage III, Hyperlipidemia, Hypertension, Left eye injury, Nevus of choroid, Osteopenia, Type 2 diabetes mellitus with stage 3 chronic kidney disease, without long-term current use of insulin, Type 2 diabetes mellitus without complication, and Venous stasis dermatitis of both lower extremities.    Char Savage  has a past surgical history that includes Cataract extraction (1993/2000); Orbital fracture repair (12/1998); Colonoscopy; Partial hysterectomy; TONSILLECTOMY, ADENOIDECTOMY; Appendectomy; Rib fracture surgery; Hysterectomy (07/1972); and Breast biopsy (Bilateral).    Char has a current medication list which includes the following prescription(s): aspirin, furosemide, ketoprofen, micronized, lisinopril, lorazepam, metoprolol tartrate, multivitamin, omeprazole, and triamcinolone acetonide 0.1%.    Review of patient's allergies indicates:   Allergen Reactions    Amoxicillin Other (See Comments)    Bactrim [sulfamethoxazole-trimethoprim] Other (See Comments)    Darvon [propoxyphene] Other (See Comments)    Statins-hmg-coa reductase inhibitors      Flu symptoms    Toradol [ketorolac] Other (See Comments)    Vibramycin [doxycycline calcium] Rash        Pt denies: CHF, DVT, CA, infection, severe PAD  Pt admits medically managed/treated: as per EMR, CKD3- on lasix     Objective      Patient received from waiting room, ambulating with cane.   Mental status: alert, oriented to  person, place, and time  Appearance: Well groomed  Behavior:  calm and cooperative  Attention Span and Concentration:  Normal    Affected Areas: RLE and LLE  Refill: Day   Stemmer Sign: negative  Shape: increased girth along ankles  Tissue Texture: firm, pitting with prolonged pressure  Skin Integrity: erythema  Sensation: intact  Circulation: intact      LE Girth Measurements: taken in supine  LANDMARK RIGHT LE  12/5 LEFT LE  12/5   SBP - -   10 below SBP 43.0 cm 43.5 cm   20 below SBP 41.0 cm 41.0 cm   30 below SBP 33.0 cm 34.5 cm   35 below SBP 26.5 cm 28.0 cm   Ankle 30.0 cm 30.0 cm   Forefoot 24.5 cm 25.5 cm       Picture of BLE in supine; taken via [employer] on therapist's cell phone with verbal consent from patient:      Limitation/Restriction for FOTO Lower Quadrant Edema Survey    Therapist reviewed FOTO scores for Char Savage on 12/5/2023.   FOTO documents entered into "[employer]" - see Media section.    Limitation Score: 43%         Treatment      Total Treatment time (time-based codes) separate from Evaluation: 20 minutes    Char received the treatments listed below:        Self-care/home management techniques to improve functional performance for 20  minutes, including:  Pt was educated in potential compression needs.  Demo of products including socks, garments, and Inelastic Velcro wraps.   Discussed cost/coverage and authorization per insurance with Durable Medical Equipment(DME) provider.  Compression require orders from referring provider and coverage or purchase of products from DME or self order.      Discussed wear schedule, don/doff, wash and management of products.  Size and compression class and AM/PM needs.    Consideration for Edema Wear as form of temporary compression use until securing compression needs.   Consideration for shorts/tights or capris style compression to compliment lower leg compression to support size/shape of LE.   Product information provided.   Vendor list  provided.    Informed insurance coverage of compression is per DME provider and typically Medicare and Medicare group plans may not cover cost beyond pair of standard sized knee high garments.   Commitment to attendance as well as commitment to securing compression needs is critical to edema management.     Patient Education and Home Exercises      Education provided:   1. Educated on definition of lymphedema.  2. Explained the Complete Decongestive Therapy protocol in depth  3. Educated on Phase 1 and 2 of protocol.  4. Reviewed treatment frequency and likely duration of weeks  5.Contraindications for treatment.  6. Plan of care and goals.  7. Educated on home management protocols.   8. Role of OT, goals for OT, scheduling/cancellations, insurance limitations.  9. Provided lymphedema educational pamphlet      Written Home Exercises Provided: yes.  Exercises were reviewed and Char was able to demonstrate them prior to the end of the session.    Char demonstrated good  understanding of the education provided.     Pt was advised to perform these exercises in compression, free of pain, and to stop performing them if pain occurs.    See EMR under Patient Instructions for exercises provided 12/5/2023.    Assessment      Char is a 83 y.o. female referred to outpatient Occupational Therapy with a medical diagnosis of lymphedema. This patient presents with stage 1 lymphedema due to ? CVI.  Patient's deficits include swelling of the BLE, increased pain, increased stiffness in the BLE, as well as difficulty performing ADLs , and placing the pt at higher risk of infection.  Therapist's recommendation includes elevation, exercise, manual lymphatic drainage, pneumatic compression pump, multi-layer bandaging, and compression garments for 6 weeks to reduce swelling. Char would benefit from complete decongestive therapy to reduce size of BLE, reduce risk of wounds and poor skin integrity as well as education to  self-maintain reduction with use of compression garments.    Presented with stage 1 lymphedema of BLE. Pt lives alone and had difficulty donning compression stockings in the past. Provided edema wear size medium as temporary form of compression. Pt demonstrated independence donning. Also provided product information for sport style 20-30 mm hg stockings to assess donning abilities. If pt is unable to jorge, plan for zipper 20-30 mm hg or 15-20 mm hg stockings. All questions and concerns addressed.     Anticipated barriers to occupational therapy: none    Plan of care discussed with patient: Yes  Patient's spiritual, cultural and educational needs considered and patient is agreeable to the plan of care and goals as stated below:     Medical Necessity is demonstrated by the following  Occupational Profile/History  Co-morbidities and personal factors that may impact the plan of care [] LOW: Brief chart review  [x] MODERATE: Expanded chart review   [] HIGH: Extensive chart review    Moderate / High Support Documentation: CKD3, DM2, venous stasis dermatitis, lives alone     Examination  Performance deficits relating to physical, cognitive or psychosocial skills that result in activity limitations and/or participation restrictions  [] LOW: addressing 1-3 Performance deficits  [x] MODERATE: 3-5 Performance deficits  [] HIGH: 5+ Performance deficits (please support below)    Moderate / High Support Documentation:    Physical:  Joint Mobility  Muscle Power/Strength  Skin Integrity/Scar Formation  Edema  Pain    Cognitive:  No Deficits    Psychosocial:    No Deficits     Treatment Options [] LOW: Limited options  [x] MODERATE: Several options  [] HIGH: Multiple options      Decision Making/ Complexity Score: moderate       The following goals were discussed with the patient and patient is in agreement with them as to be addressed in the treatment plan.     Goals:     Short Term Goals: (3 weeks)  Goals: Progressing / MET   1.  Patient will demonstrate 100% knowledge of lymphedema precautions and signs of infection to allow for reduced lymphedema risk, infection risk, and/or exacerbation of condition.  NOT MET   2. Patient will tolerate daily activities wearing multilayered bandaging to allow for lymphatic drainage support, skin elasticity, and reduction in shape and size of limb.  NOT MET   3. Patient and/or caregiver will order/obtain appropriate compression garments to maintain lymphatic and venous support.  NOT MET   4. Patient will show decreased total girth measurement in BLE by up to 2 cm to allow for lower extremity symmetry, shoe and clothing choice, and ability to apply needed compression. NOT MET     Long Term Goals: (6 weeks)  Goals: Progressing / MET   1. Patient and/or caregiver to jorge/doff compression garment independently and with daily compliance to assist in lymphedema management, skin elasticity, and tissue density NOT MET   2. Patient and/or caregiver will be independent in use of pneumatic compression pump or self manual lymphatic drainage techniques to areas within reach to enhance lymphatic drainage and skin condition.  NOT MET   3. Patient to be independent and compliant with home exercise program to allow for increased function in affected limb. NOT MET   4. Patient will show decreased total girth measurement in BLE by up to 3 cm  to allow for lower extremity symmetry, shoe and clothing choice, and ability to apply needed compression daily. NOT MET        Plan     Plan of Care Certification: 12/5/2023 to 2/27/2024.     Therapy Track: COMPLETE DECONGESTIVE THERAPY  Interventions: manual lymphatic drainage, multi-layer bandaging, compression garments, therapeutic exercise, self bandaging and manual lymphatic drainage training, home exercise programming.      Outpatient Occupational Therapy 2 times weekly for 6 weeks to include the following interventions: Manual therapy/joint mobilizations, Therapeutic  exercises/activities., and Edema Control.    Genesis Ordonez, OT, CLWT      I CERTIFY THE NEED FOR THESE SERVICES FURNISHED UNDER THIS PLAN OF TREATMENT AND WHILE UNDER MY CARE   Physician's comments:     Physician's Signature: ___________________________________________________

## 2023-12-11 ENCOUNTER — PATIENT OUTREACH (OUTPATIENT)
Dept: ADMINISTRATIVE | Facility: HOSPITAL | Age: 83
End: 2023-12-11
Payer: MEDICARE

## 2023-12-11 ENCOUNTER — TELEPHONE (OUTPATIENT)
Dept: FAMILY MEDICINE | Facility: CLINIC | Age: 83
End: 2023-12-11
Payer: MEDICARE

## 2023-12-11 VITALS — SYSTOLIC BLOOD PRESSURE: 115 MMHG | DIASTOLIC BLOOD PRESSURE: 60 MMHG

## 2023-12-19 ENCOUNTER — CLINICAL SUPPORT (OUTPATIENT)
Dept: REHABILITATION | Facility: HOSPITAL | Age: 83
End: 2023-12-19
Payer: MEDICARE

## 2023-12-19 ENCOUNTER — DOCUMENTATION ONLY (OUTPATIENT)
Dept: CARDIOLOGY | Facility: CLINIC | Age: 83
End: 2023-12-19
Payer: MEDICARE

## 2023-12-19 DIAGNOSIS — I89.0 LYMPHEDEMA OF BOTH LOWER EXTREMITIES: Primary | ICD-10-CM

## 2023-12-19 PROCEDURE — 97140 MANUAL THERAPY 1/> REGIONS: CPT

## 2023-12-19 NOTE — PROGRESS NOTES
Patient has been compliant with compression, elevation and exercise for at least 4 weeks yet swelling persists.

## 2023-12-19 NOTE — PROGRESS NOTES
[facility]  Occupational Therapy Treatment Note  Lymphedema    Date: 12/19/2023  Name: Char Savage  Clinic Number: [id number]    Therapy Diagnosis:   Encounter Diagnosis   Name Primary?    Lymphedema of both lower extremities Yes     Physician: Handy Andres MD*    Physician Orders: Eval and Treat  Medical Diagnosis: I89.0 (ICD-10-CM) - Lymphedema, not elsewhere classified  Evaluation Date: 12/5/2023  Insurance Authorization Period Expiration: 12/31/2023  Plan of Care Certification Period: 2/27/2024  Visit # / Visits authorized: 1 / 23  FOTO: see media, 1 / 3     Precautions:  Standard and Fall       Time In: 9:00  Time Out: 9:54  Total Billable Time: 54 minutes    SUBJECTIVE     Pt reports: she ordered the compression stockings which are scheduled to arrive today. She has worn edema wear daily that has improved swelling but still persist. Paresthesia and aching persist. swelling improved.   Char reported wearing compression outside of therapy visits: Yes - edema wear size medium BLE  She was compliant with home exercise program given last session.   Response to previous treatment:N/A  Functional change: none    Pain: 0/10        OBJECTIVE     Pt arrived wearing edema wear on BLE, ambulating with cane, in NAD.     Compression garment status: purchased 20-30 mm hg zipper stockings  Compression Rx status: not requested  Pneumatic pump status: not initiated     Objective Measures updated at progress report unless specified.    Treatment     Char received the treatments listed below:     Manual therapy techniques were applied for 54 minutes, including:    MANUAL LYMPHATIC DRAINAGE (MLD):    While supine with LEs elevated stimulation at terminus, along GI region, B inguinal regions, drainage of entire BLE windy lower leg, ankle, and foot with return proximally,  Use of Aquaphor due to dryness.     MULTILAYERED BANDAGING:    Issued supplies and bandaged BLE   Use of Cavilon  moisturizer for dry skin  Cotton stockinet: size 9  Cellona padding from dorsum of foot to knee,   2-6cm 2-8cm and 2-10cm Rosidal K rolls foot to distal knee  Elastomull to toes    Pt to leave intact 24 hrs as tolerated, discontinue with any problems, return rolled bandages next session. Wash and wear schedules confirmed.       Patient Education and Home Exercises      Education provided:   - Progress towards goals     Written Home Exercises Provided:  not today .  Exercises were reviewed and Char was able to demonstrate them prior to the end of the session.  Char demonstrated good  understanding of the HEP provided. See EMR under Patient Instructions for exercises provided during therapy sessions.       Assessment     Edema improved though persists since wearing edema wear daily. 20-30 mm hg stockings scheduled to arrive today.    Pt would continue to benefit from skilled OT.      Char is progressing well towards her goals and there are no updates to goals at this time. Pt prognosis is Good.     Pt will continue to benefit from skilled outpatient occupational therapy to address the deficits listed in the problem list on initial evaluation provide pt/family education and to maximize pt's level of independence in the home and community environment.     Pt's spiritual, cultural and educational needs considered and pt agreeable to plan of care and goals.    Anticipated barriers to occupational therapy: none    Goals:    Short Term Goals: (3 weeks)  Goals: Progressing / MET   1. Patient will demonstrate 100% knowledge of lymphedema precautions and signs of infection to allow for reduced lymphedema risk, infection risk, and/or exacerbation of condition.  NOT MET   2. Patient will tolerate daily activities wearing multilayered bandaging to allow for lymphatic drainage support, skin elasticity, and reduction in shape and size of limb.  NOT MET   3. Patient and/or caregiver will order/obtain appropriate compression  garments to maintain lymphatic and venous support.  NOT MET   4. Patient will show decreased total girth measurement in BLE by up to 2 cm to allow for lower extremity symmetry, shoe and clothing choice, and ability to apply needed compression. NOT MET      Long Term Goals: (6 weeks)  Goals: Progressing / MET   1. Patient and/or caregiver to jorge/doff compression garment independently and with daily compliance to assist in lymphedema management, skin elasticity, and tissue density NOT MET   2. Patient and/or caregiver will be independent in use of pneumatic compression pump or self manual lymphatic drainage techniques to areas within reach to enhance lymphatic drainage and skin condition.  NOT MET   3. Patient to be independent and compliant with home exercise program to allow for increased function in affected limb. NOT MET   4. Patient will show decreased total girth measurement in BLE by up to 3 cm  to allow for lower extremity symmetry, shoe and clothing choice, and ability to apply needed compression daily. NOT MET          PLAN     Updates/Grading for next session: none    Genesis Ordonez, OT, CLWT

## 2023-12-21 ENCOUNTER — CLINICAL SUPPORT (OUTPATIENT)
Dept: REHABILITATION | Facility: HOSPITAL | Age: 83
End: 2023-12-21
Payer: MEDICARE

## 2023-12-21 DIAGNOSIS — I89.0 LYMPHEDEMA OF BOTH LOWER EXTREMITIES: Primary | ICD-10-CM

## 2023-12-21 PROCEDURE — 97140 MANUAL THERAPY 1/> REGIONS: CPT

## 2023-12-21 PROCEDURE — 97535 SELF CARE MNGMENT TRAINING: CPT

## 2023-12-21 NOTE — PROGRESS NOTES
[facility]  Occupational Therapy Treatment Note  Lymphedema    Date: 12/21/2023  Name: Char Savage  Clinic Number: [id number]    Therapy Diagnosis:   Encounter Diagnosis   Name Primary?    Lymphedema of both lower extremities Yes       Physician: Handy Andres MD*    Physician Orders: Eval and Treat  Medical Diagnosis: I89.0 (ICD-10-CM) - Lymphedema, not elsewhere classified  Evaluation Date: 12/5/2023  Insurance Authorization Period Expiration: 12/31/2023  Plan of Care Certification Period: 2/27/2024  Visit # / Visits authorized: 2 / 23  FOTO: see media, 1 / 3     Precautions:  Standard and Fall       Time In: 2:29  Time Out: 3:26  Total Billable Time: 57 minutes    SUBJECTIVE     Pt reports: bandages were comfortable. 20-30 mm hg knee highs arrived but she has not tried them on yet.  Char reported wearing compression outside of therapy visits: Yes - edema wear size medium BLE  She was compliant with home exercise program given last session.   Response to previous treatment:N/A  Functional change: none    Pain: 0/10        OBJECTIVE     Pt arrived wearing edema wear on BLE, ambulating with cane, in NAD.     Compression garment status: purchased 20-30 mm hg stockings  Compression Rx status: not requested  Pneumatic pump status: not initiated     Objective Measures updated at progress report unless specified.    Treatment     Char received the treatments listed below:     Self-care/home management techniques were completed for 47 minutes, including:    Practiced donning/doffing 20-30 mm hg stockings x 4 repetitions  Practiced donning/doffing edema wear size small with sock aid- demonstrated independence   Fitted for and provided product information for zipper 20-30 mm hg stockings    Manual therapy techniques were applied for 10 minutes, including:    MULTILAYERED BANDAGING:    Issued supplies and bandaged BLE   Use of Cavilon moisturizer for dry skin  Cotton stockinet:  size 9  Cellona padding from dorsum of foot to knee,   2-6cm 2-8cm and 2-10cm Rosidal K rolls foot to distal knee  Elastomull to toes    Pt to leave intact 24-48 hrs as tolerated, discontinue with any problems, return rolled bandages next session. Wash and wear schedules confirmed.       Patient Education and Home Exercises      Education provided:   - Progress towards goals     Written Home Exercises Provided:  not today .  Exercises were reviewed and Char was able to demonstrate them prior to the end of the session.  Char demonstrated good  understanding of the HEP provided. See EMR under Patient Instructions for exercises provided during therapy sessions.       Assessment     Session focused on donning/doffing garments as pt has great difficulty and lives alone. 20-30 mm hg stockings too difficult despite use of sockaid. Provided edema wear size small instead of medium to maximize containment. Pt to purchase 20-30 mm hg zipper stockings to assess donning abilities. Edema improving.     Pt would continue to benefit from skilled OT.      Char is progressing well towards her goals and there are no updates to goals at this time. Pt prognosis is Good.     Pt will continue to benefit from skilled outpatient occupational therapy to address the deficits listed in the problem list on initial evaluation provide pt/family education and to maximize pt's level of independence in the home and community environment.     Pt's spiritual, cultural and educational needs considered and pt agreeable to plan of care and goals.    Anticipated barriers to occupational therapy: none    Goals:    Short Term Goals: (3 weeks)  Goals: Progressing / MET   1. Patient will demonstrate 100% knowledge of lymphedema precautions and signs of infection to allow for reduced lymphedema risk, infection risk, and/or exacerbation of condition.  NOT MET   2. Patient will tolerate daily activities wearing multilayered bandaging to allow for  lymphatic drainage support, skin elasticity, and reduction in shape and size of limb.  NOT MET   3. Patient and/or caregiver will order/obtain appropriate compression garments to maintain lymphatic and venous support.  NOT MET   4. Patient will show decreased total girth measurement in BLE by up to 2 cm to allow for lower extremity symmetry, shoe and clothing choice, and ability to apply needed compression. NOT MET      Long Term Goals: (6 weeks)  Goals: Progressing / MET   1. Patient and/or caregiver to jorge/doff compression garment independently and with daily compliance to assist in lymphedema management, skin elasticity, and tissue density NOT MET   2. Patient and/or caregiver will be independent in use of pneumatic compression pump or self manual lymphatic drainage techniques to areas within reach to enhance lymphatic drainage and skin condition.  NOT MET   3. Patient to be independent and compliant with home exercise program to allow for increased function in affected limb. NOT MET   4. Patient will show decreased total girth measurement in BLE by up to 3 cm  to allow for lower extremity symmetry, shoe and clothing choice, and ability to apply needed compression daily. NOT MET          PLAN     Updates/Grading for next session: none    Genesis Ordonez, OT, CLWT

## 2024-01-03 ENCOUNTER — CLINICAL SUPPORT (OUTPATIENT)
Dept: REHABILITATION | Facility: HOSPITAL | Age: 84
End: 2024-01-03
Payer: MEDICARE

## 2024-01-03 DIAGNOSIS — I89.0 LYMPHEDEMA OF BOTH LOWER EXTREMITIES: Primary | ICD-10-CM

## 2024-01-03 PROCEDURE — 97535 SELF CARE MNGMENT TRAINING: CPT

## 2024-01-03 PROCEDURE — 97140 MANUAL THERAPY 1/> REGIONS: CPT

## 2024-01-03 NOTE — PROGRESS NOTES
[facility]  Occupational Therapy Treatment Note  Lymphedema    Date: 1/3/2024  Name: Char Savage  Clinic Number: [id number]    Therapy Diagnosis:   Encounter Diagnosis   Name Primary?    Lymphedema of both lower extremities Yes       Physician: Handy Andres MD*    Physician Orders: Eval and Treat  Medical Diagnosis: I89.0 (ICD-10-CM) - Lymphedema, not elsewhere classified  Evaluation Date: 12/5/2023  Insurance Authorization Period Expiration: 1/1/2025  Plan of Care Certification Period: 2/27/2024  Visit # / Visits authorized: 1 / 20  FOTO: see media, 2 / 3     Precautions:  Standard and Fall       Time In: 11:00  Time Out: 12:00  Total Billable Time: 60 minutes    SUBJECTIVE     Pt reports: wore edema wear size small daily, zipper stockings arrived but she has not tried them on. Edema is improving.   Char reported wearing compression outside of therapy visits: Yes - edema wear size small BLE  She was compliant with home exercise program given last session.   Response to previous treatment:  Functional change: none    Pain: 0/10        OBJECTIVE     Pt arrived wearing edema wear on BLE, ambulating with cane, in NAD.     Compression garment status: purchased 20-30 mm hg zipper stockings, edema wear size small and medium.  Compression Rx status: not requested  Pneumatic pump status: not initiated     LE Girth Measurements: taken in supine  LANDMARK RIGHT LE  12/5 LEFT LE  12/5 RIGHT LE  1/3 LEFT LE  1/3   SBP - - - -   10 below SBP 43.0 cm 43.5 cm 43.5 cm 45.0 cm   20 below SBP 41.0 cm 41.0 cm 39.0 cm 39.5 cm   30 below SBP 33.0 cm 34.5 cm 31.0 cm 33.5 cm   35 below SBP 26.5 cm 28.0 cm 25.0 cm 26.0 cm   Ankle 30.0 cm 30.0 cm 28.5 cm 29.5 cm   Forefoot 24.5 cm 25.5 cm 24.0 cm 23.5 cm         Treatment     Char received the treatments listed below:     Self-care/home management techniques were completed for 30 minutes, including:    Practiced donning/doffing 20-30 mm hg  zipper stockings x 2 repetitions, increased time and effort required  Fitted for and provided product information for 15-20 mm hg stockings    Manual therapy techniques were applied for 30 minutes, including:    MANUAL LYMPHATIC DRAINAGE (MLD):    While supine with LEs elevated stimulation along GI region, B inguinal regions, drainage of entire B LE windy lower leg, ankle, and foot with return proximally,  Use of Aquaphor due to dryness.       MULTILAYERED BANDAGING:    Issued supplies and bandaged BLE   Use of Cavilon moisturizer for dry skin  Cotton stockinet: size 9  Cellona padding from dorsum of foot to knee,   2-6cm 2-8cm and 2-10cm Rosidal K rolls foot to distal knee  Elastomull to toes    Pt to leave intact 24-48 hrs as tolerated, discontinue with any problems, return rolled bandages next session. Wash and wear schedules confirmed.       Patient Education and Home Exercises      Education provided:   - Progress towards goals     Written Home Exercises Provided:  not today .  Exercises were reviewed and Char was able to demonstrate them prior to the end of the session.  Char demonstrated good  understanding of the HEP provided. See EMR under Patient Instructions for exercises provided during therapy sessions.       Assessment     Unable to jorge zipper 20-30 mm hg stockings. Best option is 15-20 mm hg given ability to jorge. Provided product information for garment to self purchase. Edema improving.     Pt would continue to benefit from skilled OT.      Char is progressing well towards her goals and there are no updates to goals at this time. Pt prognosis is Good.     Pt will continue to benefit from skilled outpatient occupational therapy to address the deficits listed in the problem list on initial evaluation provide pt/family education and to maximize pt's level of independence in the home and community environment.     Pt's spiritual, cultural and educational needs considered and pt agreeable to plan  of care and goals.    Anticipated barriers to occupational therapy: none    Goals:    Short Term Goals: (3 weeks)  Goals: Progressing / MET   1. Patient will demonstrate 100% knowledge of lymphedema precautions and signs of infection to allow for reduced lymphedema risk, infection risk, and/or exacerbation of condition.  MET   2. Patient will tolerate daily activities wearing multilayered bandaging to allow for lymphatic drainage support, skin elasticity, and reduction in shape and size of limb.  MET   3. Patient and/or caregiver will order/obtain appropriate compression garments to maintain lymphatic and venous support.  NOT MET   4. Patient will show decreased total girth measurement in BLE by up to 2 cm to allow for lower extremity symmetry, shoe and clothing choice, and ability to apply needed compression. NOT MET      Long Term Goals: (6 weeks)  Goals: Progressing / MET   1. Patient and/or caregiver to jorge/doff compression garment independently and with daily compliance to assist in lymphedema management, skin elasticity, and tissue density NOT MET   2. Patient and/or caregiver will be independent in use of pneumatic compression pump or self manual lymphatic drainage techniques to areas within reach to enhance lymphatic drainage and skin condition.  NOT MET   3. Patient to be independent and compliant with home exercise program to allow for increased function in affected limb. NOT MET   4. Patient will show decreased total girth measurement in BLE by up to 3 cm  to allow for lower extremity symmetry, shoe and clothing choice, and ability to apply needed compression daily. NOT MET          PLAN     Updates/Grading for next session: none    Genesis Ordonez, OT, CLWT

## 2024-01-10 ENCOUNTER — CLINICAL SUPPORT (OUTPATIENT)
Dept: REHABILITATION | Facility: HOSPITAL | Age: 84
End: 2024-01-10
Payer: MEDICARE

## 2024-01-10 DIAGNOSIS — I89.0 LYMPHEDEMA OF BOTH LOWER EXTREMITIES: Primary | ICD-10-CM

## 2024-01-10 PROCEDURE — 97140 MANUAL THERAPY 1/> REGIONS: CPT

## 2024-01-10 NOTE — PROGRESS NOTES
[facility]  Occupational Therapy Treatment Note  Lymphedema    Date: 1/10/2024  Name: Char Savage  Clinic Number: [id number]    Therapy Diagnosis:   Encounter Diagnosis   Name Primary?    Lymphedema of both lower extremities Yes       Physician: Handy Andres MD*    Physician Orders: Eval and Treat  Medical Diagnosis: I89.0 (ICD-10-CM) - Lymphedema, not elsewhere classified  Evaluation Date: 12/5/2023  Insurance Authorization Period Expiration: 1/1/2025  Plan of Care Certification Period: 2/27/2024  Visit # / Visits authorized: 2 / 20  FOTO: see media, 2 / 3     Precautions:  Standard and Fall       Time In: 11:00  Time Out: 11:53  Total Billable Time: 53 minutes    SUBJECTIVE     Pt reports: new stockings have not arrived yet. No concerns.   Char reported wearing compression outside of therapy visits: Yes - edema wear size small BLE  She was compliant with home exercise program given last session.   Response to previous treatment:  Functional change: none    Pain: 0/10        OBJECTIVE     Pt arrived wearing edema wear on BLE, ambulating with cane, in NAD.     Compression garment status: purchased 20-30 mm hg zipper stockings, edema wear size small and medium.  Compression Rx status: not requested  Pneumatic pump status: not initiated     LE Girth Measurements: taken in supine  LANDMARK RIGHT LE  12/5 LEFT LE  12/5 RIGHT LE  1/3 LEFT LE  1/3 RIGHT LE  1/10 LEFT LE  1/10   SBP - - - - - -   10 below SBP 43.0 cm 43.5 cm 43.5 cm 45.0 cm 41.5 cm 42.0 cm   20 below SBP 41.0 cm 41.0 cm 39.0 cm 39.5 cm 38.5 cm 39.0 cm   30 below SBP 33.0 cm 34.5 cm 31.0 cm 33.5 cm 30.5 cm 32.5 cm   35 below SBP 26.5 cm 28.0 cm 25.0 cm 26.0 cm 25.0 cm 24.5 cm   Ankle 30.0 cm 30.0 cm 28.5 cm 29.5 cm 28.0 cm 28.5 cm   Forefoot 24.5 cm 25.5 cm 24.0 cm 23.5 cm 24.0 cm 24.0 cm         Treatment     Char received the treatments listed below:     Manual therapy techniques were applied for 53 minutes,  including:    MANUAL LYMPHATIC DRAINAGE (MLD):    While supine with LEs elevated stimulation along GI region, B inguinal regions, drainage of entire B LE windy lower leg, ankle, and foot with return proximally,  Use of Aquaphor due to dryness.       MULTILAYERED BANDAGING:    Issued supplies and bandaged BLE   Use of Cavilon moisturizer for dry skin  Cotton stockinet: size 9  Cellona padding from dorsum of foot to knee,   2-6cm 2-8cm and 2-10cm Rosidal K rolls foot to distal knee  Elastomull to toes    Pt to leave intact 24-48 hrs as tolerated, discontinue with any problems, return rolled bandages next session. Wash and wear schedules confirmed.       Patient Education and Home Exercises      Education provided:   - Progress towards goals     Written Home Exercises Provided:  not today .  Exercises were reviewed and Char was able to demonstrate them prior to the end of the session.  Char demonstrated good  understanding of the HEP provided. See EMR under Patient Instructions for exercises provided during therapy sessions.       Assessment     No changes. Continue POC as above.     Pt would continue to benefit from skilled OT.      Char is progressing well towards her goals and there are no updates to goals at this time. Pt prognosis is Good.     Pt will continue to benefit from skilled outpatient occupational therapy to address the deficits listed in the problem list on initial evaluation provide pt/family education and to maximize pt's level of independence in the home and community environment.     Pt's spiritual, cultural and educational needs considered and pt agreeable to plan of care and goals.    Anticipated barriers to occupational therapy: none    Goals:    Short Term Goals: (3 weeks)  Goals: Progressing / MET   1. Patient will demonstrate 100% knowledge of lymphedema precautions and signs of infection to allow for reduced lymphedema risk, infection risk, and/or exacerbation of condition.  MET   2.  Patient will tolerate daily activities wearing multilayered bandaging to allow for lymphatic drainage support, skin elasticity, and reduction in shape and size of limb.  MET   3. Patient and/or caregiver will order/obtain appropriate compression garments to maintain lymphatic and venous support.  NOT MET   4. Patient will show decreased total girth measurement in BLE by up to 2 cm to allow for lower extremity symmetry, shoe and clothing choice, and ability to apply needed compression. NOT MET      Long Term Goals: (6 weeks)  Goals: Progressing / MET   1. Patient and/or caregiver to jorge/doff compression garment independently and with daily compliance to assist in lymphedema management, skin elasticity, and tissue density NOT MET   2. Patient and/or caregiver will be independent in use of pneumatic compression pump or self manual lymphatic drainage techniques to areas within reach to enhance lymphatic drainage and skin condition.  NOT MET   3. Patient to be independent and compliant with home exercise program to allow for increased function in affected limb. NOT MET   4. Patient will show decreased total girth measurement in BLE by up to 3 cm  to allow for lower extremity symmetry, shoe and clothing choice, and ability to apply needed compression daily. NOT MET          PLAN     Updates/Grading for next session: none    Genesis Ordonez, OT, CLWT

## 2024-01-17 ENCOUNTER — CLINICAL SUPPORT (OUTPATIENT)
Dept: REHABILITATION | Facility: HOSPITAL | Age: 84
End: 2024-01-17
Payer: MEDICARE

## 2024-01-17 DIAGNOSIS — I89.0 LYMPHEDEMA OF BOTH LOWER EXTREMITIES: Primary | ICD-10-CM

## 2024-01-17 PROCEDURE — 97140 MANUAL THERAPY 1/> REGIONS: CPT

## 2024-01-17 PROCEDURE — 97535 SELF CARE MNGMENT TRAINING: CPT

## 2024-01-17 NOTE — PROGRESS NOTES
[facility]  Occupational Therapy Treatment Note  Lymphedema    Date: 1/17/2024  Name: Char Savage  Clinic Number: [id number]    Therapy Diagnosis:   Encounter Diagnosis   Name Primary?    Lymphedema of both lower extremities Yes       Physician: Handy Andres MD*    Physician Orders: Eval and Treat  Medical Diagnosis: I89.0 (ICD-10-CM) - Lymphedema, not elsewhere classified  Evaluation Date: 12/5/2023  Insurance Authorization Period Expiration: 1/1/2025  Plan of Care Certification Period: 2/27/2024  Visit # / Visits authorized: 3 / 20  FOTO: see media, 2 / 3     Precautions:  Standard and Fall       Time In: 9:47  Time Out: 10:56  Total Billable Time: 69 minutes    SUBJECTIVE     Pt reports: new stockings arrived but she has not tried them on.  Char reported wearing compression outside of therapy visits: Yes - edema wear size small BLE  She was compliant with home exercise program given last session.   Response to previous treatment: edema reducing  Functional change: none    Pain: 0/10        OBJECTIVE     Pt arrived wearing edema wear on BLE, ambulating with cane, in NAD.     Compression garment status: purchased 20-30 mm hg zipper stockings, edema wear size small and medium.  Compression Rx status: not requested  Pneumatic pump status: not initiated     LE Girth Measurements: taken in supine  LANDMARK RIGHT LE  12/5 LEFT LE  12/5 RIGHT LE  1/3 LEFT LE  1/3 RIGHT LE  1/10 LEFT LE  1/10   SBP - - - - - -   10 below SBP 43.0 cm 43.5 cm 43.5 cm 45.0 cm 41.5 cm 42.0 cm   20 below SBP 41.0 cm 41.0 cm 39.0 cm 39.5 cm 38.5 cm 39.0 cm   30 below SBP 33.0 cm 34.5 cm 31.0 cm 33.5 cm 30.5 cm 32.5 cm   35 below SBP 26.5 cm 28.0 cm 25.0 cm 26.0 cm 25.0 cm 24.5 cm   Ankle 30.0 cm 30.0 cm 28.5 cm 29.5 cm 28.0 cm 28.5 cm   Forefoot 24.5 cm 25.5 cm 24.0 cm 23.5 cm 24.0 cm 24.0 cm         Treatment     Char received the treatments listed below:     Self-care/home management techniques  were completed for 60 minutes, including:     Practiced donning/doffing Allegro 15-20 mm hg stockings x 6 repetitions, via modified techniques  -increased time and effort required    Manual therapy techniques were applied for 9 minutes, including:      MULTILAYERED BANDAGING:    Issued supplies and bandaged BLE   Use of Cavilon moisturizer for dry skin  Cotton stockinet: size 9  Cellona padding from dorsum of foot to knee,   2-6cm 2-8cm and 2-10cm Rosidal K rolls foot to distal knee  Elastomull to toes    Pt to leave intact 24-48 hrs as tolerated, discontinue with any problems, return rolled bandages next session. Wash and wear schedules confirmed.       Patient Education and Home Exercises      Education provided:   - Progress towards goals     Written Home Exercises Provided:  not today .  Exercises were reviewed and Char was able to demonstrate them prior to the end of the session.  Char demonstrated good  understanding of the HEP provided. See EMR under Patient Instructions for exercises provided during therapy sessions.       Assessment     Pt unable to jorge 15-20 mm hg stockings despite increased time and effort, cueing, and modified techniques. Provided product information for larger size to self purchase and assess donning abilities.     Pt would continue to benefit from skilled OT.      Char is progressing well towards her goals and there are no updates to goals at this time. Pt prognosis is Good.     Pt will continue to benefit from skilled outpatient occupational therapy to address the deficits listed in the problem list on initial evaluation provide pt/family education and to maximize pt's level of independence in the home and community environment.     Pt's spiritual, cultural and educational needs considered and pt agreeable to plan of care and goals.    Anticipated barriers to occupational therapy: none    Goals:    Short Term Goals: (3 weeks)  Goals: Progressing / MET   1. Patient will  demonstrate 100% knowledge of lymphedema precautions and signs of infection to allow for reduced lymphedema risk, infection risk, and/or exacerbation of condition.  MET   2. Patient will tolerate daily activities wearing multilayered bandaging to allow for lymphatic drainage support, skin elasticity, and reduction in shape and size of limb.  MET   3. Patient and/or caregiver will order/obtain appropriate compression garments to maintain lymphatic and venous support.  MET   4. Patient will show decreased total girth measurement in BLE by up to 2 cm to allow for lower extremity symmetry, shoe and clothing choice, and ability to apply needed compression. NOT MET      Long Term Goals: (6 weeks)  Goals: Progressing / MET   1. Patient and/or caregiver to jorge/doff compression garment independently and with daily compliance to assist in lymphedema management, skin elasticity, and tissue density MET   2. Patient and/or caregiver will be independent in use of pneumatic compression pump or self manual lymphatic drainage techniques to areas within reach to enhance lymphatic drainage and skin condition.  NOT MET   3. Patient to be independent and compliant with home exercise program to allow for increased function in affected limb. MET   4. Patient will show decreased total girth measurement in BLE by up to 3 cm  to allow for lower extremity symmetry, shoe and clothing choice, and ability to apply needed compression daily. NOT MET          PLAN     Updates/Grading for next session: none    Genesis Ordonez, OT, CLWT

## 2024-01-23 ENCOUNTER — CLINICAL SUPPORT (OUTPATIENT)
Dept: REHABILITATION | Facility: HOSPITAL | Age: 84
End: 2024-01-23
Payer: MEDICARE

## 2024-01-23 DIAGNOSIS — I89.0 LYMPHEDEMA OF BOTH LOWER EXTREMITIES: Primary | ICD-10-CM

## 2024-01-23 PROCEDURE — 97140 MANUAL THERAPY 1/> REGIONS: CPT

## 2024-01-23 PROCEDURE — 97016 VASOPNEUMATIC DEVICE THERAPY: CPT

## 2024-01-23 PROCEDURE — 97535 SELF CARE MNGMENT TRAINING: CPT

## 2024-01-23 NOTE — PROGRESS NOTES
[facility]  Occupational Therapy Treatment Note  Lymphedema    Date: 1/23/2024  Name: Char Savage  Clinic Number: [id number]    Therapy Diagnosis:   Encounter Diagnosis   Name Primary?    Lymphedema of both lower extremities Yes         Physician: Handy Andres MD*    Physician Orders: Eval and Treat  Medical Diagnosis: I89.0 (ICD-10-CM) - Lymphedema, not elsewhere classified  Evaluation Date: 12/5/2023  Insurance Authorization Period Expiration: 1/1/2025  Plan of Care Certification Period: 2/27/2024  Visit # / Visits authorized: 4 / 20  FOTO: see media, 2 / 3     Precautions:  Standard and Fall       Time In: 2:05  Time Out: 3:00  Total Billable Time: 55 minutes    SUBJECTIVE     Pt reports: purchased new 15-20 mm hg stockings, have not yet arrived.   Char reported wearing compression outside of therapy visits: Yes - edema wear size small BLE  She was compliant with home exercise program given last session.   Response to previous treatment: edema reducing  Functional change: none    Pain: 0/10        OBJECTIVE     Pt arrived wearing edema wear on BLE, ambulating with cane, in NAD.     Compression garment status: purchased 20-30 mm hg zipper stockings, edema wear size small and medium.  Compression Rx status: not requested  Pneumatic pump status: not initiated     LE Girth Measurements: taken in supine  LANDMARK RIGHT LE  12/5 LEFT LE  12/5 RIGHT LE  1/3 LEFT LE  1/3 RIGHT LE  1/10 LEFT LE  1/10   SBP - - - - - -   10 below SBP 43.0 cm 43.5 cm 43.5 cm 45.0 cm 41.5 cm 42.0 cm   20 below SBP 41.0 cm 41.0 cm 39.0 cm 39.5 cm 38.5 cm 39.0 cm   30 below SBP 33.0 cm 34.5 cm 31.0 cm 33.5 cm 30.5 cm 32.5 cm   35 below SBP 26.5 cm 28.0 cm 25.0 cm 26.0 cm 25.0 cm 24.5 cm   Ankle 30.0 cm 30.0 cm 28.5 cm 29.5 cm 28.0 cm 28.5 cm   Forefoot 24.5 cm 25.5 cm 24.0 cm 23.5 cm 24.0 cm 24.0 cm         Treatment     Char received the treatments listed below:       Manual therapy techniques  were applied for 10 minutes, including:    SEQUENTIAL COMPRESSION PUMP: full leg sleeve applied to BLE  VES 5200 with default setting with distal pressures starting at 45mmHg entire LE simultaneous to education     MULTILAYERED BANDAGING:    Issued supplies and bandaged BLE   Use of Cavilon moisturizer for dry skin  Cotton stockinet: size 9  Cellona padding from dorsum of foot to knee,   2-6cm 2-8cm and 2-10cm Rosidal K rolls foot to distal knee  Elastomull to toes    Pt to leave intact 24-48 hrs as tolerated, discontinue with any problems, return rolled bandages next session. Wash and wear schedules confirmed.     Self-care/home management techniques were completed for 15 minutes, including:    Educated pt on:  -set-up and use of pneumatic pump  -referral process  -benefits of purchasing though not required      Patient Education and Home Exercises      Education provided:   - Progress towards goals     Written Home Exercises Provided:  not today .  Exercises were reviewed and Char was able to demonstrate them prior to the end of the session.  Char demonstrated good  understanding of the HEP provided. See EMR under Patient Instructions for exercises provided during therapy sessions.       Assessment     BLE greatly reduced. Session focused on pneumatic pump demonstration and use to facilitate home management. Anticipate discharge in upcoming sessions.     Pt would continue to benefit from skilled OT.      Char is progressing well towards her goals and there are no updates to goals at this time. Pt prognosis is Good.     Pt will continue to benefit from skilled outpatient occupational therapy to address the deficits listed in the problem list on initial evaluation provide pt/family education and to maximize pt's level of independence in the home and community environment.     Pt's spiritual, cultural and educational needs considered and pt agreeable to plan of care and goals.    Anticipated barriers to  occupational therapy: none    Goals:    Short Term Goals: (3 weeks)  Goals: Progressing / MET   1. Patient will demonstrate 100% knowledge of lymphedema precautions and signs of infection to allow for reduced lymphedema risk, infection risk, and/or exacerbation of condition.  MET   2. Patient will tolerate daily activities wearing multilayered bandaging to allow for lymphatic drainage support, skin elasticity, and reduction in shape and size of limb.  MET   3. Patient and/or caregiver will order/obtain appropriate compression garments to maintain lymphatic and venous support.  MET   4. Patient will show decreased total girth measurement in BLE by up to 2 cm to allow for lower extremity symmetry, shoe and clothing choice, and ability to apply needed compression. NOT MET      Long Term Goals: (6 weeks)  Goals: Progressing / MET   1. Patient and/or caregiver to jorge/doff compression garment independently and with daily compliance to assist in lymphedema management, skin elasticity, and tissue density MET   2. Patient and/or caregiver will be independent in use of pneumatic compression pump or self manual lymphatic drainage techniques to areas within reach to enhance lymphatic drainage and skin condition.  NOT MET   3. Patient to be independent and compliant with home exercise program to allow for increased function in affected limb. MET   4. Patient will show decreased total girth measurement in BLE by up to 3 cm  to allow for lower extremity symmetry, shoe and clothing choice, and ability to apply needed compression daily. NOT MET          PLAN     Updates/Grading for next session: none    Genesis Ordonez OT, CLWT

## 2024-01-29 ENCOUNTER — TELEPHONE (OUTPATIENT)
Dept: FAMILY MEDICINE | Facility: CLINIC | Age: 84
End: 2024-01-29
Payer: MEDICARE

## 2024-01-29 ENCOUNTER — HOSPITAL ENCOUNTER (EMERGENCY)
Facility: HOSPITAL | Age: 84
Discharge: HOME | End: 2024-01-29
Attending: EMERGENCY MEDICINE
Payer: MEDICARE

## 2024-01-29 VITALS
WEIGHT: 190 LBS | SYSTOLIC BLOOD PRESSURE: 178 MMHG | TEMPERATURE: 98 F | BODY MASS INDEX: 31.62 KG/M2 | HEART RATE: 81 BPM | DIASTOLIC BLOOD PRESSURE: 77 MMHG | OXYGEN SATURATION: 98 % | RESPIRATION RATE: 20 BRPM

## 2024-01-29 DIAGNOSIS — L03.119 CELLULITIS OF FOOT: Primary | ICD-10-CM

## 2024-01-29 DIAGNOSIS — B99.9 INFECTION: ICD-10-CM

## 2024-01-29 LAB
ALBUMIN SERPL BCP-MCNC: 4 G/DL (ref 3.5–5.2)
ALP SERPL-CCNC: 100 U/L (ref 55–135)
ALT SERPL W/O P-5'-P-CCNC: 12 U/L (ref 10–44)
ANION GAP SERPL CALC-SCNC: 11 MMOL/L (ref 8–16)
AST SERPL-CCNC: 19 U/L (ref 10–40)
BASOPHILS # BLD AUTO: 0.08 K/UL (ref 0–0.2)
BASOPHILS NFR BLD: 0.8 % (ref 0–1.9)
BILIRUB SERPL-MCNC: 0.7 MG/DL (ref 0.1–1)
BUN SERPL-MCNC: 25 MG/DL (ref 8–23)
CALCIUM SERPL-MCNC: 10.3 MG/DL (ref 8.7–10.5)
CHLORIDE SERPL-SCNC: 104 MMOL/L (ref 95–110)
CO2 SERPL-SCNC: 25 MMOL/L (ref 23–29)
CREAT SERPL-MCNC: 2 MG/DL (ref 0.5–1.4)
CRP SERPL-MCNC: 16.7 MG/L (ref 0–8.2)
DIFFERENTIAL METHOD BLD: NORMAL
EOSINOPHIL # BLD AUTO: 0.2 K/UL (ref 0–0.5)
EOSINOPHIL NFR BLD: 1.7 % (ref 0–8)
ERYTHROCYTE [DISTWIDTH] IN BLOOD BY AUTOMATED COUNT: 14.1 % (ref 11.5–14.5)
ERYTHROCYTE [SEDIMENTATION RATE] IN BLOOD BY PHOTOMETRIC METHOD: 28 MM/HR (ref 0–36)
EST. GFR  (NO RACE VARIABLE): 24 ML/MIN/1.73 M^2
GLUCOSE SERPL-MCNC: 133 MG/DL (ref 70–110)
HCT VFR BLD AUTO: 44.5 % (ref 37–48.5)
HGB BLD-MCNC: 14.4 G/DL (ref 12–16)
IMM GRANULOCYTES # BLD AUTO: 0.02 K/UL (ref 0–0.04)
IMM GRANULOCYTES NFR BLD AUTO: 0.2 % (ref 0–0.5)
LYMPHOCYTES # BLD AUTO: 2.6 K/UL (ref 1–4.8)
LYMPHOCYTES NFR BLD: 27.3 % (ref 18–48)
MCH RBC QN AUTO: 28.5 PG (ref 27–31)
MCHC RBC AUTO-ENTMCNC: 32.4 G/DL (ref 32–36)
MCV RBC AUTO: 88 FL (ref 82–98)
MONOCYTES # BLD AUTO: 0.8 K/UL (ref 0.3–1)
MONOCYTES NFR BLD: 8.6 % (ref 4–15)
NEUTROPHILS # BLD AUTO: 5.8 K/UL (ref 1.8–7.7)
NEUTROPHILS NFR BLD: 61.4 % (ref 38–73)
NRBC BLD-RTO: 0 /100 WBC
PLATELET # BLD AUTO: 280 K/UL (ref 150–450)
PMV BLD AUTO: 10.5 FL (ref 9.2–12.9)
POCT GLUCOSE: 136 MG/DL (ref 70–110)
POTASSIUM SERPL-SCNC: 4.1 MMOL/L (ref 3.5–5.1)
PROT SERPL-MCNC: 8.3 G/DL (ref 6–8.4)
RBC # BLD AUTO: 5.05 M/UL (ref 4–5.4)
SODIUM SERPL-SCNC: 140 MMOL/L (ref 136–145)
WBC # BLD AUTO: 9.43 K/UL (ref 3.9–12.7)

## 2024-01-29 PROCEDURE — 85025 COMPLETE CBC W/AUTO DIFF WBC: CPT | Performed by: STUDENT IN AN ORGANIZED HEALTH CARE EDUCATION/TRAINING PROGRAM

## 2024-01-29 PROCEDURE — 25000003 PHARM REV CODE 250: Performed by: PHYSICIAN ASSISTANT

## 2024-01-29 PROCEDURE — 99284 EMERGENCY DEPT VISIT MOD MDM: CPT

## 2024-01-29 PROCEDURE — 86140 C-REACTIVE PROTEIN: CPT | Performed by: STUDENT IN AN ORGANIZED HEALTH CARE EDUCATION/TRAINING PROGRAM

## 2024-01-29 PROCEDURE — 85652 RBC SED RATE AUTOMATED: CPT | Performed by: STUDENT IN AN ORGANIZED HEALTH CARE EDUCATION/TRAINING PROGRAM

## 2024-01-29 PROCEDURE — 80053 COMPREHEN METABOLIC PANEL: CPT | Performed by: STUDENT IN AN ORGANIZED HEALTH CARE EDUCATION/TRAINING PROGRAM

## 2024-01-29 PROCEDURE — 82962 GLUCOSE BLOOD TEST: CPT

## 2024-01-29 RX ORDER — CLINDAMYCIN HYDROCHLORIDE 150 MG/1
450 CAPSULE ORAL 3 TIMES DAILY
Qty: 45 CAPSULE | Refills: 0 | Status: SHIPPED | OUTPATIENT
Start: 2024-01-29 | End: 2024-02-02

## 2024-01-29 RX ORDER — CLINDAMYCIN HYDROCHLORIDE 150 MG/1
450 CAPSULE ORAL
Status: COMPLETED | OUTPATIENT
Start: 2024-01-29 | End: 2024-01-29

## 2024-01-29 RX ADMIN — CLINDAMYCIN HYDROCHLORIDE 450 MG: 150 CAPSULE ORAL at 09:01

## 2024-01-29 NOTE — ED PROVIDER NOTES
Encounter Date: 1/29/2024       History     Chief Complaint   Patient presents with    Foot Infection      Left foot, 3rd,4th,5th toes red hot, no open wounds      The history is provided by the patient and medical records. No  was used.       Char Savage is a 83 y.o. female with medical history of HTN, HLD, T2DM, CKD, Venous insufficieny, Obesity presenting to the ED with the chief complaint of skin complaint.     Patient reports having redness to her L foot and toes beginning a few days ago. She sees physical therapy for lymphedema who noticed the redness and advised her to come to the ED for evaluation. No recent antibiotics. No fever, chills, diaphoresis. Denies falls or trauma. She has associated pain. No chest pain or SOB.     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    Osteopenia 3/10/2022    Type 2 diabetes mellitus with stage 3 chronic kidney disease, without long-term current use of insulin 10/17/2017    Cannot tolerate metformin     Type 2 diabetes mellitus without complication 10/17/2017    Venous stasis dermatitis of both lower extremities 12/11/2019     Past Surgical History:   Procedure Laterality Date    APPENDECTOMY      BREAST BIOPSY Bilateral     CATARACT EXTRACTION  1993/2000    od/os    COLONOSCOPY      HYSTERECTOMY  07/1972    ORBITAL FRACTURE SURGERY  12/1998    os dr coleman    PARTIAL HYSTERECTOMY       RIB FRACTURE SURGERY      TONSILLECTOMY, ADENOIDECTOMY       Family History   Problem Relation Age of Onset    Cancer Mother     Breast cancer Mother     Cataracts Father     Hypertension Father     No Known Problems Sister     No Known Problems Brother     No Known Problems Maternal Aunt     No Known Problems Maternal Uncle     No Known Problems Paternal Aunt     No Known Problems Paternal Uncle     No Known Problems Maternal Grandmother     No Known Problems Maternal Grandfather     No Known Problems Paternal Grandmother     No Known Problems Paternal Grandfather     Amblyopia Neg Hx     Blindness Neg Hx     Diabetes Neg Hx     Glaucoma Neg Hx     Macular degeneration Neg Hx     Retinal detachment Neg Hx     Strabismus Neg Hx     Stroke Neg Hx     Thyroid disease Neg Hx      Social History     Tobacco Use    Smoking status: Never    Smokeless tobacco: Never   Substance Use Topics    Alcohol use: No     Alcohol/week: 0.0 standard drinks of alcohol    Drug use: No     Review of Systems   Skin:  Positive for rash.       Physical Exam     Initial Vitals [01/29/24 1531]   BP Pulse Resp Temp SpO2   (!) 177/81 76 16 98.1 °F (36.7 °C) 97 %      MAP       --         Physical Exam    Constitutional: She appears well-developed and well-nourished. She is not diaphoretic. No distress.   HENT:   Head: Normocephalic and atraumatic.   Eyes: EOM are normal. Pupils are equal, round, and reactive to light.   Neck: Neck supple.   Normal range of motion.  Cardiovascular:  Normal rate and regular rhythm.           +1 DP pulses BLE  +1 pitting edema BLE   Pulmonary/Chest: No respiratory distress.   Musculoskeletal:         General: Normal range of motion.      Cervical back: Normal range of motion and neck supple.      Comments: Macular erythematous rash to distal L foot including toes 2, 3, 4. No open wound. No fluctuance. +TTP     Neurological: She is alert and oriented to person, place, and time.   Skin: Skin is warm and dry. No  rash noted.     Left foot:              ED Course   Procedures  Labs Reviewed   COMPREHENSIVE METABOLIC PANEL - Abnormal; Notable for the following components:       Result Value    Glucose 133 (*)     BUN 25 (*)     Creatinine 2.0 (*)     eGFR 24.0 (*)     All other components within normal limits   C-REACTIVE PROTEIN - Abnormal; Notable for the following components:    CRP 16.7 (*)     All other components within normal limits   POCT GLUCOSE - Abnormal; Notable for the following components:    POCT Glucose 136 (*)     All other components within normal limits   CBC W/ AUTO DIFFERENTIAL   SEDIMENTATION RATE   POCT GLUCOSE MONITORING CONTINUOUS          Imaging Results              X-Ray Foot Complete Left (In process)                      Medications   clindamycin capsule 450 mg (450 mg Oral Given 1/29/24 2155)     Medical Decision Making  83 y.o. female with medical history of HTN, HLD, T2DM, CKD, Venous insufficieny, Obesity presenting to the ED c/o L foot pain.     Clinical presentation consistent with cellulitis. No open wound or concerns for foreign body. No fluctuance concerning for abscess. Duration of symptoms make osteomyelitis less likely. Intact distal pulses and do not suspect acute limb ischemia. Will check labs and X-ray.     Amount and/or Complexity of Data Reviewed  External Data Reviewed: labs and notes.  Labs: ordered. Decision-making details documented in ED Course.  Radiology: ordered and independent interpretation performed.    Risk  Prescription drug management.               ED Course as of 01/29/24 2159 Mon Jan 29, 2024 2156 CRP(!): 16.7  CRP mildly elevated. Normal ESR [BA]   2157 Creatinine(!): 2.0  Baseline CKD [BA]   2157 WBC: 9.43 [BA]   2157 Hemoglobin: 14.4 [BA]   2157 Hematocrit: 44.5 [BA]   2157 Multiple drug allergies - Doxycycline, Augmentin, Bactrim. Cellulitis appears macular and suspect strep as likely source. Will cover with Clindamycin. First dose given in the ED.  Perimeter marked. Ambulatory referral placed to podiatry. Patient expresses understanding and agreeable to the plan. Return to ED precautions given for new, worsening, or concerning symptoms. I have discussed the care of this patient with my supervising physician.  [BA]   2159 Reviewed XR with radiology, no acute findings noted. No fracture or bony destruction.  [BA]      ED Course User Index  [BA] Martin Espinosa PA-C                           Clinical Impression:  Final diagnoses:  [B99.9] Infection  [L03.119] Cellulitis of foot (Primary)                 Martin Espinosa PA-C  01/29/24 0762

## 2024-01-29 NOTE — FIRST PROVIDER EVALUATION
Medical screening examination initiated.  I have conducted a focused provider triage encounter, findings are as follows:    Brief history of present illness:  was at therapy today, may have L foot infection  Started a few days ago    Hx of DM. Lymphedema bilateral LE    Was told to come in for check up for infection    Vitals:    01/29/24 1531   Pulse: 76   Resp: 16   Temp: 98.1 °F (36.7 °C)   TempSrc: Oral   SpO2: 97%   Weight: 86.2 kg (190 lb)       Pertinent physical exam:  L foot at toes sparing the great toe with erythema/edema, warmth to touch, normal movement, normal perfusion, no fluctuance  No open wounds    Brief workup plan:  XR, labs, inflammatory markers  Accu check    Preliminary workup initiated; this workup will be continued and followed by the physician or advanced practice provider that is assigned to the patient when roomed.

## 2024-01-29 NOTE — TELEPHONE ENCOUNTER
----- Message from Genesis Ordonez OT sent at 1/29/2024  3:24 PM CST -----  Regarding: ? Cellulitis  Hello. This pt presented to lymphedema therapy with acute erythema, warmth to touch, and pain to L digits 2-4 that presented unprovoked over the weekend. Photos uploaded under media. Concerns for cellulitis. I sent her to the emergency room for evaluation.

## 2024-01-30 ENCOUNTER — DOCUMENTATION ONLY (OUTPATIENT)
Dept: REHABILITATION | Facility: HOSPITAL | Age: 84
End: 2024-01-30
Payer: MEDICARE

## 2024-01-30 LAB — POCT GLUCOSE: 123 MG/DL (ref 70–110)

## 2024-01-30 NOTE — PROGRESS NOTES
Presented to session with erythema, warmth, and pain to touch on L digits 2-4. Photos uploaded under media. Pt reported unprovoked onset over the weekend. Deferred treatment and sent pt to emergency room for evaluation, son present. Pt vocalized understanding that if an infection is present, cannot wear compression or participate in therapy x 72 hours. All questions and concerns addressed.    Genesis Ordonez, OT, CLWT

## 2024-01-30 NOTE — ED NOTES
Patient identifiers verified and correct for Ms Savage  C/C: redness to left foot toes SEE NN  APPEARANCE: awake and alert in NAD. PAIN  10/10  SKIN: warm, dry redness to left foot 2,3,4th toes   MUSCULOSKELETAL: Patient moving all extremities spontaneously, no obvious swelling or deformities noted. Ambulates independently with cane  RESPIRATORY: Denies shortness of breath.Respirations unlabored.   CARDIAC: Denies CP, 2+ distal pulses; no peripheral edema  ABDOMEN: S/ND/NT, Denies nausea  : voids spontaneously, denies difficulty  Neurologic: AAO x 4; follows commands equal strength in all extremities; denies numbness/tingling. Denies dizziness  Denies new weakness

## 2024-01-30 NOTE — DISCHARGE INSTRUCTIONS
Follow-up with podiatry for further evaluation and management.    Take the prescribed Clindamycin as directed to treat the infection.     Return to the emergency room for new, worsening, or concerning symptoms.     Future Appointments   Date Time Provider Department Center   1/30/2024  9:00 AM Genesis Ordonez OT [facility] OP [facility] 2 fl   2/1/2024  1:30 PM Genesis Ordonez OT [facility] OP [facility] 2 fl   3/19/2024 10:20 AM Julien Ferrari OD [facility]   5/10/2024  1:30 PM Handy Andres MD PhD [facility] [place]

## 2024-02-01 ENCOUNTER — PATIENT MESSAGE (OUTPATIENT)
Dept: FAMILY MEDICINE | Facility: CLINIC | Age: 84
End: 2024-02-01
Payer: MEDICARE

## 2024-02-02 RX ORDER — CLINDAMYCIN HYDROCHLORIDE 150 MG/1
150 CAPSULE ORAL 3 TIMES DAILY
Qty: 15 CAPSULE | Refills: 0 | Status: SHIPPED | OUTPATIENT
Start: 2024-02-02 | End: 2024-02-07

## 2024-02-06 ENCOUNTER — OFFICE VISIT (OUTPATIENT)
Dept: FAMILY MEDICINE | Facility: CLINIC | Age: 84
End: 2024-02-06
Payer: MEDICARE

## 2024-02-06 VITALS
DIASTOLIC BLOOD PRESSURE: 63 MMHG | WEIGHT: 194.13 LBS | TEMPERATURE: 98 F | BODY MASS INDEX: 32.35 KG/M2 | SYSTOLIC BLOOD PRESSURE: 142 MMHG | OXYGEN SATURATION: 95 % | HEART RATE: 67 BPM | HEIGHT: 65 IN

## 2024-02-06 DIAGNOSIS — N18.31 TYPE 2 DIABETES MELLITUS WITH STAGE 3A CHRONIC KIDNEY DISEASE, WITHOUT LONG-TERM CURRENT USE OF INSULIN: ICD-10-CM

## 2024-02-06 DIAGNOSIS — I70.0 AORTIC ATHEROSCLEROSIS: ICD-10-CM

## 2024-02-06 DIAGNOSIS — N18.30 TYPE 2 DIABETES MELLITUS WITH STAGE 3 CHRONIC KIDNEY DISEASE, WITHOUT LONG-TERM CURRENT USE OF INSULIN, UNSPECIFIED WHETHER STAGE 3A OR 3B CKD: ICD-10-CM

## 2024-02-06 DIAGNOSIS — E78.5 HYPERLIPIDEMIA, UNSPECIFIED HYPERLIPIDEMIA TYPE: ICD-10-CM

## 2024-02-06 DIAGNOSIS — I87.2 VENOUS STASIS DERMATITIS OF BOTH LOWER EXTREMITIES: ICD-10-CM

## 2024-02-06 DIAGNOSIS — R60.0 EDEMA OF BOTH LOWER EXTREMITIES: ICD-10-CM

## 2024-02-06 DIAGNOSIS — E11.22 TYPE 2 DIABETES MELLITUS WITH STAGE 3 CHRONIC KIDNEY DISEASE, WITHOUT LONG-TERM CURRENT USE OF INSULIN, UNSPECIFIED WHETHER STAGE 3A OR 3B CKD: ICD-10-CM

## 2024-02-06 DIAGNOSIS — N18.4 CHRONIC KIDNEY DISEASE (CKD), STAGE IV (SEVERE): ICD-10-CM

## 2024-02-06 DIAGNOSIS — I10 HYPERTENSION, BENIGN: ICD-10-CM

## 2024-02-06 DIAGNOSIS — E66.01 CLASS 2 SEVERE OBESITY DUE TO EXCESS CALORIES WITH SERIOUS COMORBIDITY AND BODY MASS INDEX (BMI) OF 35.0 TO 35.9 IN ADULT: ICD-10-CM

## 2024-02-06 DIAGNOSIS — L03.032 CELLULITIS OF TOE OF LEFT FOOT: Primary | ICD-10-CM

## 2024-02-06 DIAGNOSIS — E11.22 TYPE 2 DIABETES MELLITUS WITH STAGE 3A CHRONIC KIDNEY DISEASE, WITHOUT LONG-TERM CURRENT USE OF INSULIN: ICD-10-CM

## 2024-02-06 PROCEDURE — 99999 PR PBB SHADOW E&M-EST. PATIENT-LVL V: CPT | Mod: PBBFAC,,,

## 2024-02-06 PROCEDURE — 99214 OFFICE O/P EST MOD 30 MIN: CPT | Mod: S$GLB,,,

## 2024-02-06 NOTE — ASSESSMENT & PLAN NOTE
The current medical regimen is effective;  continue present plan and medications.    Lisinopril 20mg, metoprolol 100mg

## 2024-02-06 NOTE — PROGRESS NOTES
HPI     Chief Complaint:  Chief Complaint   Patient presents with    Follow-up       Char Savage is a 83 y.o. female with multiple medical diagnoses as listed in the medical history and problem list that presents for hospital f/u.  Pt is new to me but seen in clinic with last appointment 10/25/2023.      HPI      Hospital encounter notes, objective/subjective data, diagnostics, and plan of care from 1/29/2024 reviewed. Completed 5 day course of clindamycin from ED and then PCP prescribed another 5 days course of 150mg clindamycin tid x 5 days. Started course yesterday. Much Improvement to redness to left foot. Pain improved, pt reports pain still present to second toe but improved. No fever. Ambulatory without cane in clinic. Pt admits uses can sometimes. Pt will resume PT for lymphadema therapy next week. Will start wearing compression stockings once foot has heeled.           Medications   clindamycin capsule 450 mg (450 mg Oral Given 1/29/24 2155)      Medical Decision Making  83 y.o. female with medical history of HTN, HLD, T2DM, CKD, Venous insufficieny, Obesity presenting to the ED c/o L foot pain.      Clinical presentation consistent with cellulitis. No open wound or concerns for foreign body. No fluctuance concerning for abscess. Duration of symptoms make osteomyelitis less likely. Intact distal pulses and do not suspect acute limb ischemia. Will check labs and X-ray.      Amount and/or Complexity of Data Reviewed  External Data Reviewed: labs and notes.  Labs: ordered. Decision-making details documented in ED Course.  Radiology: ordered and independent interpretation performed.     Risk  Prescription drug management.                     ED Course as of 01/29/24 2159 Mon Jan 29, 2024 2156 CRP(!): 16.7  CRP mildly elevated. Normal ESR [BA]   2157 Creatinine(!): 2.0  Baseline CKD [BA]   2157 WBC: 9.43 [BA]   2157 Hemoglobin: 14.4 [BA]   2157 Hematocrit: 44.5 [BA]   2157 Multiple drug allergies -  Doxycycline, Augmentin, Bactrim. Cellulitis appears macular and suspect strep as likely source. Will cover with Clindamycin. First dose given in the ED. Perimeter marked. Ambulatory referral placed to podiatry. Patient expresses understanding and agreeable to the plan. Return to ED precautions given for new, worsening, or concerning symptoms. I have discussed the care of this patient with my supervising physician.  [BA]   2159 Reviewed XR with radiology, no acute findings noted. No fracture or bony destruction.  [BA]       ED Course User Index  [BA] Martin Espinosa PA-C                      Clinical Impression:  Final diagnoses:  [B99.9] Infection  [L03.119] Cellulitis of foot (Primary)              Martin Espinosa PA-C  01/29/24 2159       Other concerns below  Assessment & Plan       Problem List Items Addressed This Visit          Cardiac/Vascular    Hypertension, benign    Overview     Failed amlodipine  Stopped valsartan due to recall         Current Assessment & Plan     The current medical regimen is effective;  continue present plan and medications.    Lisinopril 20mg, metoprolol 100mg         Hyperlipidemia    Overview     Cannot tolerate pravastatin nor crestor.  Will focus on diet changes to improve her control.          Current Assessment & Plan     Statin intolerant. ASA. The current medical regimen is effective;  continue present plan and medications.           Venous stasis dermatitis of both lower extremities    Current Assessment & Plan     Hyperpigmentation noted. No blisters, weeping or ulcer. Edema noted bilaterally. Continue f/u with PT for mgmt. Lasix daily 20mg         Aortic atherosclerosis    Current Assessment & Plan     Stable, asymptomatic chronic condition.  Will continue to maximize risk factor reduction and adjust medication as needed              Renal/    Chronic kidney disease (CKD), stage IV (severe)    Current Assessment & Plan     Reviewed recent labs from ED. Continue sodium  restriction, and avoidance of nephrotoxic agents.    Sodium   Date Value Ref Range Status   01/29/2024 140 136 - 145 mmol/L Final     Potassium   Date Value Ref Range Status   01/29/2024 4.1 3.5 - 5.1 mmol/L Final     Chloride   Date Value Ref Range Status   01/29/2024 104 95 - 110 mmol/L Final     CO2   Date Value Ref Range Status   01/29/2024 25 23 - 29 mmol/L Final     Glucose   Date Value Ref Range Status   01/29/2024 133 (H) 70 - 110 mg/dL Final     BUN   Date Value Ref Range Status   01/29/2024 25 (H) 8 - 23 mg/dL Final     Creatinine   Date Value Ref Range Status   01/29/2024 2.0 (H) 0.5 - 1.4 mg/dL Final     Calcium   Date Value Ref Range Status   01/29/2024 10.3 8.7 - 10.5 mg/dL Final     Total Protein   Date Value Ref Range Status   01/29/2024 8.3 6.0 - 8.4 g/dL Final     Albumin   Date Value Ref Range Status   01/29/2024 4.0 3.5 - 5.2 g/dL Final     Total Bilirubin   Date Value Ref Range Status   01/29/2024 0.7 0.1 - 1.0 mg/dL Final     Comment:     For infants and newborns, interpretation of results should be based  on gestational age, weight and in agreement with clinical  observations.    Premature Infant recommended reference ranges:  Up to 24 hours.............<8.0 mg/dL  Up to 48 hours............<12.0 mg/dL  3-5 days..................<15.0 mg/dL  6-29 days.................<15.0 mg/dL       Alkaline Phosphatase   Date Value Ref Range Status   01/29/2024 100 55 - 135 U/L Final     AST   Date Value Ref Range Status   01/29/2024 19 10 - 40 U/L Final     ALT   Date Value Ref Range Status   01/29/2024 12 10 - 44 U/L Final     Anion Gap   Date Value Ref Range Status   01/29/2024 11 8 - 16 mmol/L Final     eGFR   Date Value Ref Range Status   01/29/2024 24.0 (A) >60 mL/min/1.73 m^2 Final               Endocrine    Class 2 severe obesity due to excess calories with serious comorbidity and body mass index (BMI) of 35.0 to 35.9 in adult    Type 2 diabetes mellitus with stage 3a chronic kidney disease, without  long-term current use of insulin    Overview     Cannot tolerate metformin          Current Assessment & Plan     Lab Results   Component Value Date    HGBA1C 6.5 (H) 08/02/2023   Diet/exercise. No medications. F/u with PCP for further mgmt treatment. Glucose from ED visit 133.             Other    Edema of both lower extremities    Current Assessment & Plan     Leg elevation, salt intake. Use compression stocking once cellulitis healed.     Stable, asymptomatic chronic condition.  Will continue to maximize risk factor reduction and adjust medication as needed            Other Visit Diagnoses       Cellulitis of toe of left foot    -  Primary  Complete abx  Monitor closely for worsening redness or pain  Referral placed to podiatry  Consider worsening DM  F/u in clinic in 1 week      Relevant Orders    Ambulatory referral/consult to Podiatry    Type 2 diabetes mellitus with stage 3 chronic kidney disease, without long-term current use of insulin, unspecified whether stage 3a or 3b CKD                --------------------------------------------      Health Maintenance:  Health Maintenance         Date Due Completion Date    TETANUS VACCINE Never done ---    Shingles Vaccine (1 of 2) Never done ---    RSV Vaccine (Age 60+ and Pregnant patients) (1 - 1-dose 60+ series) Never done ---    DEXA Scan 05/18/2023 5/18/2021    COVID-19 Vaccine (3 - 2023-24 season) 09/01/2023 3/4/2021    Eye Exam 12/13/2023 12/13/2022    Override on 12/22/2020: Done    Override on 11/12/2019: Done    Hemoglobin A1c 02/02/2024 8/2/2023              Follow Up:  Follow up if symptoms worsen or fail to improve.    Discussed DDx, condition, and treatment.   Education sent to patient portal/included in after visit summary.  ED precautions given.   Notify provider if symptoms do not resolve or increase in severity.   Patient verbalizes understanding and agrees with plan of care.      Exam     Review of Systems:  (as noted above)  Review of  "Systems    Physical Exam:   Physical Exam  Vitals:    02/06/24 0952   BP: (!) 142/63   Pulse: 67   Temp: 97.9 °F (36.6 °C)   TempSrc: Oral   SpO2: 95%   Weight: 88 kg (194 lb 1.8 oz)   Height: 5' 5" (1.651 m)      Body mass index is 32.3 kg/m².        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    Osteopenia 3/10/2022    Type 2 diabetes mellitus with stage 3 chronic kidney disease, without long-term current use of insulin 10/17/2017    Cannot tolerate metformin     Type 2 diabetes mellitus without complication 10/17/2017    Venous stasis dermatitis of both lower extremities 12/11/2019       Past Surgical History:  Past Surgical History:   Procedure Laterality Date    APPENDECTOMY      BREAST BIOPSY Bilateral     CATARACT EXTRACTION  1993/2000    od/os    COLONOSCOPY      HYSTERECTOMY  07/1972    ORBITAL FRACTURE SURGERY  12/1998    os dr coleman    PARTIAL HYSTERECTOMY      RIB FRACTURE SURGERY      TONSILLECTOMY, ADENOIDECTOMY         Social History:  Social History     Socioeconomic History    Marital status:    Tobacco Use    Smoking status: Never    Smokeless tobacco: Never   Substance and Sexual Activity    Alcohol use: No     Alcohol/week: 0.0 standard drinks of alcohol    Drug use: No       Family History:  Family History   Problem Relation Age of Onset    Cancer Mother     Breast cancer Mother     Cataracts Father     Hypertension Father     No Known Problems Sister     No Known Problems Brother     No Known Problems Maternal Aunt     No Known Problems Maternal Uncle     No Known Problems Paternal Aunt     No Known Problems Paternal Uncle     No Known Problems Maternal Grandmother     No Known Problems Maternal Grandfather     No Known Problems " Paternal Grandmother     No Known Problems Paternal Grandfather     Amblyopia Neg Hx     Blindness Neg Hx     Diabetes Neg Hx     Glaucoma Neg Hx     Macular degeneration Neg Hx     Retinal detachment Neg Hx     Strabismus Neg Hx     Stroke Neg Hx     Thyroid disease Neg Hx        Allergies and Medications: (updated and reviewed)  Review of patient's allergies indicates:   Allergen Reactions    Amoxicillin Other (See Comments)    Bactrim [sulfamethoxazole-trimethoprim] Other (See Comments)    Darvon [propoxyphene] Other (See Comments)    Statins-hmg-coa reductase inhibitors      Flu symptoms    Toradol [ketorolac] Other (See Comments)    Vibramycin [doxycycline calcium] Rash     Current Outpatient Medications   Medication Sig Dispense Refill    aspirin (ECOTRIN) 81 MG EC tablet Take 81 mg by mouth once daily.      clindamycin (CLEOCIN) 150 MG capsule Take 1 capsule (150 mg total) by mouth 3 (three) times daily. for 5 days 15 capsule 0    furosemide (LASIX) 20 MG tablet Take 1 tablet (20 mg total) by mouth 2 (two) times daily as needed (leg swelling). 180 tablet 3    KETOPROFEN, MICRONIZED TOP APPLY UP TO 3.2 GRAMS (2 PUMPS) TO PAINFUL AREAS UP TO FIVE TIMES DAILY. RUB IN WELL  3    lisinopriL (PRINIVIL,ZESTRIL) 20 MG tablet Take 1 tablet (20 mg total) by mouth once daily. 90 tablet 3    metoprolol tartrate (LOPRESSOR) 100 MG tablet Take 1 tablet (100 mg total) by mouth 2 (two) times daily. 180 tablet 12    multivitamin (THERAGRAN) per tablet Take 1 tablet by mouth once daily.      omeprazole (PRILOSEC) 20 MG capsule Take 1 capsule (20 mg total) by mouth once daily. 90 capsule 3    triamcinolone acetonide 0.1% (KENALOG) 0.1 % cream Apply topically 2 (two) times daily. (Patient taking differently: Apply topically as needed.) 2000 g 2    LORazepam (ATIVAN) 0.5 MG tablet Take 1 tablet (0.5 mg total) by mouth every 8 (eight) hours as needed for Anxiety. 45 tablet 3     No current facility-administered medications for  this visit.       Patient Care Team:  Joselito Vickers MD as PCP - General (Internal Medicine)  Nadja Abreu MA         - The patient is given an After Visit Summary that lists all medications with directions, allergies, education, orders placed during this encounter and follow-up instructions.      - I have reviewed the patient's medical information including past medical, family, and social history sections including the medications and allergies.      - We discussed the patient's current medications.     This note was created by combination of typed  and MModal dictation.  Transcription errors may be present.  If there are any questions, please contact me.

## 2024-02-06 NOTE — PATIENT INSTRUCTIONS
Please schedule with podiatry. Referral placed.     F/u in week in clinic to re-eval foot.     Continue to elevate legs and monitor sodium intake.

## 2024-02-06 NOTE — ASSESSMENT & PLAN NOTE
Hyperpigmentation noted. No blisters, weeping or ulcer. Edema noted bilaterally. Continue f/u with PT for mgmt. Lasix daily 20mg

## 2024-02-06 NOTE — ASSESSMENT & PLAN NOTE
Leg elevation, salt intake. Use compression stocking once cellulitis healed.     Stable, asymptomatic chronic condition.  Will continue to maximize risk factor reduction and adjust medication as needed

## 2024-02-06 NOTE — ASSESSMENT & PLAN NOTE
Stable, asymptomatic chronic condition.  Will continue to maximize risk factor reduction and adjust medication as needed

## 2024-02-06 NOTE — ASSESSMENT & PLAN NOTE
Statin intolerant. ASA. The current medical regimen is effective;  continue present plan and medications.

## 2024-02-06 NOTE — ASSESSMENT & PLAN NOTE
Lab Results   Component Value Date    HGBA1C 6.5 (H) 08/02/2023     Diet/exercise. No medications. F/u with PCP for further mgmt treatment. Glucose from ED visit 133.

## 2024-02-06 NOTE — ASSESSMENT & PLAN NOTE
Reviewed recent labs from ED. Continue sodium restriction, and avoidance of nephrotoxic agents.    Sodium   Date Value Ref Range Status   01/29/2024 140 136 - 145 mmol/L Final     Potassium   Date Value Ref Range Status   01/29/2024 4.1 3.5 - 5.1 mmol/L Final     Chloride   Date Value Ref Range Status   01/29/2024 104 95 - 110 mmol/L Final     CO2   Date Value Ref Range Status   01/29/2024 25 23 - 29 mmol/L Final     Glucose   Date Value Ref Range Status   01/29/2024 133 (H) 70 - 110 mg/dL Final     BUN   Date Value Ref Range Status   01/29/2024 25 (H) 8 - 23 mg/dL Final     Creatinine   Date Value Ref Range Status   01/29/2024 2.0 (H) 0.5 - 1.4 mg/dL Final     Calcium   Date Value Ref Range Status   01/29/2024 10.3 8.7 - 10.5 mg/dL Final     Total Protein   Date Value Ref Range Status   01/29/2024 8.3 6.0 - 8.4 g/dL Final     Albumin   Date Value Ref Range Status   01/29/2024 4.0 3.5 - 5.2 g/dL Final     Total Bilirubin   Date Value Ref Range Status   01/29/2024 0.7 0.1 - 1.0 mg/dL Final     Comment:     For infants and newborns, interpretation of results should be based  on gestational age, weight and in agreement with clinical  observations.    Premature Infant recommended reference ranges:  Up to 24 hours.............<8.0 mg/dL  Up to 48 hours............<12.0 mg/dL  3-5 days..................<15.0 mg/dL  6-29 days.................<15.0 mg/dL       Alkaline Phosphatase   Date Value Ref Range Status   01/29/2024 100 55 - 135 U/L Final     AST   Date Value Ref Range Status   01/29/2024 19 10 - 40 U/L Final     ALT   Date Value Ref Range Status   01/29/2024 12 10 - 44 U/L Final     Anion Gap   Date Value Ref Range Status   01/29/2024 11 8 - 16 mmol/L Final     eGFR   Date Value Ref Range Status   01/29/2024 24.0 (A) >60 mL/min/1.73 m^2 Final

## 2024-02-12 ENCOUNTER — OFFICE VISIT (OUTPATIENT)
Dept: FAMILY MEDICINE | Facility: CLINIC | Age: 84
End: 2024-02-12
Payer: MEDICARE

## 2024-02-12 VITALS
WEIGHT: 193.44 LBS | TEMPERATURE: 98 F | HEIGHT: 65 IN | BODY MASS INDEX: 32.23 KG/M2 | OXYGEN SATURATION: 95 % | HEART RATE: 64 BPM | DIASTOLIC BLOOD PRESSURE: 74 MMHG | SYSTOLIC BLOOD PRESSURE: 140 MMHG

## 2024-02-12 DIAGNOSIS — L03.032 CELLULITIS OF TOE OF LEFT FOOT: Primary | ICD-10-CM

## 2024-02-12 DIAGNOSIS — I10 HYPERTENSION, BENIGN: ICD-10-CM

## 2024-02-12 DIAGNOSIS — N18.4 CHRONIC KIDNEY DISEASE (CKD), STAGE IV (SEVERE): ICD-10-CM

## 2024-02-12 PROCEDURE — 99999 PR PBB SHADOW E&M-EST. PATIENT-LVL V: CPT | Mod: PBBFAC,,,

## 2024-02-12 PROCEDURE — 99214 OFFICE O/P EST MOD 30 MIN: CPT | Mod: S$GLB,,,

## 2024-02-12 NOTE — ASSESSMENT & PLAN NOTE
The current medical regimen is effective;  continue present plan and medications. Lopressor, lisinopril, lasix 20mg bid

## 2024-02-12 NOTE — PATIENT INSTRUCTIONS
Continue to monitor your foot. Return for increased redness/swelling/pain.     Keep future appt with podiatry on 2/22.     Okay to return to physical therapy this week.     Monitor sodium intake, elevated legs. Monitor BP at home and let me know if trending >140/80.     F/u with nephrology, referral placed.

## 2024-02-12 NOTE — PROGRESS NOTES
HPI     Chief Complaint:  Chief Complaint   Patient presents with    Recurrent Skin Infections    Hospital Follow Up       Char Savage is a 83 y.o. female with multiple medical diagnoses as listed in the medical history and problem list that presents for cellulitis f/u.  Pt is new to me but seen in clinic with last appointment 2/6/2024.      HPI    Cellulitis f/u. Pt doing well. No chest pain, SOB, increased food pain/swelling or redness.     Needs to know whether she can return to PT.     Continue leg elevation and sodium restriction.     Other concerns below  Assessment & Plan       Problem List Items Addressed This Visit          Cardiac/Vascular    Hypertension, benign    Overview     Failed amlodipine  Stopped valsartan due to recall         Current Assessment & Plan     The current medical regimen is effective;  continue present plan and medications. Lopressor, lisinopril, lasix 20mg bid     Monitor sodium intake, elevated legs. Monitor BP at home and let me know if trending >140/80.     F/u with nephrology, referral placed.             Renal/    Chronic kidney disease (CKD), stage IV (severe)    Current Assessment & Plan     Lisinopril.     Continue sodium restriction, and avoidance of nephrotoxic agents.  Recent gfr from January 24. Only take lasix prn, f/u with nephrology.          Relevant Orders    Ambulatory referral/consult to Nephrology     Other Visit Diagnoses       Cellulitis of toe of left foot    -  Primary  Much improved from previous picture and visit. Pt still endorses some mild tenderness to distal aspect second digit left foot.     Continue to monitor your foot. Return for increased redness/swelling/pain.     Keep future appt with podiatry on 2/22.     Okay to return to physical therapy this week.      Wear comfortable, proper fitting shoes. Wash feet daily. Dry well. After drying, apply moisturizer to feet (no lotion to webspaces). Inspect feet daily for skin breaks, blisters,  swelling, or redness. Wear cotton socks (preferably white)  Change socks every day. Do NOT walk barefoot. Do NOT use heating pads or warm/hot water soaks                       --------------------------------------------      Health Maintenance:  Health Maintenance         Date Due Completion Date    TETANUS VACCINE Never done ---    Shingles Vaccine (1 of 2) Never done ---    RSV Vaccine (Age 60+ and Pregnant patients) (1 - 1-dose 60+ series) Never done ---    DEXA Scan 05/18/2023 5/18/2021    COVID-19 Vaccine (3 - 2023-24 season) 09/01/2023 3/4/2021    Eye Exam 12/13/2023 12/13/2022    Override on 12/22/2020: Done    Override on 11/12/2019: Done    Hemoglobin A1c 02/02/2024 8/2/2023            Follow Up:  Follow up if symptoms worsen or fail to improve.    Discussed DDx, condition, and treatment.   Education sent to patient portal/included in after visit summary.  ED precautions given.   Notify provider if symptoms do not resolve or increase in severity.   Patient verbalizes understanding and agrees with plan of care.      Exam     Review of Systems:  (as noted above)  Review of Systems    Physical Exam:   Physical Exam  Constitutional:       General: She is not in acute distress.     Appearance: Normal appearance. She is obese. She is not toxic-appearing.   Cardiovascular:      Rate and Rhythm: Normal rate.      Pulses: Normal pulses.      Heart sounds: Normal heart sounds. No murmur heard.  Musculoskeletal:      Right lower leg: Edema present.      Left lower leg: Edema present.      Left foot: Normal capillary refill. Deformity, prominent metatarsal heads and tenderness present. Normal pulse.        Legs:    Skin:     General: Skin is warm.      Capillary Refill: Capillary refill takes less than 2 seconds.      Findings: No bruising, erythema, petechiae, rash or wound.   Neurological:      General: No focal deficit present.      Mental Status: She is alert.       Vitals:    02/12/24 1525   BP: (!) 140/74   BP  "Location: Left arm   Patient Position: Sitting   BP Method: Large (Manual)   Pulse: 64   Temp: 98.1 °F (36.7 °C)   TempSrc: Oral   SpO2: 95%   Weight: 87.8 kg (193 lb 7.3 oz)   Height: 5' 5" (1.651 m)      Body mass index is 32.19 kg/m².        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    Osteopenia 3/10/2022    Type 2 diabetes mellitus with stage 3 chronic kidney disease, without long-term current use of insulin 10/17/2017    Cannot tolerate metformin     Type 2 diabetes mellitus without complication 10/17/2017    Venous stasis dermatitis of both lower extremities 12/11/2019       Past Surgical History:  Past Surgical History:   Procedure Laterality Date    APPENDECTOMY      BREAST BIOPSY Bilateral     CATARACT EXTRACTION  1993/2000    od/os    COLONOSCOPY      HYSTERECTOMY  07/1972    ORBITAL FRACTURE SURGERY  12/1998    os dr coleman    PARTIAL HYSTERECTOMY      RIB FRACTURE SURGERY      TONSILLECTOMY, ADENOIDECTOMY         Social History:  Social History     Socioeconomic History    Marital status:    Tobacco Use    Smoking status: Never    Smokeless tobacco: Never   Substance and Sexual Activity    Alcohol use: No     Alcohol/week: 0.0 standard drinks of alcohol    Drug use: No       Family History:  Family History   Problem Relation Age of Onset    Cancer Mother     Breast cancer Mother     Cataracts Father     Hypertension Father     No Known Problems Sister     No Known Problems Brother     No Known Problems Maternal Aunt     No Known Problems Maternal Uncle     No Known Problems Paternal Aunt     No Known Problems Paternal Uncle     No Known Problems Maternal Grandmother     No Known Problems Maternal Grandfather     No Known Problems Paternal " Grandmother     No Known Problems Paternal Grandfather     Amblyopia Neg Hx     Blindness Neg Hx     Diabetes Neg Hx     Glaucoma Neg Hx     Macular degeneration Neg Hx     Retinal detachment Neg Hx     Strabismus Neg Hx     Stroke Neg Hx     Thyroid disease Neg Hx        Allergies and Medications: (updated and reviewed)  Review of patient's allergies indicates:   Allergen Reactions    Amoxicillin Other (See Comments)    Bactrim [sulfamethoxazole-trimethoprim] Other (See Comments)    Darvon [propoxyphene] Other (See Comments)    Statins-hmg-coa reductase inhibitors      Flu symptoms    Toradol [ketorolac] Other (See Comments)    Vibramycin [doxycycline calcium] Rash     Current Outpatient Medications   Medication Sig Dispense Refill    aspirin (ECOTRIN) 81 MG EC tablet Take 81 mg by mouth once daily.      furosemide (LASIX) 20 MG tablet Take 1 tablet (20 mg total) by mouth 2 (two) times daily as needed (leg swelling). 180 tablet 3    KETOPROFEN, MICRONIZED TOP APPLY UP TO 3.2 GRAMS (2 PUMPS) TO PAINFUL AREAS UP TO FIVE TIMES DAILY. RUB IN WELL  3    lisinopriL (PRINIVIL,ZESTRIL) 20 MG tablet Take 1 tablet (20 mg total) by mouth once daily. 90 tablet 3    LORazepam (ATIVAN) 0.5 MG tablet Take 1 tablet (0.5 mg total) by mouth every 8 (eight) hours as needed for Anxiety. 45 tablet 3    metoprolol tartrate (LOPRESSOR) 100 MG tablet Take 1 tablet (100 mg total) by mouth 2 (two) times daily. 180 tablet 12    multivitamin (THERAGRAN) per tablet Take 1 tablet by mouth once daily.      omeprazole (PRILOSEC) 20 MG capsule Take 1 capsule (20 mg total) by mouth once daily. 90 capsule 3    triamcinolone acetonide 0.1% (KENALOG) 0.1 % cream Apply topically 2 (two) times daily. (Patient taking differently: Apply topically as needed.) 2000 g 2     No current facility-administered medications for this visit.       Patient Care Team:  Joselito Vickers MD as PCP - General (Internal Medicine)  Nadja Abreu MA         - The  patient is given an After Visit Summary that lists all medications with directions, allergies, education, orders placed during this encounter and follow-up instructions.      - I have reviewed the patient's medical information including past medical, family, and social history sections including the medications and allergies.      - We discussed the patient's current medications.     This note was created by combination of typed  and MModal dictation.  Transcription errors may be present.  If there are any questions, please contact me.

## 2024-02-12 NOTE — ASSESSMENT & PLAN NOTE
Lisinopril.     Continue sodium restriction, and avoidance of nephrotoxic agents.  Recen gfr from January 24. Only take lasix prn, f/u with nephrology.

## 2024-02-15 ENCOUNTER — CLINICAL SUPPORT (OUTPATIENT)
Dept: REHABILITATION | Facility: HOSPITAL | Age: 84
End: 2024-02-15
Payer: MEDICARE

## 2024-02-15 DIAGNOSIS — I89.0 LYMPHEDEMA OF BOTH LOWER EXTREMITIES: Primary | ICD-10-CM

## 2024-02-15 PROCEDURE — 97535 SELF CARE MNGMENT TRAINING: CPT

## 2024-02-15 NOTE — PROGRESS NOTES
[facility]  Occupational Therapy Discharge Note  Lymphedema    Date: 2/15/2024  Name: Char Savage  Clinic Number: [id number]    Therapy Diagnosis:   Encounter Diagnosis   Name Primary?    Lymphedema of both lower extremities Yes       Physician: Handy Andres MD*    Physician Orders: Eval and Treat  Medical Diagnosis: I89.0 (ICD-10-CM) - Lymphedema, not elsewhere classified  Evaluation Date: 12/5/2023  Insurance Authorization Period Expiration: 1/1/2025  Plan of Care Certification Period: 2/27/2024  Visit # / Visits authorized: 5 / 20  FOTO: see media, 2 / 3     Precautions:  Standard and Fall       Time In: 2:33  Time Out: 3:20  Total Billable Time: 47 minutes    SUBJECTIVE     Pt reports: Completed antibiotics. Legs look much better. New stockings arrived and fit well. Pump arrived today.   Char reported wearing compression outside of therapy visits: Yes - edema wear size small BLE  She was compliant with home exercise program given last session.   Response to previous treatment:   Functional change: none    Pain: 0/10        OBJECTIVE     Pt arrived wearing edema wear on BLE, ambulating with cane, in NAD.     Compression garment status: 15-20 mm hg mediven knee highs   Compression Rx status: not requested  Pneumatic pump status: not initiated     LE Girth Measurements: taken in supine  LANDMARK RIGHT LE  12/5 LEFT LE  12/5 RIGHT LE  1/3 LEFT LE  1/3 RIGHT LE  1/10 LEFT LE  1/10   SBP - - - - - -   10 below SBP 43.0 cm 43.5 cm 43.5 cm 45.0 cm 41.5 cm 42.0 cm   20 below SBP 41.0 cm 41.0 cm 39.0 cm 39.5 cm 38.5 cm 39.0 cm   30 below SBP 33.0 cm 34.5 cm 31.0 cm 33.5 cm 30.5 cm 32.5 cm   35 below SBP 26.5 cm 28.0 cm 25.0 cm 26.0 cm 25.0 cm 24.5 cm   Ankle 30.0 cm 30.0 cm 28.5 cm 29.5 cm 28.0 cm 28.5 cm   Forefoot 24.5 cm 25.5 cm 24.0 cm 23.5 cm 24.0 cm 24.0 cm       LANDMARK RIGHT LE  2/15 LEFT LE  2/15   SBP - -   10 below SBP 42.0 cm 40.5 cm   20 below SBP 38.0 cm 37.0 cm    30 below SBP 30.5 cm 28.0 cm   35 below SBP 25.0 cm 24.5 cm   Ankle 28.5 cm 30.5 cm   Forefoot 23.5 cm 25.5 cm       Treatment     Char received the treatments listed below:       Self-care/home management techniques were completed for 47 minutes, including:    Practiced donning/doffing:  15-20 mm hg mediven knee highs  Inelastic Velcro wrap    Educated pt on:  -set-up and use of pneumatic pump  -home management: wear knee highs daily, remove before bed  -option for inleastic Velcro wraps as stronger compression garment      Patient Education and Home Exercises      Education provided:   - Progress towards goals     Written Home Exercises Provided:  not today .  Exercises were reviewed and Char was able to demonstrate them prior to the end of the session.  Char demonstrated good  understanding of the HEP provided. See EMR under Patient Instructions for exercises provided during therapy sessions.       Assessment     Cellulitis resolved. Edema status stable since last session. 15-20 mm hg stockings with great fit and pt able to jorge independently. Discussed option for Velcro wraps if interested. Pt is ready for discharge to home management. All questions and concerns addressed.       Anticipated barriers to occupational therapy: none    Goals:    Short Term Goals: (3 weeks)  Goals: Progressing / MET   1. Patient will demonstrate 100% knowledge of lymphedema precautions and signs of infection to allow for reduced lymphedema risk, infection risk, and/or exacerbation of condition.  MET   2. Patient will tolerate daily activities wearing multilayered bandaging to allow for lymphatic drainage support, skin elasticity, and reduction in shape and size of limb.  MET   3. Patient and/or caregiver will order/obtain appropriate compression garments to maintain lymphatic and venous support.  MET   4. Patient will show decreased total girth measurement in BLE by up to 2 cm to allow for lower extremity symmetry, shoe  and clothing choice, and ability to apply needed compression. MET      Long Term Goals: (6 weeks)  Goals: Progressing / MET   1. Patient and/or caregiver to jorge/doff compression garment independently and with daily compliance to assist in lymphedema management, skin elasticity, and tissue density MET   2. Patient and/or caregiver will be independent in use of pneumatic compression pump or self manual lymphatic drainage techniques to areas within reach to enhance lymphatic drainage and skin condition.  MET   3. Patient to be independent and compliant with home exercise program to allow for increased function in affected limb. MET   4. Patient will show decreased total girth measurement in BLE by up to 3 cm  to allow for lower extremity symmetry, shoe and clothing choice, and ability to apply needed compression daily. MET          PLAN     Discharge.     Genesis Ordonez, OT, CLWT

## 2024-02-22 ENCOUNTER — OFFICE VISIT (OUTPATIENT)
Dept: PODIATRY | Facility: CLINIC | Age: 84
End: 2024-02-22
Payer: MEDICARE

## 2024-02-22 VITALS
BODY MASS INDEX: 32.25 KG/M2 | HEIGHT: 65 IN | DIASTOLIC BLOOD PRESSURE: 82 MMHG | WEIGHT: 193.56 LBS | SYSTOLIC BLOOD PRESSURE: 188 MMHG

## 2024-02-22 DIAGNOSIS — B35.1 ONYCHOMYCOSIS: ICD-10-CM

## 2024-02-22 DIAGNOSIS — I89.0 LYMPHEDEMA OF BOTH LOWER EXTREMITIES: Primary | ICD-10-CM

## 2024-02-22 PROCEDURE — 99203 OFFICE O/P NEW LOW 30 MIN: CPT | Mod: S$GLB,,, | Performed by: PODIATRIST

## 2024-02-22 PROCEDURE — 99999 PR PBB SHADOW E&M-EST. PATIENT-LVL III: CPT | Mod: PBBFAC,,, | Performed by: PODIATRIST

## 2024-02-22 NOTE — PROGRESS NOTES
Subjective:     Patient ID: Char Savage is a 83 y.o. female.    Chief Complaint: Recurrent Skin Infections and Diabetes Mellitus (2/12/24 ELLI Calvin)    Char is a 83 y.o. female who presents to the clinic for evaluation and treatment of high risk feet. Char has a past medical history of Benign hypertension with chronic kidney disease, stage III (4/14/2015), Hyperlipidemia, Hypertension, Left eye injury (12/98), Nevus of choroid, Osteopenia (3/10/2022), Type 2 diabetes mellitus with stage 3 chronic kidney disease, without long-term current use of insulin (10/17/2017), Type 2 diabetes mellitus without complication (10/17/2017), and Venous stasis dermatitis of both lower extremities (12/11/2019). The patient's chief complaint is long, thick toenails. This patient has documented high risk feet requiring routine maintenance secondary to diabetes mellitis and those secondary complications of diabetes, as mentioned..    PCP: Joselito Vickers MD    Date Last Seen by PCP: per above        Hemoglobin A1C   Date Value Ref Range Status   08/02/2023 6.5 (H) 4.0 - 5.6 % Final     Comment:     ADA Screening Guidelines:  5.7-6.4%  Consistent with prediabetes  >or=6.5%  Consistent with diabetes    High levels of fetal hemoglobin interfere with the HbA1C  assay. Heterozygous hemoglobin variants (HbS, HgC, etc)do  not significantly interfere with this assay.   However, presence of multiple variants may affect accuracy.     03/10/2022 6.5 (H) 4.0 - 5.6 % Final     Comment:     ADA Screening Guidelines:  5.7-6.4%  Consistent with prediabetes  >or=6.5%  Consistent with diabetes    High levels of fetal hemoglobin interfere with the HbA1C  assay. Heterozygous hemoglobin variants (HbS, HgC, etc)do  not significantly interfere with this assay.   However, presence of multiple variants may affect accuracy.     03/16/2021 6.6 (H) 4.0 - 5.6 % Final     Comment:     ADA Screening Guidelines:  5.7-6.4%  Consistent with  prediabetes  >or=6.5%  Consistent with diabetes    High levels of fetal hemoglobin interfere with the HbA1C  assay. Heterozygous hemoglobin variants (HbS, HgC, etc)do  not significantly interfere with this assay.   However, presence of multiple variants may affect accuracy.         Review of Systems   Constitutional: Negative for chills.   Cardiovascular:  Negative for chest pain and claudication.   Respiratory:  Negative for cough.    Skin:  Positive for color change, dry skin and nail changes.   Musculoskeletal:  Positive for joint pain.   Gastrointestinal:  Negative for nausea.   Neurological:  Positive for paresthesias. Negative for numbness.   Psychiatric/Behavioral:  The patient is not nervous/anxious.         Objective:     Physical Exam  Constitutional:       Appearance: She is well-developed.      Comments: Oriented to time, place, and person.   Cardiovascular:      Comments: DP and PT pulses are palpable bilaterally. 3 sec capillary refill time and toes and feet are warm to touch proximally .  There is  hair growth on the feet and toes b/l. Edema noted B/L     Musculoskeletal:      Comments: Equinus noted b/l ankles with < 10 deg DF noted. MMT 5/5 in DF/PF/Inv/Ev resistance with no reproduction of pain in any direction. Passive range of motion of ankle and pedal joints is painless b/l.     Feet:      Right foot:      Skin integrity: No callus or dry skin.      Left foot:      Skin integrity: No callus or dry skin.   Lymphadenopathy:      Comments: Negative lymphadenopathy bilateral popliteal fossa and tarsal tunnel.   Skin:     Comments: Toenails 1-5 bilaterally are elongated by 2-3 mm, thickened by 2-3 mm, discolored/yellowed, dystrophic, brittle with subungual debris.     Neurological:      Mental Status: She is alert.      Comments: Light touch, proprioception, and sharp/dull sensation are all intact bilaterally. Protective threshold with the [place]ienstein monofilament is intact bilaterally.     Psychiatric:         Behavior: Behavior is cooperative.           Assessment:      Encounter Diagnoses   Name Primary?    Onychomycosis     Lymphedema of both lower extremities Yes     Plan:     Char was seen today for recurrent skin infections and diabetes mellitus.    Diagnoses and all orders for this visit:    Lymphedema of both lower extremities    Onychomycosis  -     Ambulatory referral/consult to Podiatry      I counseled the patient on her conditions, their implications and medical management.    - Shoe inspection. Diabetic Foot Education. Patient reminded of the importance of good nutrition and blood sugar control to help prevent podiatric complications of diabetes. Patient instructed on proper foot hygeine. We discussed wearing proper shoe gear, daily foot inspections, never walking without protective shoe gear, never putting sharp instruments to feet, routine podiatric nail visits every 2-3 months.   - With patient's permission, nails were aggressively reduced and debrided x 10 to their soft tissue attachment mechanically and with electric , removing all offending nail and debris. Patient relates relief following the procedure. He will continue to monitor the areas daily, inspect his feet, wear protective shoe gear when ambulatory, moisturizer to maintain skin integrity and follow in this office in approximately 2-3 months, sooner p.r.n.

## 2024-03-01 ENCOUNTER — PATIENT MESSAGE (OUTPATIENT)
Dept: ADMINISTRATIVE | Facility: HOSPITAL | Age: 84
End: 2024-03-01
Payer: MEDICARE

## 2024-03-19 ENCOUNTER — OFFICE VISIT (OUTPATIENT)
Dept: OPTOMETRY | Facility: CLINIC | Age: 84
End: 2024-03-19
Payer: MEDICARE

## 2024-03-19 DIAGNOSIS — H52.203 MYOPIA WITH ASTIGMATISM AND PRESBYOPIA, BILATERAL: ICD-10-CM

## 2024-03-19 DIAGNOSIS — E11.9 DIABETES MELLITUS TYPE 2 WITHOUT RETINOPATHY: Primary | ICD-10-CM

## 2024-03-19 DIAGNOSIS — H52.13 MYOPIA WITH ASTIGMATISM AND PRESBYOPIA, BILATERAL: ICD-10-CM

## 2024-03-19 DIAGNOSIS — H40.013 OAG (OPEN ANGLE GLAUCOMA) SUSPECT, LOW RISK, BILATERAL: ICD-10-CM

## 2024-03-19 DIAGNOSIS — H52.4 MYOPIA WITH ASTIGMATISM AND PRESBYOPIA, BILATERAL: ICD-10-CM

## 2024-03-19 DIAGNOSIS — Z96.1 PSEUDOPHAKIA OF BOTH EYES: ICD-10-CM

## 2024-03-19 DIAGNOSIS — H10.13 ALLERGIC CONJUNCTIVITIS, BILATERAL: ICD-10-CM

## 2024-03-19 PROCEDURE — 92015 DETERMINE REFRACTIVE STATE: CPT | Mod: S$GLB,,, | Performed by: OPTOMETRIST

## 2024-03-19 PROCEDURE — 92014 COMPRE OPH EXAM EST PT 1/>: CPT | Mod: S$GLB,,, | Performed by: OPTOMETRIST

## 2024-03-19 PROCEDURE — 99999 PR PBB SHADOW E&M-EST. PATIENT-LVL III: CPT | Mod: PBBFAC,,, | Performed by: OPTOMETRIST

## 2024-03-19 PROCEDURE — 92133 CPTRZD OPH DX IMG PST SGM ON: CPT | Mod: S$GLB,,, | Performed by: OPTOMETRIST

## 2024-03-19 NOTE — PROGRESS NOTES
HPI    DLS:  12/13/22    S/p phaco w/IOL OU  OTC tears PRN OU     Pt here for diabetic eye exam.    Pt states some difficulty with VA OU at distance and near OU.   Pt states occasional itching OU x 1 month.   Pt denies flashes, floaters, headaches or eye pain OU.    Pt denies tearing or burning OU.     Hemoglobin A1C       Date                     Value               Ref Range             Status                08/02/2023               6.5 (H)             4.0 - 5.6 %           Final                 03/10/2022               6.5 (H)             4.0 - 5.6 %           Final                 03/16/2021               6.6 (H)             4.0 - 5.6 %           Final                Last edited by Julien Ferrari, OD on 3/19/2024 10:41 AM.            Assessment /Plan     For exam results, see Encounter Report.    Diabetes mellitus type 2 without retinopathy  -No retinopathy noted today.  Continued control with primary care physician and annual comprehensive eye exam.    OAG (open angle glaucoma) suspect, low risk, bilateral  -     OCT, Optic Nerve - OU - Both Eyes  -OCT stable OU, most likely Physio enlarged and not GLC    Allergic conjunctivitis, bilateral  -Systane BID+    Pseudophakia of both eyes  -clear, centered    Myopia with astigmatism and presbyopia, bilateral  Eyeglass Final Rx       Eyeglass Final Rx         Sphere Cylinder Axis Dist VA Add    Right -2.00 +3.00 010 20/30 +2.50    Left -1.25 +3.00 165 20/30 +2.50      Type: PAL    Expiration Date: 3/19/2025                      RTC 1 yr

## 2024-03-20 DIAGNOSIS — I87.2 VENOUS STASIS DERMATITIS OF BOTH LOWER EXTREMITIES: ICD-10-CM

## 2024-03-20 RX ORDER — TRIAMCINOLONE ACETONIDE 1 MG/G
CREAM TOPICAL 2 TIMES DAILY
Qty: 2000 G | Refills: 2 | Status: SHIPPED | OUTPATIENT
Start: 2024-03-20

## 2024-03-20 NOTE — TELEPHONE ENCOUNTER
No care due was identified.  [facility] Embedded Care Due Messages. Reference number: [id number].   3/20/2024 12:08:38 PM CDT

## 2024-03-26 DIAGNOSIS — N18.4 CHRONIC KIDNEY DISEASE (CKD), STAGE IV (SEVERE): Primary | ICD-10-CM

## 2024-04-17 ENCOUNTER — PATIENT OUTREACH (OUTPATIENT)
Dept: ADMINISTRATIVE | Facility: HOSPITAL | Age: 84
End: 2024-04-17
Payer: MEDICARE

## 2024-04-17 DIAGNOSIS — E11.9 TYPE 2 DIABETES MELLITUS WITHOUT COMPLICATION, UNSPECIFIED WHETHER LONG TERM INSULIN USE: Primary | ICD-10-CM

## 2024-04-24 ENCOUNTER — LAB VISIT (OUTPATIENT)
Dept: LAB | Facility: HOSPITAL | Age: 84
End: 2024-04-24
Payer: MEDICARE

## 2024-04-24 DIAGNOSIS — N18.4 CHRONIC KIDNEY DISEASE (CKD), STAGE IV (SEVERE): ICD-10-CM

## 2024-04-24 LAB
BILIRUB UR QL STRIP: NEGATIVE
CLARITY UR REFRACT.AUTO: CLEAR
COLOR UR AUTO: YELLOW
CREAT UR-MCNC: 34 MG/DL (ref 15–325)
GLUCOSE UR QL STRIP: NEGATIVE
HGB UR QL STRIP: NEGATIVE
KETONES UR QL STRIP: NEGATIVE
LEUKOCYTE ESTERASE UR QL STRIP: NEGATIVE
NITRITE UR QL STRIP: NEGATIVE
PH UR STRIP: 6 [PH] (ref 5–8)
PROT UR QL STRIP: NEGATIVE
PROT UR-MCNC: <7 MG/DL (ref 0–15)
PROT/CREAT UR: NORMAL MG/G{CREAT} (ref 0–0.2)
SP GR UR STRIP: 1.01 (ref 1–1.03)
URN SPEC COLLECT METH UR: NORMAL

## 2024-04-24 PROCEDURE — 82570 ASSAY OF URINE CREATININE: CPT | Performed by: INTERNAL MEDICINE

## 2024-04-24 PROCEDURE — 81003 URINALYSIS AUTO W/O SCOPE: CPT | Performed by: INTERNAL MEDICINE

## 2024-04-29 PROBLEM — E79.0 HYPERURICEMIA: Status: ACTIVE | Noted: 2024-04-29

## 2024-04-29 PROBLEM — M89.9 CHRONIC KIDNEY DISEASE-MINERAL AND BONE DISORDER (CKD-MBD): Status: ACTIVE | Noted: 2024-04-29

## 2024-04-29 PROBLEM — E83.9 CHRONIC KIDNEY DISEASE-MINERAL AND BONE DISORDER (CKD-MBD): Status: ACTIVE | Noted: 2024-04-29

## 2024-04-29 PROBLEM — N18.32 STAGE 3B CHRONIC KIDNEY DISEASE: Status: ACTIVE | Noted: 2024-04-29

## 2024-04-29 PROBLEM — N18.9 CHRONIC KIDNEY DISEASE-MINERAL AND BONE DISORDER (CKD-MBD): Status: ACTIVE | Noted: 2024-04-29

## 2024-04-29 NOTE — PROGRESS NOTES
[facility]  [address] [facility], [address]  [place]  [phone]    Referring Provider: Mel Calvin NP  PCP: Joselito Vickers MD  Cardiologist: Dmitry      HPI: Mrs. Char Savage is a 83 y.o. female with HTN, T2DM, and lymphedema who is here for new patient evaluation of Chronic Kidney Disease  Prior records obtained and reviewed. She was seen by Dr. Mckenna (x1) in 2017; her CKD was attributed to HTN and DM at that time. Her baseline Cr has been 1.2-1.5 since 2009; though christal to 1.6-2.0 from 8/2023-1/2024. Her Cr is 1.4 today. No proteinuria.  She reports her T2DM has been well-controlled. Average A1c ~6.5% since 2016. She is not currently on diabetic medication. She is on lisinopril for HTN.  She denies h/o gout, kidney stones, or NSAID use. No family h/o kidney disease.   Her main concern is BLE edema which began ~2019. She reports undergoing extensive workup which was unremarkable. She was ultimately diagnosed with lymphedema. She wears compression socks and uses a lymphedema pump. She notes this has significantly improved her symptoms and has also improved her balance and ability to get around. She reports previously having weeping edema up to her knees; she no longer has drainage and edema only goes up to her shins.   She takes lasix 20mg BID. She notes augmented UOP. She was briefly on lasix 40mg BID; she noted increased UOP but no change in lymphedema on this regimen. She tries to follow a low salt diet.  She is hoping to get her ankle edema further improved.          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    Osteopenia 3/10/2022    Type 2 diabetes mellitus with stage 3 chronic kidney disease, without long-term current use of insulin 10/17/2017    Cannot tolerate  metformin     Type 2 diabetes mellitus without complication 10/17/2017    Venous stasis dermatitis of both lower extremities 12/11/2019       Past Surgical History:   Procedure Laterality Date    APPENDECTOMY      BREAST BIOPSY Bilateral     CATARACT EXTRACTION  1993/2000    od/os    COLONOSCOPY      HYSTERECTOMY  07/1972    ORBITAL FRACTURE SURGERY  12/1998    os dr coleman    PARTIAL HYSTERECTOMY      RIB FRACTURE SURGERY      TONSILLECTOMY, ADENOIDECTOMY         Family History   Problem Relation Name Age of Onset    Cancer Mother      Breast cancer Mother      Cataracts Father      Hypertension Father      No Known Problems Sister      No Known Problems Brother      No Known Problems Maternal Aunt      No Known Problems Maternal Uncle      No Known Problems Paternal Aunt      No Known Problems Paternal Uncle      No Known Problems Maternal Grandmother      No Known Problems Maternal Grandfather      No Known Problems Paternal Grandmother      No Known Problems Paternal Grandfather      Amblyopia Neg Hx      Blindness Neg Hx      Diabetes Neg Hx      Glaucoma Neg Hx      Macular degeneration Neg Hx      Retinal detachment Neg Hx      Strabismus Neg Hx      Stroke Neg Hx      Thyroid disease Neg Hx         Social History     Tobacco Use    Smoking status: Never    Smokeless tobacco: Never   Substance Use Topics    Alcohol use: No     Alcohol/week: 0.0 standard drinks of alcohol    Drug use: No         ROS:  Complete ROS otherwise negative except as indicated above.         Current Outpatient Medications:     aspirin (ECOTRIN) 81 MG EC tablet, Take 81 mg by mouth once daily., Disp: , Rfl:     furosemide (LASIX) 20 MG tablet, Take 1 tablet (20 mg total) by mouth 2 (two) times daily as needed (leg swelling)., Disp: 180 tablet, Rfl: 3    lisinopriL (PRINIVIL,ZESTRIL) 20 MG tablet, Take 1 tablet (20 mg total) by mouth once daily., Disp: 90 tablet, Rfl: 3    metoprolol tartrate (LOPRESSOR) 100 MG tablet, Take 1 tablet (100  "mg total) by mouth 2 (two) times daily., Disp: 180 tablet, Rfl: 12    multivit-minerals/folic acid (CENTRUM ADULT 50 PLUS ORAL), Take by mouth., Disp: , Rfl:     omeprazole (PRILOSEC) 20 MG capsule, Take 1 capsule (20 mg total) by mouth once daily., Disp: 90 capsule, Rfl: 3    triamcinolone acetonide 0.1% (KENALOG) 0.1 % cream, Apply topically 2 (two) times daily., Disp: 2000 g, Rfl: 2    KETOPROFEN, MICRONIZED TOP, APPLY UP TO 3.2 GRAMS (2 PUMPS) TO PAINFUL AREAS UP TO FIVE TIMES DAILY. RUB IN WELL (Patient not taking: Reported on 4/30/2024), Disp: , Rfl: 3    LORazepam (ATIVAN) 0.5 MG tablet, Take 1 tablet (0.5 mg total) by mouth every 8 (eight) hours as needed for Anxiety. (Patient not taking: Reported on 4/30/2024), Disp: 45 tablet, Rfl: 3    multivitamin (THERAGRAN) per tablet, Take 1 tablet by mouth once daily. (Patient not taking: Reported on 4/30/2024), Disp: , Rfl:       Vitals: Blood pressure (!) 150/76, pulse 70, resp. rate 18, height 5' 5" (1.651 m), weight 89.5 kg (197 lb 5 oz), SpO2 96%. Body mass index is 32.83 kg/m².    Physical Exam  Vitals reviewed.   Constitutional:       General: She is awake. She is not in acute distress.     Appearance: Normal appearance. She is well-developed.   HENT:      Head: Normocephalic and atraumatic.      Nose: Nose normal.      Mouth/Throat:      Mouth: Mucous membranes are moist.   Eyes:      Extraocular Movements: Extraocular movements intact.      Conjunctiva/sclera: Conjunctivae normal.   Pulmonary:      Effort: Pulmonary effort is normal.   Musculoskeletal:         General: No tenderness or signs of injury.      Right lower leg: Edema present.      Left lower leg: Edema present.      Comments: BLE with mild pitting edema to lower shin   Skin:     General: Skin is warm and dry.      Findings: No erythema or rash.   Neurological:      General: No focal deficit present.      Mental Status: She is alert. Mental status is at baseline.   Psychiatric:         Mood and " Affect: Mood normal.         Behavior: Behavior normal.           Labs/Imaging:  Sodium   Date Value Ref Range Status   04/24/2024 140 136 - 145 mmol/L Final   01/29/2024 140 136 - 145 mmol/L Final   11/24/2023 141 136 - 145 mmol/L Final     Potassium   Date Value Ref Range Status   04/24/2024 4.8 3.5 - 5.1 mmol/L Final   01/29/2024 4.1 3.5 - 5.1 mmol/L Final   11/24/2023 5.0 3.5 - 5.1 mmol/L Final     Chloride   Date Value Ref Range Status   04/24/2024 105 95 - 110 mmol/L Final   01/29/2024 104 95 - 110 mmol/L Final   11/24/2023 102 95 - 110 mmol/L Final     CO2   Date Value Ref Range Status   04/24/2024 27 23 - 29 mmol/L Final   01/29/2024 25 23 - 29 mmol/L Final   11/24/2023 26 23 - 29 mmol/L Final     BUN   Date Value Ref Range Status   04/24/2024 17 8 - 23 mg/dL Final   01/29/2024 25 (H) 8 - 23 mg/dL Final   11/24/2023 32 (H) 8 - 23 mg/dL Final     Creatinine   Date Value Ref Range Status   04/24/2024 1.4 0.5 - 1.4 mg/dL Final   01/29/2024 2.0 (H) 0.5 - 1.4 mg/dL Final   11/24/2023 1.9 (H) 0.5 - 1.4 mg/dL Final     eGFR   Date Value Ref Range Status   04/24/2024 36.9 (A) >60 mL/min/1.73 m^2 Final   01/29/2024 24.0 (A) >60 mL/min/1.73 m^2 Final   11/24/2023 25.6 (A) >60 mL/min/1.73 m^2 Final     Calcium   Date Value Ref Range Status   04/24/2024 10.1 8.7 - 10.5 mg/dL Final   01/29/2024 10.3 8.7 - 10.5 mg/dL Final   11/24/2023 9.8 8.7 - 10.5 mg/dL Final     Phosphorus   Date Value Ref Range Status   04/24/2024 3.5 2.7 - 4.5 mg/dL Final     Albumin   Date Value Ref Range Status   04/24/2024 3.7 3.5 - 5.2 g/dL Final   01/29/2024 4.0 3.5 - 5.2 g/dL Final   11/24/2023 3.8 3.5 - 5.2 g/dL Final       PTH, Intact   Date Value Ref Range Status   04/24/2024 42.4 9.0 - 77.0 pg/mL Final     Uric Acid   Date Value Ref Range Status   04/24/2024 8.6 (H) 2.4 - 5.7 mg/dL Final       Hemoglobin   Date Value Ref Range Status   04/24/2024 13.5 12.0 - 16.0 g/dL Final   01/29/2024 14.4 12.0 - 16.0 g/dL Final   08/02/2023 14.0 12.0 -  16.0 g/dL Final       Prot/Creat Ratio, Urine   Date Value Ref Range Status   04/24/2024 Unable to calculate 0.00 - 0.20 Final           Renal US: ordered       Impression/Plan:    Stage 3b chronic kidney disease  - baseline Cr 1.2-1.5 since 2009; clinically due to longstanding HTN/DM  - Cr christal to 1.6-2.0 from 8/2023-1/2024; suspecting this was related to volume status  - Cr 1.4 today; improved and back to baseline. UPCR negative; continue lisinopril 20mg daily  - agree with lasix 20mg po BID  - ordered renal US  - continue to avoid NSAIDs.     Lymphedema of both lower extremities  - seems to be significantly improved per patient report  - she denies improvement in edema on lasix 40mg BID. Continue lasix 20mg BID  - continue supportive care   - discussed importance of low salt diet     Hyperuricemia  - no h/o gout or kidney stones  - uric acid 8.6 today; elevated  - will repeat    Essential hypertension  - isolated systolic HTN in clinic today  - continue lisinopril 20mg daily  - low threshold to increase lisinopril to 30mg or to change lopressor to Toprol if BP stays elevated     Chronic kidney disease-mineral and bone disorder (CKD-MBD)  - PTH 42; controlled. CCa and phos normal. Will trend.       Visit today included increased complexity associated with the care of the episodic problem CKD addressed and managing the longitudinal care of the patient due to the serious and/or complex managed problem(s) CKD, hyperuricemia.      Treatment options and plan were discussed with the patient and/or caregiver.   RTC 3 months.       Alison Gomez MD  [facility]  [phone]

## 2024-04-30 ENCOUNTER — PATIENT MESSAGE (OUTPATIENT)
Dept: FAMILY MEDICINE | Facility: CLINIC | Age: 84
End: 2024-04-30
Payer: MEDICARE

## 2024-04-30 ENCOUNTER — OFFICE VISIT (OUTPATIENT)
Dept: NEPHROLOGY | Facility: CLINIC | Age: 84
End: 2024-04-30
Payer: MEDICARE

## 2024-04-30 VITALS
RESPIRATION RATE: 18 BRPM | HEIGHT: 65 IN | SYSTOLIC BLOOD PRESSURE: 150 MMHG | BODY MASS INDEX: 32.87 KG/M2 | WEIGHT: 197.31 LBS | HEART RATE: 70 BPM | OXYGEN SATURATION: 96 % | DIASTOLIC BLOOD PRESSURE: 76 MMHG

## 2024-04-30 DIAGNOSIS — M89.9 CHRONIC KIDNEY DISEASE-MINERAL AND BONE DISORDER (CKD-MBD): ICD-10-CM

## 2024-04-30 DIAGNOSIS — N18.9 CHRONIC KIDNEY DISEASE-MINERAL AND BONE DISORDER (CKD-MBD): ICD-10-CM

## 2024-04-30 DIAGNOSIS — I89.0 LYMPHEDEMA OF BOTH LOWER EXTREMITIES: ICD-10-CM

## 2024-04-30 DIAGNOSIS — I10 ESSENTIAL HYPERTENSION: ICD-10-CM

## 2024-04-30 DIAGNOSIS — E83.9 CHRONIC KIDNEY DISEASE-MINERAL AND BONE DISORDER (CKD-MBD): ICD-10-CM

## 2024-04-30 DIAGNOSIS — E79.0 HYPERURICEMIA: ICD-10-CM

## 2024-04-30 DIAGNOSIS — N18.32 STAGE 3B CHRONIC KIDNEY DISEASE: Primary | ICD-10-CM

## 2024-04-30 PROCEDURE — 1101F PT FALLS ASSESS-DOCD LE1/YR: CPT | Mod: CPTII,S$GLB,, | Performed by: INTERNAL MEDICINE

## 2024-04-30 PROCEDURE — 99204 OFFICE O/P NEW MOD 45 MIN: CPT | Mod: S$GLB,,, | Performed by: INTERNAL MEDICINE

## 2024-04-30 PROCEDURE — 1126F AMNT PAIN NOTED NONE PRSNT: CPT | Mod: CPTII,S$GLB,, | Performed by: INTERNAL MEDICINE

## 2024-04-30 PROCEDURE — 1160F RVW MEDS BY RX/DR IN RCRD: CPT | Mod: CPTII,S$GLB,, | Performed by: INTERNAL MEDICINE

## 2024-04-30 PROCEDURE — 99999 PR PBB SHADOW E&M-EST. PATIENT-LVL IV: CPT | Mod: PBBFAC,,, | Performed by: INTERNAL MEDICINE

## 2024-04-30 PROCEDURE — 3288F FALL RISK ASSESSMENT DOCD: CPT | Mod: CPTII,S$GLB,, | Performed by: INTERNAL MEDICINE

## 2024-04-30 PROCEDURE — 1159F MED LIST DOCD IN RCRD: CPT | Mod: CPTII,S$GLB,, | Performed by: INTERNAL MEDICINE

## 2024-04-30 PROCEDURE — 3078F DIAST BP <80 MM HG: CPT | Mod: CPTII,S$GLB,, | Performed by: INTERNAL MEDICINE

## 2024-04-30 PROCEDURE — 3077F SYST BP >= 140 MM HG: CPT | Mod: CPTII,S$GLB,, | Performed by: INTERNAL MEDICINE

## 2024-04-30 NOTE — ASSESSMENT & PLAN NOTE
- seems to be significantly improved per patient report  - she denies improvement in edema on lasix 40mg BID. Continue lasix 20mg BID  - continue supportive care   - discussed importance of low salt diet

## 2024-04-30 NOTE — ASSESSMENT & PLAN NOTE
- isolated systolic HTN in clinic today  - continue lisinopril 20mg daily  - low threshold to increase lisinopril to 30mg or to change lopressor to Toprol if BP stays elevated

## 2024-04-30 NOTE — ASSESSMENT & PLAN NOTE
- baseline Cr 1.2-1.5 since 2009; clinically due to longstanding HTN/DM  - Cr christal to 1.6-2.0 from 8/2023-1/2024; suspecting this was related to volume status  - Cr 1.4 today; improved and back to baseline. UPCR negative; continue lisinopril 20mg daily  - agree with lasix 20mg po BID  - ordered renal US  - continue to avoid NSAIDs.

## 2024-05-02 ENCOUNTER — LAB VISIT (OUTPATIENT)
Dept: LAB | Facility: HOSPITAL | Age: 84
End: 2024-05-02
Attending: INTERNAL MEDICINE
Payer: MEDICARE

## 2024-05-02 DIAGNOSIS — E78.2 MIXED HYPERLIPIDEMIA: ICD-10-CM

## 2024-05-02 LAB
CHOLEST SERPL-MCNC: 226 MG/DL (ref 120–199)
CHOLEST/HDLC SERPL: 3.5 {RATIO} (ref 2–5)
HDLC SERPL-MCNC: 64 MG/DL (ref 40–75)
HDLC SERPL: 28.3 % (ref 20–50)
LDLC SERPL CALC-MCNC: 139.4 MG/DL (ref 63–159)
NONHDLC SERPL-MCNC: 162 MG/DL
TRIGL SERPL-MCNC: 113 MG/DL (ref 30–150)

## 2024-05-02 PROCEDURE — 36415 COLL VENOUS BLD VENIPUNCTURE: CPT | Mod: PO | Performed by: INTERNAL MEDICINE

## 2024-05-02 PROCEDURE — 80061 LIPID PANEL: CPT | Performed by: INTERNAL MEDICINE

## 2024-05-10 ENCOUNTER — OFFICE VISIT (OUTPATIENT)
Dept: CARDIOLOGY | Facility: CLINIC | Age: 84
End: 2024-05-10
Payer: MEDICARE

## 2024-05-10 VITALS
BODY MASS INDEX: 32.54 KG/M2 | SYSTOLIC BLOOD PRESSURE: 174 MMHG | HEART RATE: 65 BPM | WEIGHT: 195.31 LBS | DIASTOLIC BLOOD PRESSURE: 76 MMHG | HEIGHT: 65 IN

## 2024-05-10 DIAGNOSIS — N18.9 CHRONIC KIDNEY DISEASE-MINERAL AND BONE DISORDER (CKD-MBD): ICD-10-CM

## 2024-05-10 DIAGNOSIS — E78.2 MIXED DIABETIC HYPERLIPIDEMIA ASSOCIATED WITH TYPE 2 DIABETES MELLITUS: ICD-10-CM

## 2024-05-10 DIAGNOSIS — Z78.9 STATIN INTOLERANCE: ICD-10-CM

## 2024-05-10 DIAGNOSIS — I70.0 AORTIC ATHEROSCLEROSIS: ICD-10-CM

## 2024-05-10 DIAGNOSIS — I10 ESSENTIAL HYPERTENSION: ICD-10-CM

## 2024-05-10 DIAGNOSIS — I89.0 LYMPHEDEMA OF BOTH LOWER EXTREMITIES: ICD-10-CM

## 2024-05-10 DIAGNOSIS — M89.9 CHRONIC KIDNEY DISEASE-MINERAL AND BONE DISORDER (CKD-MBD): ICD-10-CM

## 2024-05-10 DIAGNOSIS — N18.31 TYPE 2 DIABETES MELLITUS WITH STAGE 3A CHRONIC KIDNEY DISEASE, WITHOUT LONG-TERM CURRENT USE OF INSULIN: ICD-10-CM

## 2024-05-10 DIAGNOSIS — E78.2 MIXED HYPERLIPIDEMIA: ICD-10-CM

## 2024-05-10 DIAGNOSIS — E83.9 CHRONIC KIDNEY DISEASE-MINERAL AND BONE DISORDER (CKD-MBD): ICD-10-CM

## 2024-05-10 DIAGNOSIS — E11.22 TYPE 2 DIABETES MELLITUS WITH STAGE 3A CHRONIC KIDNEY DISEASE, WITHOUT LONG-TERM CURRENT USE OF INSULIN: ICD-10-CM

## 2024-05-10 DIAGNOSIS — R60.0 EDEMA OF BOTH LOWER EXTREMITIES: Primary | ICD-10-CM

## 2024-05-10 DIAGNOSIS — I12.9 BENIGN HYPERTENSION WITH CHRONIC KIDNEY DISEASE, STAGE III: ICD-10-CM

## 2024-05-10 DIAGNOSIS — E11.69 MIXED DIABETIC HYPERLIPIDEMIA ASSOCIATED WITH TYPE 2 DIABETES MELLITUS: ICD-10-CM

## 2024-05-10 DIAGNOSIS — I87.2 VENOUS STASIS DERMATITIS OF BOTH LOWER EXTREMITIES: ICD-10-CM

## 2024-05-10 DIAGNOSIS — N18.30 BENIGN HYPERTENSION WITH CHRONIC KIDNEY DISEASE, STAGE III: ICD-10-CM

## 2024-05-10 PROCEDURE — 3078F DIAST BP <80 MM HG: CPT | Mod: CPTII,S$GLB,, | Performed by: INTERNAL MEDICINE

## 2024-05-10 PROCEDURE — 1126F AMNT PAIN NOTED NONE PRSNT: CPT | Mod: CPTII,S$GLB,, | Performed by: INTERNAL MEDICINE

## 2024-05-10 PROCEDURE — 99999 PR PBB SHADOW E&M-EST. PATIENT-LVL III: CPT | Mod: PBBFAC,,, | Performed by: INTERNAL MEDICINE

## 2024-05-10 PROCEDURE — 3077F SYST BP >= 140 MM HG: CPT | Mod: CPTII,S$GLB,, | Performed by: INTERNAL MEDICINE

## 2024-05-10 PROCEDURE — 1101F PT FALLS ASSESS-DOCD LE1/YR: CPT | Mod: CPTII,S$GLB,, | Performed by: INTERNAL MEDICINE

## 2024-05-10 PROCEDURE — 99215 OFFICE O/P EST HI 40 MIN: CPT | Mod: S$GLB,,, | Performed by: INTERNAL MEDICINE

## 2024-05-10 PROCEDURE — 1159F MED LIST DOCD IN RCRD: CPT | Mod: CPTII,S$GLB,, | Performed by: INTERNAL MEDICINE

## 2024-05-10 PROCEDURE — 3288F FALL RISK ASSESSMENT DOCD: CPT | Mod: CPTII,S$GLB,, | Performed by: INTERNAL MEDICINE

## 2024-05-10 NOTE — PROGRESS NOTES
[facility]      Chief Complaint   Patient presents with    Edema of both lower extremities       Patient ID: Char Savage is a 83 y.o. female with HTN, HLD, DM2, CKD stage 3, obesity, who presents for a follow up appointment.  Pertinent history/events are as follows:     -Pt kindly referred by Dr. Vickers for evaluation of leg swelling.     -At our initial clinic visit on 11/17/2023, Mrs. Savage reported leg swelling for several years.  She reports periods of ulcers/wounds in 2019.  No ulcers/wounds currently.  She has no claudication, no chest pain or SOB.  Currently taking lasix 40 mg bid.  BNP mildly elevated at 142.  BLE Venous Ultrasound on 11/6/2023 demonstrated no evidence of deep venous thrombosis in either lower extremity.   Plan:   Leg Swelling- Due to chronic venous insufficiency and BLE lymphedema.  Check JUNACARLOS study.  Refer to lymphedema clinic.  Decrease lasix to 20 mg bid.  Check cmp today and in 1 week.  Recommend wearing graduated compression hose.  Limit sodium intake to 2000 mg daily.  Limit volume intake to 1.5 L daily.  Elevate legs when resting.  HTN- Continue current medication.  HLD- Check lipids prior to next visit.  Plan to start meds next visit.  Pt with documented statin intolerance.   Obesity- Encourage diet, exercise, and weight loss.    HPI:  Mrs. Savage reports significant improvement in leg swelling.  She has completed lymphedema clinic therapy and continues to perform techniques at home.      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    Osteopenia 3/10/2022    Type 2 diabetes mellitus with stage 3 chronic kidney disease, without long-term current use of insulin 10/17/2017    Cannot tolerate metformin     Type 2 diabetes mellitus without  complication 10/17/2017    Venous stasis dermatitis of both lower extremities 12/11/2019     Past Surgical History:   Procedure Laterality Date    APPENDECTOMY      BREAST BIOPSY Bilateral     CATARACT EXTRACTION  1993/2000    od/os    COLONOSCOPY      HYSTERECTOMY  07/1972    ORBITAL FRACTURE SURGERY  12/1998    os dr coleman    PARTIAL HYSTERECTOMY      RIB FRACTURE SURGERY      TONSILLECTOMY, ADENOIDECTOMY       Social History     Socioeconomic History    Marital status:    Tobacco Use    Smoking status: Never    Smokeless tobacco: Never   Substance and Sexual Activity    Alcohol use: No     Alcohol/week: 0.0 standard drinks of alcohol    Drug use: No     Family History   Problem Relation Name Age of Onset    Cancer Mother      Breast cancer Mother      Cataracts Father      Hypertension Father      No Known Problems Sister      No Known Problems Brother      No Known Problems Maternal Aunt      No Known Problems Maternal Uncle      No Known Problems Paternal Aunt      No Known Problems Paternal Uncle      No Known Problems Maternal Grandmother      No Known Problems Maternal Grandfather      No Known Problems Paternal Grandmother      No Known Problems Paternal Grandfather      Amblyopia Neg Hx      Blindness Neg Hx      Diabetes Neg Hx      Glaucoma Neg Hx      Macular degeneration Neg Hx      Retinal detachment Neg Hx      Strabismus Neg Hx      Stroke Neg Hx      Thyroid disease Neg Hx         Review of patient's allergies indicates:   Allergen Reactions    Amoxicillin Other (See Comments)    Bactrim [sulfamethoxazole-trimethoprim] Other (See Comments)    Darvon [propoxyphene] Other (See Comments)    Statins-hmg-coa reductase inhibitors      Flu symptoms    Toradol [ketorolac] Other (See Comments)    Metformin      Dehydration    Vibramycin [doxycycline calcium] Rash       Medication List with Changes/Refills   Current Medications    ASPIRIN (ECOTRIN) 81 MG EC TABLET    Take 81 mg by mouth once daily.     "FUROSEMIDE (LASIX) 20 MG TABLET    Take 1 tablet (20 mg total) by mouth 2 (two) times daily as needed (leg swelling).    LISINOPRIL (PRINIVIL,ZESTRIL) 20 MG TABLET    Take 1 tablet (20 mg total) by mouth once daily.    METOPROLOL TARTRATE (LOPRESSOR) 100 MG TABLET    Take 1 tablet (100 mg total) by mouth 2 (two) times daily.    MULTIVIT-MINERALS/FOLIC ACID (CENTRUM ADULT 50 PLUS ORAL)    Take by mouth once daily.    MULTIVITAMIN (THERAGRAN) PER TABLET    Take 1 tablet by mouth once daily.    OMEPRAZOLE (PRILOSEC) 20 MG CAPSULE    Take 1 capsule (20 mg total) by mouth once daily.    TRIAMCINOLONE ACETONIDE 0.1% (KENALOG) 0.1 % CREAM    Apply topically 2 (two) times daily.   Discontinued Medications    KETOPROFEN, MICRONIZED TOP    APPLY UP TO 3.2 GRAMS (2 PUMPS) TO PAINFUL AREAS UP TO FIVE TIMES DAILY. RUB IN WELL    LORAZEPAM (ATIVAN) 0.5 MG TABLET    Take 1 tablet (0.5 mg total) by mouth every 8 (eight) hours as needed for Anxiety.       Review of Systems  Constitution: Denies chills, fever, and sweats.  HENT: Denies headaches or blurry vision.  Cardiovascular: Denies chest pain or irregular heart beat.  Respiratory: Denies cough or shortness of breath.  Gastrointestinal: Denies abdominal pain, nausea, or vomiting.  Musculoskeletal: Denies muscle cramps.  Neurological: Denies dizziness or focal weakness.  Psychiatric/Behavioral: Normal mental status.  Hematologic/Lymphatic: Denies bleeding problem or easy bruising/bleeding.  Skin: Denies rash or suspicious lesions    Physical Examination  BP (!) 174/76   Pulse 65   Ht 5' 5" (1.651 m)   Wt 88.6 kg (195 lb 5.2 oz)   BMI 32.50 kg/m²     Constitutional: No acute distress, conversant  HEENT: Sclera anicteric, Pupils equal, round and reactive to light, extraocular motions intact, Oropharynx clear  Neck: No JVD, no carotid bruits  Cardiovascular: regular rate and rhythm, no murmur, rubs or gallops, normal S1/S2  Pulmonary: Clear to auscultation bilaterally  Abdominal: " "Abdomen soft, nontender, nondistended, positive bowel sounds  Extremities: No lower extremity edema,   Pulses:  Carotid pulses are 2+ on the right side, and 2+ on the left side.  Radial pulses are 2+ on the right side, and 2+ on the left side.   Femoral pulses are 2+ on the right side, and 2+ on the left side.  Popliteal pulses are 2+ on the right side, and 2+ on the left side.   Dorsalis pedis pulses are 2+ on the right side, and 2+ on the left side.   Posterior tibial pulses are 2+ on the right side, and 2+ on the left side.    Skin: No ecchymosis, erythema, or ulcers  Psych: Alert and oriented x 3, appropriate affect  Neuro: CNII-XII intact, no focal deficits    Labs:  Most Recent Data  CBC:   Lab Results   Component Value Date    WBC 7.26 04/24/2024    HGB 13.5 04/24/2024    HCT 43.1 04/24/2024     04/24/2024    MCV 90 04/24/2024    RDW 13.8 04/24/2024     BMP:   Lab Results   Component Value Date     04/24/2024    K 4.8 04/24/2024     04/24/2024    CO2 27 04/24/2024    BUN 17 04/24/2024    CREATININE 1.4 04/24/2024     04/24/2024    CALCIUM 10.1 04/24/2024    PHOS 3.5 04/24/2024     LFTS;   Lab Results   Component Value Date    PROT 8.3 01/29/2024    ALBUMIN 3.7 04/24/2024    BILITOT 0.7 01/29/2024    AST 19 01/29/2024    ALKPHOS 100 01/29/2024    ALT 12 01/29/2024     COAGS: No results found for: "INR", "PROTIME", "PTT"  FLP:   Lab Results   Component Value Date    CHOL 226 (H) 05/02/2024    HDL 64 05/02/2024    LDLCALC 139.4 05/02/2024    TRIG 113 05/02/2024    CHOLHDL 28.3 05/02/2024     CARDIAC:   Lab Results   Component Value Date     (H) 08/02/2023       Imaging:    BLE Venous Ultrasound 12/1/2023:    There is no evidence of a left lower extremity DVT.    The right superficial femoral middle vein is normal.    The left superficial femoral middle vein is normal.    JUANCARLOS Study 11/29/2023:    Normal resting ABIs.    Normal waveforms.    BLE Venous Ultrasound 11/6/2023:  No " evidence of deep venous thrombosis in either lower extremity.     Assessment/Plan:  Char Savage is a 83 y.o. female with HTN, HLD, DM2, CKD stage 3, obesity, who presents for a follow up appointment.     Leg Swelling- Due to chronic venous insufficiency and BLE lymphedema.  Now significantly improved and stable. Continue lymphedema clinic techniques at home.  Continue lasix 20 mg bid.  Continue graduated compression hose.  Limit sodium intake to 2000 mg daily.  Limit volume intake to 1.5 L daily.  Elevate legs when resting.    2. HTN- Continue current medication.    3. HLD-  on 5/2/2024. Pt with documented statin intolerance. Start bempedoic acid-zetia.       4. Obesity- Encourage diet, exercise, and weight loss.    Follow up in 6 months with lipids prior    Total duration of face to face visit time 45 minutes.  Total time spent counseling greater than fifty percent of total visit time.  Counseling included discussion regarding imaging findings, diagnosis, possibilities, treatment options, risks and benefits.  The patient had many questions regarding the options and long-term effects.    Handy Andres MD, PhD  Interventional Cardiology

## 2024-05-10 NOTE — PATIENT INSTRUCTIONS
Assessment/Plan:  Char Savage is a 83 y.o. female with HTN, HLD, DM2, CKD stage 3, obesity, who presents for a follow up appointment.     Leg Swelling- Due to chronic venous insufficiency and BLE lymphedema.  Now significantly improved and stable. Continue lymphedema clinic techniques at home.  Continue lasix 20 mg bid.  Continue graduated compression hose.  Limit sodium intake to 2000 mg daily.  Limit volume intake to 1.5 L daily.  Elevate legs when resting.    2. HTN- Continue current medication.    3. HLD-  on 5/2/2024. Pt with documented statin intolerance. Start bempedoic acid-zetia.       4. Obesity- Encourage diet, exercise, and weight loss.    Follow up in 6 months with lipids prior

## 2024-05-23 ENCOUNTER — OFFICE VISIT (OUTPATIENT)
Dept: PODIATRY | Facility: CLINIC | Age: 84
End: 2024-05-23
Payer: MEDICARE

## 2024-05-23 VITALS
WEIGHT: 195.31 LBS | SYSTOLIC BLOOD PRESSURE: 137 MMHG | BODY MASS INDEX: 32.54 KG/M2 | HEIGHT: 65 IN | DIASTOLIC BLOOD PRESSURE: 98 MMHG

## 2024-05-23 DIAGNOSIS — I89.0 LYMPHEDEMA OF BOTH LOWER EXTREMITIES: Primary | ICD-10-CM

## 2024-05-23 DIAGNOSIS — B35.1 ONYCHOMYCOSIS: ICD-10-CM

## 2024-05-23 DIAGNOSIS — L60.0 INGROWN NAIL: ICD-10-CM

## 2024-05-23 PROCEDURE — 99999 PR PBB SHADOW E&M-EST. PATIENT-LVL III: CPT | Mod: PBBFAC,,, | Performed by: PODIATRIST

## 2024-05-23 PROCEDURE — 99499 UNLISTED E&M SERVICE: CPT | Mod: Q9,S$GLB,, | Performed by: PODIATRIST

## 2024-05-23 PROCEDURE — 11721 DEBRIDE NAIL 6 OR MORE: CPT | Mod: S$GLB,,, | Performed by: PODIATRIST

## 2024-05-23 NOTE — PROGRESS NOTES
Subjective:     Patient ID: Char Savage is a 83 y.o. female.    Chief Complaint: Nail Care    Char is a 83 y.o. female who presents to the clinic for evaluation and treatment of high risk feet. Char has a past medical history of Benign hypertension with chronic kidney disease, stage III (4/14/2015), Hyperlipidemia, Hypertension, Left eye injury (12/98), Nevus of choroid, Osteopenia (3/10/2022), Type 2 diabetes mellitus with stage 3 chronic kidney disease, without long-term current use of insulin (10/17/2017), Type 2 diabetes mellitus without complication (10/17/2017), and Venous stasis dermatitis of both lower extremities (12/11/2019). The patient's chief complaint is long, thick toenails. This patient has documented high risk feet requiring routine maintenance secondary to diabetes mellitis and those secondary complications of diabetes, as mentioned..    PCP: Joselito Vickers MD    Date Last Seen by PCP: per above        Hemoglobin A1C   Date Value Ref Range Status   04/24/2024 6.5 (H) 4.0 - 5.6 % Final     Comment:     ADA Screening Guidelines:  5.7-6.4%  Consistent with prediabetes  >or=6.5%  Consistent with diabetes    High levels of fetal hemoglobin interfere with the HbA1C  assay. Heterozygous hemoglobin variants (HbS, HgC, etc)do  not significantly interfere with this assay.   However, presence of multiple variants may affect accuracy.     08/02/2023 6.5 (H) 4.0 - 5.6 % Final     Comment:     ADA Screening Guidelines:  5.7-6.4%  Consistent with prediabetes  >or=6.5%  Consistent with diabetes    High levels of fetal hemoglobin interfere with the HbA1C  assay. Heterozygous hemoglobin variants (HbS, HgC, etc)do  not significantly interfere with this assay.   However, presence of multiple variants may affect accuracy.     03/10/2022 6.5 (H) 4.0 - 5.6 % Final     Comment:     ADA Screening Guidelines:  5.7-6.4%  Consistent with prediabetes  >or=6.5%  Consistent with diabetes    High levels of fetal  hemoglobin interfere with the HbA1C  assay. Heterozygous hemoglobin variants (HbS, HgC, etc)do  not significantly interfere with this assay.   However, presence of multiple variants may affect accuracy.         Review of Systems   Constitutional: Negative for chills.   Cardiovascular:  Negative for chest pain and claudication.   Respiratory:  Negative for cough.    Skin:  Positive for color change, dry skin and nail changes.   Musculoskeletal:  Positive for joint pain.   Gastrointestinal:  Negative for nausea.   Neurological:  Positive for paresthesias. Negative for numbness.   Psychiatric/Behavioral:  The patient is not nervous/anxious.         Objective:     Physical Exam  Constitutional:       Appearance: She is well-developed.      Comments: Oriented to time, place, and person.   Cardiovascular:      Comments: DP and PT pulses are palpable bilaterally. 3 sec capillary refill time and toes and feet are warm to touch proximally .  There is  hair growth on the feet and toes b/l. Edema noted B/L     Musculoskeletal:      Comments: Equinus noted b/l ankles with < 10 deg DF noted. MMT 5/5 in DF/PF/Inv/Ev resistance with no reproduction of pain in any direction. Passive range of motion of ankle and pedal joints is painless b/l.     Feet:      Right foot:      Skin integrity: No callus or dry skin.      Left foot:      Skin integrity: No callus or dry skin.   Lymphadenopathy:      Comments: Negative lymphadenopathy bilateral popliteal fossa and tarsal tunnel.   Skin:     Comments: Toenails 1-5 bilaterally are elongated by 2-3 mm, thickened by 2-3 mm, discolored/yellowed, dystrophic, brittle with subungual debris.     Neurological:      Mental Status: She is alert.      Comments: Light touch, proprioception, and sharp/dull sensation are all intact bilaterally. Protective threshold with the [place]-Wienstein monofilament is intact bilaterally.    Psychiatric:         Behavior: Behavior is cooperative.           Assessment:       Encounter Diagnoses   Name Primary?    Lymphedema of both lower extremities Yes    Onychomycosis        Plan:     Char was seen today for nail care.    Diagnoses and all orders for this visit:    Lymphedema of both lower extremities    Onychomycosis        I counseled the patient on her conditions, their implications and medical management.    - Shoe inspection. Diabetic Foot Education. Patient reminded of the importance of good nutrition and blood sugar control to help prevent podiatric complications of diabetes. Patient instructed on proper foot hygeine. We discussed wearing proper shoe gear, daily foot inspections, never walking without protective shoe gear, never putting sharp instruments to feet, routine podiatric nail visits every 2-3 months.   - With patient's permission, nails were aggressively reduced and debrided x 10 to their soft tissue attachment mechanically and with electric , removing all offending nail and debris. Patient relates relief following the procedure. He will continue to monitor the areas daily, inspect his feet, wear protective shoe gear when ambulatory, moisturizer to maintain skin integrity and follow in this office in approximately 2-3 months, sooner p.r.n.

## 2024-06-13 DIAGNOSIS — Z78.9 STATIN INTOLERANCE: ICD-10-CM

## 2024-07-22 ENCOUNTER — TELEPHONE (OUTPATIENT)
Dept: ADMINISTRATIVE | Facility: CLINIC | Age: 84
End: 2024-07-22
Payer: MEDICARE

## 2024-07-22 NOTE — TELEPHONE ENCOUNTER
Called pt, informed pt I was calling to remind pt of her in office EAWV on 7/23/24; clinic location provided to patient; pt confirmed appointment and informed to bring all medications to appt

## 2024-07-23 ENCOUNTER — OFFICE VISIT (OUTPATIENT)
Dept: FAMILY MEDICINE | Facility: CLINIC | Age: 84
End: 2024-07-23
Payer: MEDICARE

## 2024-07-23 VITALS
HEIGHT: 65 IN | OXYGEN SATURATION: 94 % | SYSTOLIC BLOOD PRESSURE: 130 MMHG | DIASTOLIC BLOOD PRESSURE: 60 MMHG | HEART RATE: 64 BPM | WEIGHT: 186.5 LBS | TEMPERATURE: 98 F | BODY MASS INDEX: 31.07 KG/M2

## 2024-07-23 DIAGNOSIS — Z00.00 ENCOUNTER FOR PREVENTIVE HEALTH EXAMINATION: ICD-10-CM

## 2024-07-23 DIAGNOSIS — Z71.89 ADVANCED DIRECTIVES, COUNSELING/DISCUSSION: ICD-10-CM

## 2024-07-23 DIAGNOSIS — R26.9 ABNORMALITY OF GAIT AND MOBILITY: ICD-10-CM

## 2024-07-23 DIAGNOSIS — Z78.0 ASYMPTOMATIC POSTMENOPAUSAL STATUS: ICD-10-CM

## 2024-07-23 DIAGNOSIS — N18.32 TYPE 2 DIABETES MELLITUS WITH STAGE 3B CHRONIC KIDNEY DISEASE, WITHOUT LONG-TERM CURRENT USE OF INSULIN: ICD-10-CM

## 2024-07-23 DIAGNOSIS — I70.0 AORTIC ATHEROSCLEROSIS: ICD-10-CM

## 2024-07-23 DIAGNOSIS — H91.90 DECREASED HEARING, UNSPECIFIED LATERALITY: ICD-10-CM

## 2024-07-23 DIAGNOSIS — N18.32 STAGE 3B CHRONIC KIDNEY DISEASE: ICD-10-CM

## 2024-07-23 DIAGNOSIS — Z78.9 STATIN INTOLERANCE: ICD-10-CM

## 2024-07-23 DIAGNOSIS — I89.0 LYMPHEDEMA OF BOTH LOWER EXTREMITIES: ICD-10-CM

## 2024-07-23 DIAGNOSIS — Z00.00 ENCOUNTER FOR MEDICARE ANNUAL WELLNESS EXAM: Primary | ICD-10-CM

## 2024-07-23 DIAGNOSIS — E11.22 TYPE 2 DIABETES MELLITUS WITH STAGE 3B CHRONIC KIDNEY DISEASE, WITHOUT LONG-TERM CURRENT USE OF INSULIN: ICD-10-CM

## 2024-07-23 DIAGNOSIS — I10 ESSENTIAL HYPERTENSION: ICD-10-CM

## 2024-07-23 DIAGNOSIS — E78.2 MIXED DIABETIC HYPERLIPIDEMIA ASSOCIATED WITH TYPE 2 DIABETES MELLITUS: ICD-10-CM

## 2024-07-23 DIAGNOSIS — E11.69 MIXED DIABETIC HYPERLIPIDEMIA ASSOCIATED WITH TYPE 2 DIABETES MELLITUS: ICD-10-CM

## 2024-07-23 PROBLEM — N18.4 CHRONIC KIDNEY DISEASE (CKD), STAGE IV (SEVERE): Status: RESOLVED | Noted: 2024-02-06 | Resolved: 2024-07-23

## 2024-07-23 PROCEDURE — 99999 PR PBB SHADOW E&M-EST. PATIENT-LVL V: CPT | Mod: PBBFAC,,,

## 2024-07-23 PROCEDURE — G0439 PPPS, SUBSEQ VISIT: HCPCS | Mod: S$GLB,,,

## 2024-07-23 PROCEDURE — 1158F ADVNC CARE PLAN TLK DOCD: CPT | Mod: CPTII,S$GLB,,

## 2024-07-23 PROCEDURE — 3078F DIAST BP <80 MM HG: CPT | Mod: CPTII,S$GLB,,

## 2024-07-23 PROCEDURE — 3288F FALL RISK ASSESSMENT DOCD: CPT | Mod: CPTII,S$GLB,,

## 2024-07-23 PROCEDURE — 3075F SYST BP GE 130 - 139MM HG: CPT | Mod: CPTII,S$GLB,,

## 2024-07-23 PROCEDURE — 1170F FXNL STATUS ASSESSED: CPT | Mod: CPTII,S$GLB,,

## 2024-07-23 PROCEDURE — 1160F RVW MEDS BY RX/DR IN RCRD: CPT | Mod: CPTII,S$GLB,,

## 2024-07-23 PROCEDURE — 1126F AMNT PAIN NOTED NONE PRSNT: CPT | Mod: CPTII,S$GLB,,

## 2024-07-23 PROCEDURE — 1159F MED LIST DOCD IN RCRD: CPT | Mod: CPTII,S$GLB,,

## 2024-07-23 PROCEDURE — 1101F PT FALLS ASSESS-DOCD LE1/YR: CPT | Mod: CPTII,S$GLB,,

## 2024-07-23 NOTE — PROGRESS NOTES
HPI     Chief Complaint:  AWV    Char Savage is a 83 y.o. female with multiple medical diagnoses as listed in the medical history and problem list that presented for a Medicare AWV and comprehensive Health Risk Assessment today.  Pt is new to me but is known to this clinic with her last appointment being 2024.      The following components were reviewed and updated:    Medical history  Family History  Social history  Allergies and Current Medications  Health Risk Assessment  Health Maintenance  Care Team     HPI      Assessment & Plan     (all problems are new to me)    Diagnoses and health risks identified today and associated recommendations/orders:    Problem List Items Addressed This Visit          Cardiac/Vascular    Essential hypertension  The current medical regimen is effective;  continue present plan and medications.      Overview     Failed amlodipine  Stopped valsartan due to recall         Aortic atherosclerosis  Stable, asymptomatic chronic condition.  Will continue to maximize risk factor reduction and adjust medication as needed      Mixed diabetic hyperlipidemia associated with type 2 diabetes mellitus  Diet controlled. Due for annual. The current medical regimen is effective;  continue present plan and medications.  Lab Results   Component Value Date    HGBA1C 6.5 (H) 2024            Renal/    Stage 3b chronic kidney disease  F/b nephrology. Has labs within week to recheck kidney function. Continue sodium restriction, and avoidance of nephrotoxic agents.         Endocrine    BMI 31.0-31.9,adult  We discussed weight concerns and safe, effective ways of losing pounds, includin) diet:  low carbohydrate, low fat diet, stay away from fast food, fried and processed food, use whole grain, lot of fruits and vegetables, use healthy fat such as avocado, nuts and olive oil in reasonable quantity, stay away from sodas. Regular meals with lean proteins.  2) physical activity: ideally 150  min a week, with cardiovascular and resistance activity.  Patient was encouraged to set realistic attainable goals for weight loss, and we will follow up periodically.      Type 2 diabetes mellitus with stage 3b chronic kidney disease, without long-term current use of insulin  See above    Overview     Cannot tolerate metformin             Other    Lymphedema of both lower extremities  Stable, chronic condition.  Will continue to maximize risk factor reduction and adjust medication as needed      Statin intolerance  The current medical regimen is effective;  continue present plan and medications.       Other Visit Diagnoses       Encounter for Medicare annual wellness exam    -  Primary  Pt was seen today for an Annual Wellness visit. Healthcare maintenance and screening recommendations were discussed and updated as indicated. Return in one year for AWV.        Advanced directives, counseling/discussion     I offered to discuss advanced care planning, including how to pick a person who would make decisions for you if you were unable to make them for yourself, called a health care power of , and what kind of decisions you might make such as use of life sustaining treatments such as ventilators and tube feeding when faced with a life limiting illness recorded on a living will that they will need to know. (How you want to be cared for as you near the end of your natural life)     X Patient is interested in learning more about how to make advanced directives.  I provided them paperwork and offered to discuss this with them.       Abnormality of gait and mobility      Ambulatory with steady gait.     Encounter for preventive health examination   Counseled on age appropriate medical preventative services including age appropriate cancer screenings, age appropriate eye and dental exams, over all nutritional health, need for a consistent exercise regimen, and an over all push towards maintaining a vigorous and  "active lifestyle.  Counseled on age appropriate vaccines and discussed upcoming health care needs based on age/gender. Discussed good sleep hygiene and stress management.         Decreased hearing, unspecified laterality        Relevant Orders    Ambulatory referral/consult to Audiology              --------------------------------------------    ** See Completed Assessments for Annual Wellness Visit within the encounter summary.**    The following assessments were completed:  Living Situation  CAGE  Depression Screening  Timed Get Up and Go  Whisper Test  Cognitive Function Screening  Nutrition Screening  ADL Screening  PAQ Screening      Provided Char Savage  with a 5-10 year written screening schedule and personal prevention plan. Recommendations were developed using the USPSTF age appropriate recommendations. Education, counseling, and referrals were provided as needed. After Visit Summary printed and given to patient which includes a list of additional screenings\tests needed.    Review for Opioid Screening: Pt does not have Rx for Opioids     Review for Substance Use Disorders: Patient does not abuse use substances      Exam     Review of Systems:  (as noted above)  Review of Systems    Physical Exam:   Physical Exam  Vitals:    07/23/24 0927   BP: 130/60   Pulse: 64   Temp: 98.1 °F (36.7 °C)   TempSrc: Oral   SpO2: (!) 94%   Weight: 84.6 kg (186 lb 8.2 oz)   Height: 5' 5" (1.651 m)      Body mass index is 31.04 kg/m².    Clock:              Health Maintenance:  Health Maintenance         Date Due Completion Date    TETANUS VACCINE Never done ---    Shingles Vaccine (1 of 2) Never done ---    RSV Vaccine (Age 60+ and Pregnant patients) (1 - 1-dose 60+ series) Never done ---    DEXA Scan 05/18/2023 5/18/2021    COVID-19 Vaccine (3 - 2023-24 season) 09/01/2023 3/4/2021    Influenza Vaccine (1) 09/01/2024 9/18/2023    Hemoglobin A1c 10/24/2024 4/24/2024    Eye Exam 03/19/2025 3/19/2024    Override on " 12/22/2020: Done    Override on 11/12/2019: Done            Discussed the importance of overdue vaccines which were offered during this encounter. Patient declined overdue vaccines at this time    Due for annual      Follow Up:  Follow up if symptoms worsen or fail to improve.    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    Osteopenia 3/10/2022    Type 2 diabetes mellitus with stage 3 chronic kidney disease, without long-term current use of insulin 10/17/2017    Cannot tolerate metformin     Type 2 diabetes mellitus without complication 10/17/2017    Venous stasis dermatitis of both lower extremities 12/11/2019       Past Surgical History:  Past Surgical History:   Procedure Laterality Date    APPENDECTOMY      BREAST BIOPSY Bilateral     CATARACT EXTRACTION  1993/2000    od/os    COLONOSCOPY      HYSTERECTOMY  07/1972    ORBITAL FRACTURE SURGERY  12/1998    os dr coleman    PARTIAL HYSTERECTOMY      RIB FRACTURE SURGERY      TONSILLECTOMY, ADENOIDECTOMY         Social History:  Social History     Socioeconomic History    Marital status:    Tobacco Use    Smoking status: Never    Smokeless tobacco: Never   Substance and Sexual Activity    Alcohol use: No     Alcohol/week: 0.0 standard drinks of alcohol    Drug use: No       Family History:  Family History   Problem Relation Name Age of Onset    Cancer Mother      Breast cancer Mother      Cataracts Father      Hypertension Father      No Known Problems Sister      No Known Problems Brother      No Known Problems Maternal Aunt      No Known Problems Maternal Uncle      No Known Problems Paternal Aunt      No Known Problems Paternal Uncle      No Known Problems Maternal Grandmother      No Known Problems  Maternal Grandfather      No Known Problems Paternal Grandmother      No Known Problems Paternal Grandfather      Amblyopia Neg Hx      Blindness Neg Hx      Diabetes Neg Hx      Glaucoma Neg Hx      Macular degeneration Neg Hx      Retinal detachment Neg Hx      Strabismus Neg Hx      Stroke Neg Hx      Thyroid disease Neg Hx         Allergies and Medications: (updated and reviewed)  Review of patient's allergies indicates:   Allergen Reactions    Amoxicillin Other (See Comments)    Bactrim [sulfamethoxazole-trimethoprim] Other (See Comments)    Darvon [propoxyphene] Other (See Comments)    Statins-hmg-coa reductase inhibitors      Flu symptoms    Toradol [ketorolac] Other (See Comments)    Metformin      Dehydration    Vibramycin [doxycycline calcium] Rash     Current Outpatient Medications   Medication Sig Dispense Refill    aspirin (ECOTRIN) 81 MG EC tablet Take 81 mg by mouth once daily.      bempedoic acid-ezetimibe 180-10 mg Tab Take 1 tablet by mouth daily. 30 tablet 11    furosemide (LASIX) 20 MG tablet Take 1 tablet (20 mg total) by mouth 2 (two) times daily as needed (leg swelling). 180 tablet 3    lisinopriL (PRINIVIL,ZESTRIL) 20 MG tablet Take 1 tablet (20 mg total) by mouth once daily. 90 tablet 3    metoprolol tartrate (LOPRESSOR) 100 MG tablet Take 1 tablet (100 mg total) by mouth 2 (two) times daily. 180 tablet 12    multivit-minerals/folic acid (CENTRUM ADULT 50 PLUS ORAL) Take by mouth once daily.      multivitamin (THERAGRAN) per tablet Take 1 tablet by mouth once daily.      omeprazole (PRILOSEC) 20 MG capsule Take 1 capsule (20 mg total) by mouth once daily. 90 capsule 0    triamcinolone acetonide 0.1% (KENALOG) 0.1 % cream Apply topically 2 (two) times daily. 2000 g 2     No current facility-administered medications for this visit.       Patient Care Team:  Joselito Vickers MD as PCP - General (Internal Medicine)  Nadja Abreu MA LeBlanc, Bronwyn Natalie, MD as Consulting Physician  (Nephrology)         - The patient is given an After Visit Summary that lists all medications with directions, allergies, education, orders placed during this encounter and follow-up instructions.      - I have reviewed the patient's medical information including past medical, family, and social history sections including the medications and allergies.      - We discussed the patient's current medications.     This note was created by combination of typed  and MModal dictation.  Transcription errors may be present.  If there are any questions, please contact me.           I offered to discuss advanced care planning, including how to pick a person who would make decisions for you if you were unable to make them for yourself, called a health care power of , and what kind of decisions you might make such as use of life sustaining treatments such as ventilators and tube feeding when faced with a life limiting illness recorded on a living will that they will need to know. (How you want to be cared for as you near the end of your natural life)     X Patient is interested in learning more about how to make advanced directives.  I provided them paperwork and offered to discuss this with them.

## 2024-07-23 NOTE — PATIENT INSTRUCTIONS
Counseling and Referral of Other Preventative  (Italic type indicates deductible and co-insurance are waived)    Patient Name: Char Savage  Today's Date: 7/23/2024    Health Maintenance       Date Due Completion Date    TETANUS VACCINE Never done ---    Shingles Vaccine (1 of 2) Never done ---    RSV Vaccine (Age 60+ and Pregnant patients) (1 - 1-dose 60+ series) Never done ---    DEXA Scan 05/18/2023 5/18/2021    COVID-19 Vaccine (3 - 2023-24 season) 09/01/2023 3/4/2021    Influenza Vaccine (1) 09/01/2024 9/18/2023    Hemoglobin A1c 10/24/2024 4/24/2024    Eye Exam 03/19/2025 3/19/2024    Override on 12/22/2020: Done    Override on 11/12/2019: Done        No orders of the defined types were placed in this encounter.    The following information is provided to all patients.  This information is to help you find resources for any of the problems found today that may be affecting your health:                  Living healthy guide: www.[facility].[place].gov      Understanding Diabetes: www.diabetes.org      Eating healthy: www.cdc.gov/healthyweight      CDC home safety checklist: www.cdc.gov/steadi/patient.html      Agency on Aging: www.goea.[place].gov      Alcoholics anonymous (AA): www.aa.org      Physical Activity: www.sarah.nih.gov/bd3rbyk      Tobacco use: www.quitwithusla.org

## 2024-07-28 NOTE — TELEPHONE ENCOUNTER
No care due was identified.  Health [facility] Embedded Care Due Messages. Reference number: [id number].   7/28/2024 10:28:02 AM CDT

## 2024-07-29 ENCOUNTER — HOSPITAL ENCOUNTER (OUTPATIENT)
Dept: RADIOLOGY | Facility: HOSPITAL | Age: 84
Discharge: HOME | End: 2024-07-29
Attending: INTERNAL MEDICINE
Payer: MEDICARE

## 2024-07-29 DIAGNOSIS — M89.9 CHRONIC KIDNEY DISEASE-MINERAL AND BONE DISORDER (CKD-MBD): ICD-10-CM

## 2024-07-29 DIAGNOSIS — E79.0 HYPERURICEMIA: ICD-10-CM

## 2024-07-29 DIAGNOSIS — E83.9 CHRONIC KIDNEY DISEASE-MINERAL AND BONE DISORDER (CKD-MBD): ICD-10-CM

## 2024-07-29 DIAGNOSIS — I10 ESSENTIAL HYPERTENSION: ICD-10-CM

## 2024-07-29 DIAGNOSIS — I89.0 LYMPHEDEMA OF BOTH LOWER EXTREMITIES: ICD-10-CM

## 2024-07-29 DIAGNOSIS — N18.9 CHRONIC KIDNEY DISEASE-MINERAL AND BONE DISORDER (CKD-MBD): ICD-10-CM

## 2024-07-29 DIAGNOSIS — N18.32 STAGE 3B CHRONIC KIDNEY DISEASE: ICD-10-CM

## 2024-07-29 PROCEDURE — 76770 US EXAM ABDO BACK WALL COMP: CPT | Mod: 26,,, | Performed by: RADIOLOGY

## 2024-07-29 PROCEDURE — 76770 US EXAM ABDO BACK WALL COMP: CPT | Mod: TC

## 2024-07-29 RX ORDER — LISINOPRIL 20 MG/1
20 TABLET ORAL DAILY
Qty: 90 TABLET | Refills: 1 | Status: SHIPPED | OUTPATIENT
Start: 2024-07-29 | End: 2025-01-25

## 2024-07-31 DIAGNOSIS — R60.0 PERIPHERAL EDEMA: ICD-10-CM

## 2024-07-31 RX ORDER — FUROSEMIDE 20 MG/1
TABLET ORAL
Qty: 180 TABLET | Refills: 3 | Status: SHIPPED | OUTPATIENT
Start: 2024-07-31

## 2024-07-31 NOTE — TELEPHONE ENCOUNTER
No care due was identified.  [facility] Embedded Care Due Messages. Reference number: [id number].   7/31/2024 9:27:36 AM CDT   ADMIT

## 2024-08-09 ENCOUNTER — HOSPITAL ENCOUNTER (OUTPATIENT)
Dept: RADIOLOGY | Facility: CLINIC | Age: 84
Discharge: HOME | End: 2024-08-09
Payer: MEDICARE

## 2024-08-09 DIAGNOSIS — Z78.0 ASYMPTOMATIC POSTMENOPAUSAL STATUS: ICD-10-CM

## 2024-08-09 PROCEDURE — 77080 DXA BONE DENSITY AXIAL: CPT | Mod: TC,PO

## 2024-08-09 PROCEDURE — 77080 DXA BONE DENSITY AXIAL: CPT | Mod: 26,ICN,, | Performed by: INTERNAL MEDICINE

## 2024-08-10 PROBLEM — N17.9 AKI (ACUTE KIDNEY INJURY): Status: ACTIVE | Noted: 2024-08-10

## 2024-08-10 PROBLEM — N28.1 RENAL CYST: Status: ACTIVE | Noted: 2024-08-10

## 2024-08-10 NOTE — PROGRESS NOTES
[facility]  [address] [facility], [address]  [place]  [phone]    PCP: Joselito Vickers MD  Cardiologist: Dmitry       HPI: Mrs. Char Savage is a 84 y.o. female with HTN, T2DM, and lymphedema who is here for established patient evaluation of Chronic Kidney Disease  Initial visit 4/30/24. Her baseline Cr has been 1.2-1.5 since 2009; though christal to 1.6-2.0 from 8/2023-1/2024. Her Cr is 1.4 today. No proteinuria.  She reports her T2DM has been well-controlled. Average A1c ~6.5% since 2016. She is not currently on diabetic medication. She is on lisinopril for HTN.  She denies h/o gout, kidney stones, or NSAID use. No family h/o kidney disease.   Her main concern is BLE edema which began ~2019. She reports undergoing extensive workup which was unremarkable. She was ultimately diagnosed with lymphedema. She wears compression socks and uses a lymphedema pump. She notes this has significantly improved her symptoms and has also improved her balance and ability to get around. She reports previously having weeping edema up to her knees; she no longer has drainage and edema only goes up to her shins.   She takes lasix 20mg BID. She notes augmented UOP. She was briefly on lasix 40mg BID; she noted increased UOP but no change in lymphedema on this regimen. She tries to follow a low salt diet.  She is hoping to get her ankle edema further improved.      Interval Hx:   LV 4/30/24: reinforced low salt diet.  Today she expresses concern about worsening BLE lymphedema. She reports she was previously doing well on her regimen of keeping legs clean and using lymphedema pumps; however lately swelling has been more tense and painful. She reports compliance with lasix 20mg BID with good UOP. She admits to possibly eating more salt than she should.   BP controlled on current regimen. No h/o gout but started on Nexlizet since our last visit for cholesterol.  Next cardiology appt in November.   +OA pain in hands.  "      ROS:  Complete ROS otherwise negative except as indicated above.         Current Outpatient Medications:     aspirin (ECOTRIN) 81 MG EC tablet, Take 81 mg by mouth once daily., Disp: , Rfl:     bempedoic acid-ezetimibe 180-10 mg Tab, Take 1 tablet by mouth daily., Disp: 30 tablet, Rfl: 11    furosemide (LASIX) 20 MG tablet, TAKE 1 TABLET BY MOUTH TWICE A DAY AS NEEDED FOR LEG SWELLING, Disp: 180 tablet, Rfl: 3    lisinopriL (PRINIVIL,ZESTRIL) 20 MG tablet, Take 1 tablet (20 mg total) by mouth once daily., Disp: 90 tablet, Rfl: 1    metoprolol tartrate (LOPRESSOR) 100 MG tablet, Take 1 tablet (100 mg total) by mouth 2 (two) times daily., Disp: 180 tablet, Rfl: 12    multivit-minerals/folic acid (CENTRUM ADULT 50 PLUS ORAL), Take by mouth once daily., Disp: , Rfl:     omeprazole (PRILOSEC) 20 MG capsule, Take 1 capsule (20 mg total) by mouth once daily., Disp: 90 capsule, Rfl: 0    triamcinolone acetonide 0.1% (KENALOG) 0.1 % cream, Apply topically 2 (two) times daily., Disp: 2000 g, Rfl: 2    allopurinoL (ZYLOPRIM) 100 MG tablet, Take 1 tablet (100 mg total) by mouth once daily., Disp: 30 tablet, Rfl: 11      Vitals: Blood pressure 126/64, pulse 73, resp. rate 18, height 5' 5" (1.651 m), weight 84.3 kg (185 lb 13.6 oz), SpO2 96%. Body mass index is 30.93 kg/m².    Physical Exam  Vitals reviewed.   Constitutional:       General: She is awake. She is not in acute distress.     Appearance: Normal appearance. She is well-developed.   HENT:      Head: Normocephalic and atraumatic.      Nose: Nose normal.      Mouth/Throat:      Mouth: Mucous membranes are moist.   Eyes:      Extraocular Movements: Extraocular movements intact.      Conjunctiva/sclera: Conjunctivae normal.   Pulmonary:      Effort: Pulmonary effort is normal.   Musculoskeletal:         General: No tenderness or signs of injury.      Right lower leg: Edema present.      Left lower leg: Edema present.      Comments: BLE with significant TENSE edema " today   Skin:     General: Skin is warm and dry.      Findings: No erythema or rash.   Neurological:      General: No focal deficit present.      Mental Status: She is alert. Mental status is at baseline.   Psychiatric:         Mood and Affect: Mood normal.         Behavior: Behavior normal.           Labs/Imaging:  Sodium   Date Value Ref Range Status   07/29/2024 140 136 - 145 mmol/L Final   04/24/2024 140 136 - 145 mmol/L Final   01/29/2024 140 136 - 145 mmol/L Final     Potassium   Date Value Ref Range Status   07/29/2024 4.5 3.5 - 5.1 mmol/L Final   04/24/2024 4.8 3.5 - 5.1 mmol/L Final   01/29/2024 4.1 3.5 - 5.1 mmol/L Final     Chloride   Date Value Ref Range Status   07/29/2024 104 95 - 110 mmol/L Final   04/24/2024 105 95 - 110 mmol/L Final   01/29/2024 104 95 - 110 mmol/L Final     CO2   Date Value Ref Range Status   07/29/2024 25 23 - 29 mmol/L Final   04/24/2024 27 23 - 29 mmol/L Final   01/29/2024 25 23 - 29 mmol/L Final     BUN   Date Value Ref Range Status   07/29/2024 33 (H) 8 - 23 mg/dL Final   04/24/2024 17 8 - 23 mg/dL Final   01/29/2024 25 (H) 8 - 23 mg/dL Final     Creatinine   Date Value Ref Range Status   07/29/2024 2.1 (H) 0.5 - 1.4 mg/dL Final   04/24/2024 1.4 0.5 - 1.4 mg/dL Final   01/29/2024 2.0 (H) 0.5 - 1.4 mg/dL Final     eGFR   Date Value Ref Range Status   07/29/2024 23 (A) >60 mL/min/1.73 m^2 Final   04/24/2024 36.9 (A) >60 mL/min/1.73 m^2 Final   01/29/2024 24.0 (A) >60 mL/min/1.73 m^2 Final     Calcium   Date Value Ref Range Status   07/29/2024 9.9 8.7 - 10.5 mg/dL Final   04/24/2024 10.1 8.7 - 10.5 mg/dL Final   01/29/2024 10.3 8.7 - 10.5 mg/dL Final     Phosphorus   Date Value Ref Range Status   07/29/2024 3.8 2.7 - 4.5 mg/dL Final   04/24/2024 3.5 2.7 - 4.5 mg/dL Final     Albumin   Date Value Ref Range Status   07/29/2024 3.9 3.5 - 5.2 g/dL Final   04/24/2024 3.7 3.5 - 5.2 g/dL Final   01/29/2024 4.0 3.5 - 5.2 g/dL Final       PTH, Intact   Date Value Ref Range Status    07/29/2024 54.2 9.0 - 77.0 pg/mL Final   04/24/2024 42.4 9.0 - 77.0 pg/mL Final     Uric Acid   Date Value Ref Range Status   07/29/2024 11.6 (H) 2.4 - 5.7 mg/dL Final   04/24/2024 8.6 (H) 2.4 - 5.7 mg/dL Final       Hemoglobin   Date Value Ref Range Status   07/29/2024 12.5 12.0 - 16.0 g/dL Final   04/24/2024 13.5 12.0 - 16.0 g/dL Final   01/29/2024 14.4 12.0 - 16.0 g/dL Final       Prot/Creat Ratio, Urine   Date Value Ref Range Status   07/29/2024 0.22 (H) 0.00 - 0.20 Final   04/24/2024 Unable to calculate 0.00 - 0.20 Final           Renal US: 7/29/24:   The right kidney measures 10.8 cm, RI 0.77.  Left kidney measures 9.9 cm, RI 0.78.  Kidneys demonstrate no hydronephrosis.  There are 2 cysts of the right kidney the largest upper pole measuring 1.9 cm.  There are 2 small cyst lower pole left kidney the largest measuring 1.2 cm.         Impression/Plan:    Stage 3b chronic kidney disease  - baseline Cr 1.2-1.5 since 2009; clinically due to longstanding HTN/DM  - Cr christal to 1.6-2.0 from 8/2023-1/2024; suspecting this was related to volume status  - Cr 1.4 at last visit but up to 2.1 today; c/w DAVINA. Unclear etiology but suspecting this is either due to worsening edema (CRS) or hyperuricemia  - increasing lasix to 40mg BID x 7 days; then go back to 20mg BID  - also starting allopurinol (see below)  - close follow-up    DAVINA (acute kidney injury)  - see above    Lymphedema of both lower extremities  - significantly worse on exam today  - increasing lasix to 40mg BID x 1 week (or until swelling returns to baseline) then resume 20mg BID  - low salt diet  - close follow-up    Hyperuricemia  - no h/o gout or kidney stones  - uric acid 8.6 --> 11.6 today; worsening  - unsure if worsening is due to Nexlizet or DAVINA  - starting allopurinol 100mg daily today    Essential hypertension  - controlled; continue RAAS blockade     Renal cyst  - each kidney with 2 simple kidney cysts  - no need for follow-up per current  guidelines    OA  - recommended copper gloves to help with OA pain      Visit today included increased complexity associated with the care of the episodic problem DAVINA addressed and managing the longitudinal care of the patient due to the serious and/or complex managed problem(s) CKD.    Treatment options and plan were discussed with the patient and/or caregiver.   RTC 4 weeks.       Alison Gomez MD  [facility]  [phone]

## 2024-08-13 ENCOUNTER — OFFICE VISIT (OUTPATIENT)
Dept: NEPHROLOGY | Facility: CLINIC | Age: 84
End: 2024-08-13
Payer: MEDICARE

## 2024-08-13 VITALS
HEIGHT: 65 IN | DIASTOLIC BLOOD PRESSURE: 64 MMHG | HEART RATE: 73 BPM | BODY MASS INDEX: 30.97 KG/M2 | RESPIRATION RATE: 18 BRPM | SYSTOLIC BLOOD PRESSURE: 126 MMHG | WEIGHT: 185.88 LBS | OXYGEN SATURATION: 96 %

## 2024-08-13 DIAGNOSIS — E79.0 HYPERURICEMIA: ICD-10-CM

## 2024-08-13 DIAGNOSIS — I10 ESSENTIAL HYPERTENSION: ICD-10-CM

## 2024-08-13 DIAGNOSIS — I89.0 LYMPHEDEMA OF BOTH LOWER EXTREMITIES: ICD-10-CM

## 2024-08-13 DIAGNOSIS — N17.9 AKI (ACUTE KIDNEY INJURY): Primary | ICD-10-CM

## 2024-08-13 DIAGNOSIS — N28.1 RENAL CYST: ICD-10-CM

## 2024-08-13 DIAGNOSIS — N18.32 STAGE 3B CHRONIC KIDNEY DISEASE: ICD-10-CM

## 2024-08-13 PROCEDURE — 1160F RVW MEDS BY RX/DR IN RCRD: CPT | Mod: CPTII,S$GLB,, | Performed by: INTERNAL MEDICINE

## 2024-08-13 PROCEDURE — 3288F FALL RISK ASSESSMENT DOCD: CPT | Mod: CPTII,S$GLB,, | Performed by: INTERNAL MEDICINE

## 2024-08-13 PROCEDURE — 1159F MED LIST DOCD IN RCRD: CPT | Mod: CPTII,S$GLB,, | Performed by: INTERNAL MEDICINE

## 2024-08-13 PROCEDURE — 99214 OFFICE O/P EST MOD 30 MIN: CPT | Mod: S$GLB,,, | Performed by: INTERNAL MEDICINE

## 2024-08-13 PROCEDURE — G2211 COMPLEX E/M VISIT ADD ON: HCPCS | Mod: S$GLB,,, | Performed by: INTERNAL MEDICINE

## 2024-08-13 PROCEDURE — 1125F AMNT PAIN NOTED PAIN PRSNT: CPT | Mod: CPTII,S$GLB,, | Performed by: INTERNAL MEDICINE

## 2024-08-13 PROCEDURE — 99999 PR PBB SHADOW E&M-EST. PATIENT-LVL III: CPT | Mod: PBBFAC,,, | Performed by: INTERNAL MEDICINE

## 2024-08-13 PROCEDURE — 1101F PT FALLS ASSESS-DOCD LE1/YR: CPT | Mod: CPTII,S$GLB,, | Performed by: INTERNAL MEDICINE

## 2024-08-13 RX ORDER — ALLOPURINOL 100 MG/1
100 TABLET ORAL DAILY
Qty: 30 TABLET | Refills: 11 | Status: SHIPPED | OUTPATIENT
Start: 2024-08-13

## 2024-08-13 NOTE — Clinical Note
Good morning! I wanted to let you know I'm doubling her lasix dose for a week to see if this improves her swelling (which was significantly worse today compared to my exam in April). Just FYI!  Alison

## 2024-08-13 NOTE — PATIENT INSTRUCTIONS
Your kidney function is worse today. I am not sure if this is related to worsening leg swelling or to high uric acid levels.   Let's treat both today.     Start taking allopurinol 100mg daily. This medication will bring down your uric acid levels.    Increase your lasix to 2 tablets twice daily. Continue this until your swelling improves; then go back to 1 tablet twice daily.   Do not take the increased lasix dose for more than a week.

## 2024-08-13 NOTE — ASSESSMENT & PLAN NOTE
- baseline Cr 1.2-1.5 since 2009; clinically due to longstanding HTN/DM  - Cr christal to 1.6-2.0 from 8/2023-1/2024; suspecting this was related to volume status  - Cr 1.4 at last visit but up to 2.1 today; c/w DAVINA. Unclear etiology but suspecting this is either due to worsening edema (CRS) or hyperuricemia  - increasing lasix to 40mg BID x 7 days; then go back to 20mg BID  - also starting allopurinol (see below)  - close follow-up

## 2024-08-13 NOTE — ASSESSMENT & PLAN NOTE
- no h/o gout or kidney stones  - uric acid 8.6 --> 11.6 today; worsening  - unsure if worsening is due to Nexlizet or DAVINA  - starting allopurinol 100mg daily today

## 2024-08-13 NOTE — ASSESSMENT & PLAN NOTE
- significantly worse on exam today  - increasing lasix to 40mg BID x 1 week (or until swelling returns to baseline) then resume 20mg BID  - low salt diet  - close follow-up

## 2024-08-21 ENCOUNTER — OFFICE VISIT (OUTPATIENT)
Dept: FAMILY MEDICINE | Facility: CLINIC | Age: 84
End: 2024-08-21
Payer: MEDICARE

## 2024-08-21 VITALS
WEIGHT: 182.13 LBS | TEMPERATURE: 98 F | DIASTOLIC BLOOD PRESSURE: 62 MMHG | SYSTOLIC BLOOD PRESSURE: 122 MMHG | HEART RATE: 65 BPM | OXYGEN SATURATION: 96 % | HEIGHT: 65 IN | BODY MASS INDEX: 30.34 KG/M2

## 2024-08-21 DIAGNOSIS — E79.0 HYPERURICEMIA: ICD-10-CM

## 2024-08-21 DIAGNOSIS — E83.9 CHRONIC KIDNEY DISEASE-MINERAL AND BONE DISORDER (CKD-MBD): ICD-10-CM

## 2024-08-21 DIAGNOSIS — Z12.31 ENCOUNTER FOR SCREENING MAMMOGRAM FOR BREAST CANCER: ICD-10-CM

## 2024-08-21 DIAGNOSIS — N18.4 CHRONIC KIDNEY DISEASE (CKD), STAGE IV (SEVERE): ICD-10-CM

## 2024-08-21 DIAGNOSIS — N18.32 STAGE 3B CHRONIC KIDNEY DISEASE: ICD-10-CM

## 2024-08-21 DIAGNOSIS — Z12.12 SCREENING FOR COLORECTAL CANCER: ICD-10-CM

## 2024-08-21 DIAGNOSIS — N18.9 CHRONIC KIDNEY DISEASE-MINERAL AND BONE DISORDER (CKD-MBD): ICD-10-CM

## 2024-08-21 DIAGNOSIS — I10 HYPERTENSION, BENIGN: ICD-10-CM

## 2024-08-21 DIAGNOSIS — Z00.00 ROUTINE MEDICAL EXAM: Primary | ICD-10-CM

## 2024-08-21 DIAGNOSIS — Z12.11 SCREENING FOR COLORECTAL CANCER: ICD-10-CM

## 2024-08-21 DIAGNOSIS — K21.9 GASTROESOPHAGEAL REFLUX DISEASE WITHOUT ESOPHAGITIS: ICD-10-CM

## 2024-08-21 DIAGNOSIS — M89.9 CHRONIC KIDNEY DISEASE-MINERAL AND BONE DISORDER (CKD-MBD): ICD-10-CM

## 2024-08-21 DIAGNOSIS — I87.2 VENOUS STASIS DERMATITIS OF BOTH LOWER EXTREMITIES: ICD-10-CM

## 2024-08-21 DIAGNOSIS — E11.69 MIXED DIABETIC HYPERLIPIDEMIA ASSOCIATED WITH TYPE 2 DIABETES MELLITUS: ICD-10-CM

## 2024-08-21 DIAGNOSIS — M81.0 OSTEOPOROSIS, POSTMENOPAUSAL: ICD-10-CM

## 2024-08-21 DIAGNOSIS — I70.0 AORTIC ATHEROSCLEROSIS: ICD-10-CM

## 2024-08-21 DIAGNOSIS — E55.9 VITAMIN D DEFICIENCY DISEASE: ICD-10-CM

## 2024-08-21 DIAGNOSIS — E78.2 MIXED DIABETIC HYPERLIPIDEMIA ASSOCIATED WITH TYPE 2 DIABETES MELLITUS: ICD-10-CM

## 2024-08-21 DIAGNOSIS — R60.0 PERIPHERAL EDEMA: ICD-10-CM

## 2024-08-21 DIAGNOSIS — I10 ESSENTIAL HYPERTENSION: ICD-10-CM

## 2024-08-21 DIAGNOSIS — N17.9 AKI (ACUTE KIDNEY INJURY): ICD-10-CM

## 2024-08-21 DIAGNOSIS — E11.22 CONTROLLED TYPE 2 DIABETES MELLITUS WITH STAGE 4 CHRONIC KIDNEY DISEASE, UNSPECIFIED WHETHER LONG TERM INSULIN USE: ICD-10-CM

## 2024-08-21 DIAGNOSIS — M79.10 MYALGIA DUE TO STATIN: ICD-10-CM

## 2024-08-21 DIAGNOSIS — T46.6X5A MYALGIA DUE TO STATIN: ICD-10-CM

## 2024-08-21 DIAGNOSIS — N18.4 CONTROLLED TYPE 2 DIABETES MELLITUS WITH STAGE 4 CHRONIC KIDNEY DISEASE, UNSPECIFIED WHETHER LONG TERM INSULIN USE: ICD-10-CM

## 2024-08-21 PROCEDURE — 1125F AMNT PAIN NOTED PAIN PRSNT: CPT | Mod: CPTII,S$GLB,, | Performed by: INTERNAL MEDICINE

## 2024-08-21 PROCEDURE — 99397 PER PM REEVAL EST PAT 65+ YR: CPT | Mod: 25,S$GLB,, | Performed by: INTERNAL MEDICINE

## 2024-08-21 PROCEDURE — 99214 OFFICE O/P EST MOD 30 MIN: CPT | Mod: 25,S$GLB,, | Performed by: INTERNAL MEDICINE

## 2024-08-21 PROCEDURE — 99999 PR PBB SHADOW E&M-EST. PATIENT-LVL III: CPT | Mod: PBBFAC,,, | Performed by: INTERNAL MEDICINE

## 2024-08-21 PROCEDURE — 1101F PT FALLS ASSESS-DOCD LE1/YR: CPT | Mod: CPTII,S$GLB,, | Performed by: INTERNAL MEDICINE

## 2024-08-21 PROCEDURE — 3288F FALL RISK ASSESSMENT DOCD: CPT | Mod: CPTII,S$GLB,, | Performed by: INTERNAL MEDICINE

## 2024-08-21 PROCEDURE — 1159F MED LIST DOCD IN RCRD: CPT | Mod: CPTII,S$GLB,, | Performed by: INTERNAL MEDICINE

## 2024-08-21 RX ORDER — OMEPRAZOLE 20 MG/1
20 CAPSULE, DELAYED RELEASE ORAL DAILY
Qty: 90 CAPSULE | Refills: 3 | Status: SHIPPED | OUTPATIENT
Start: 2024-08-21

## 2024-08-21 RX ORDER — FUROSEMIDE 20 MG/1
TABLET ORAL
Qty: 180 TABLET | Refills: 3 | Status: CANCELLED | OUTPATIENT
Start: 2024-08-21

## 2024-08-21 RX ORDER — METOPROLOL TARTRATE 100 MG/1
100 TABLET ORAL 2 TIMES DAILY
Qty: 180 TABLET | Refills: 3 | Status: SHIPPED | OUTPATIENT
Start: 2024-08-21

## 2024-08-21 RX ORDER — ALLOPURINOL 100 MG/1
100 TABLET ORAL DAILY
Qty: 90 TABLET | Refills: 3 | Status: SHIPPED | OUTPATIENT
Start: 2024-08-21

## 2024-08-21 NOTE — PROGRESS NOTES
Chief complaint:  Physical,     84-year-old white female new to me 3/22.  Her  is a patient of mine and he was in the hospital 3/22 having had MI and eventually did pass.  From health maintenance ..  Her colonoscopy was normal in 2013 apparently.    mammo 11/23,     colon nl 2013. .     A1c 6.6, 6.5, 6.4, 6.5 April. lipids not treated until now      ROS:   CONST: weight stable. EYES: no vision change. ENT: no sore throat. CV: no chest pain w/ exertion. RESP: no shortness of breath. GI: no nausea, vomiting, diarrhea. No dysphagia. : no urinary issues. MUSCULOSKELETAL: no new myalgias or arthralgias. SKIN: no new changes. NEURO: no focal deficits. PSYCH: no new issues. ENDOCRINE: no polyuria. HEME: no lymph nodes. ALLERGY: no general pruritis.    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    Osteopenia 3/10/2022    Type 2 diabetes mellitus with stage 3 chronic kidney disease, without long-term current use of insulin 10/17/2017    Cannot tolerate metformin     Type 2 diabetes mellitus without complication 10/17/2017    Venous stasis dermatitis of both lower extremities 12/11/2019     Past Surgical History:   Procedure Laterality Date    APPENDECTOMY      BREAST BIOPSY Bilateral     CATARACT EXTRACTION  1993/2000    od/os    COLONOSCOPY      HYSTERECTOMY  07/1972    ORBITAL FRACTURE SURGERY  12/1998    os dr coleman    PARTIAL HYSTERECTOMY      RIB FRACTURE SURGERY      TONSILLECTOMY, ADENOIDECTOMY       Social History     Socioeconomic History    Marital status:    Tobacco Use    Smoking status: Never    Smokeless tobacco: Never   Substance and Sexual Activity    Alcohol use: No     Alcohol/week: 0.0 standard drinks of alcohol    Drug use: No     Social Determinants of Health      Financial Resource Strain: Low Risk  (8/8/2024)    Overall Financial Resource Strain (CARDIA)     Difficulty of Paying Living Expenses: Not very hard   Food Insecurity: No Food Insecurity (8/8/2024)    Hunger Vital Sign     Worried About Running Out of Food in the Last Year: Never true     Ran Out of Food in the Last Year: Never true   Physical Activity: Inactive (8/8/2024)    Exercise Vital Sign     Days of Exercise per Week: 0 days     Minutes of Exercise per Session: 0 min   Stress: No Stress Concern Present (8/8/2024)    [employer] - Occupational Stress Questionnaire     Feeling of Stress : Only a little   Housing Stability: Unknown (8/8/2024)    Housing Stability Vital Sign     Unable to Pay for Housing in the Last Year: No     family history includes Breast cancer in her mother; Cancer in her mother; Cataracts in her father; Hypertension in her father; No Known Problems in her brother, maternal aunt, maternal grandfather, maternal grandmother, maternal uncle, paternal aunt, paternal grandfather, paternal grandmother, paternal uncle, and sister.      Gen: no distress  EYES: conjunctiva clear, non-icteric, PERRL  ENT: nose clear, nasal mucosa normal, oropharynx clear and moist, teeth good  NECK:supple, thyroid non-palpable  RESP: effort is good, lungs clear  CV: heart RRR w/o murmur, gallops or rubs; no carotid bruits, 2+ edema bilat  GI: abdomen soft, non-distended, non-tender, no hepatosplenomegaly  MS: gait normal, no clubbing or cyanosis of the digits  SKIN: no rashes, warm to touch, .    Labs and x-ray reports reviewed    Char was seen today for establish care.    Diagnoses and all orders for this visit:    Routine medical exam, new to me, up-to-date on breast and colon cancer screening.  Will discuss  if indeed she wants to pursue another colonoscopy which may not be indicated based on her age.  Obviously may do so if indeed having anemia and so forth      Encounter for  screening mammogram for breast cancer    Screening for colorectal cancer- no scope based on age                                                             Additional evaluation and management issues:    Additionally patient has numerous other medical issues to address separate from her physical.  She does appear to have diabetes.  Her A1c a year ago was 6.6 we discussed getting one at least every six months and if uncontrolled every three months.    Blood pressure appears to be under good control.    She does appear to have an LDL not at goal and denies ever having been on medications we discussed the benefits of being on a statin for heart attack and stroke reduction in diabetics.  Her lipids also would have guideline indications for statin therapy     Bulk order lipid w LDL 140s- pt upset she needs to go back, arm still sore from blood draw.  She will come back next week.  Explained for her to call and make sure we put all lab work it.  Looks like she went online and saw that labs were ordered and came in and had them done. Cards has her on new med    She has had some problems with the edema lately.  Sounds like she elevates them intermittently only 3 hours per day.  She has stockings but can not get them on so does not use them.  She takes 20 mg of Lasix daily.  We discussed she might need to increase it to 40 on some days but if indeed it is more consistently 40 kg we might need to again do more blood work.  She can use visible swelling and especially when it starts to leak fluids as a sign to take more Lasix.    She does have venous stasis dermatitis but better with edema treatment.  Apparently she did get a prescription for a prescription support stocking but is too tight for her to get on.  She has even purchase some over-the-counter but they were too tight to get on at the ankle but she will continue to pursue trying to find some that she might be able to get on.  We again discussed pathophysiology of leg  elevation and that the Lasix 20 mg as she takes daily will not pull the fluid out of her leg.  She will try to do more leg elevation.  We discussed her renal insufficiency and how the Lasix can worsen that we may well need to reduce the frequency of the Lasix should she have worsening kidney disease.  I explained to her stage 3 chronic kidney disease which may be age related and so forth but the Lasix may be contributing some. May need 40 mg lasix prn as she gets blisters and leaking at times, never had ECHO, so CHF sx at this time    Osteopenia reviewed.  Eventual bone density future likely indicated    Also discussed her situational anxiety.  She does occasionally shake and she was embarrassed when she was eating one time.  She does not appear to have a baseline tremor suspicious for Parkinson's.  Her  is in the hospital right now having had an MI and apparently is very short of breath and deconditioned.  He may well need to go to skilled nursing.  I had reviewed his chart as well since he is a patient of mine reassured her it looks like he is improving by review of the records.  She sometimes gets stressed and request something for anxiety.  She does not appear to have anxiety disorder in need of an SSRI but considered.  For now we will give her some low-dose Ativan explaining potential side effects, increase fall risk, sedation and so forth and she you would only use it when she is nervous and anxious possibly even at night as a sleep aid if indeed she is anxious and thinking about things too much.  That may be the perfect time for.    Patient had been cutting down her Atacand eventually stopped it.  Patient tried other ARB such as valsartan.   She was still having episodes of dizziness for sound like they were even in bed med made her feel off balance and I wonder if it was not vertigo.  She quit the medication a year ago and she has had very little if any dizziness.  Her dizziness did occur with  turning.  Blood pressure readings off med are 136 up to 150 but really not her normal.  Pulse is 50 4-60 so on the metoprolol.  She has edema requiring her to take her Lasix and so adding Norvasc would be problematic.  She is already on Lasix so adding HCTZ would be problematic.  We discussed trying some lisinopril 10 mg and did so a year ago.  Looks like blood pressure still up so will increase the 20 mg     .  I encouraged her to monitor more often and especially when dizzy so we can't really assess if she is having hypotension as I strongly suspect this was intermittent vertigo while on the blood pressure pills.    Evaluation and management of all the separate issues will be based on medical decision making as below    Labs x-ray reports reviewed    8/24   Compared with previous DXA, BMD at the lumbar spine has increased by 6.9%.     Femoral neck:                          T-score is -1.4, and Z-score is +1.1.     Total hip:                                T-score is +0.1, and Z-score is +2.4.     Compared with previous DXA, BMD at the total hip has remained stable.        Fracture Risk (FRAX)     12% risk of a major osteoporotic fracture in the next 10 years.     3.2% risk of hip fracture in the next 10 years.     Impression:     *Osteoporosis based on T-score between -1.0 and -2.5 and elevated risk based on FRAX  *Fracture risk is high.     RECOMMENDATIONS:  *Daily calcium intake 1480-7807 mg, dietary sources preferred; Vitamin D 8845-9837 IU daily.  *Weight bearing exercise and fall precautions.  *Recommend medical therapy for osteoporosis with high risk for fracture. Consider bisphosphonates (alendronate, risedronate, zoledronic acid), or denosumab.  *Repeat BMD in 2 years.                        Assessment plan:           Essential hypertension ,chronic condition and stable.  -     TSH; Future    Mixed diabetic hyperlipidemia associated with type 2 diabetes mellitus, on med, check Nov    Peripheral edema  ,chronic condition and stable. Has pump TID and wears stockings    Chronic kidney disease (CKD), stage IV (severe)    Aortic atherosclerosis ,chronic condition and stable.    Chronic kidney disease-mineral and bone disorder (CKD-MBD) ,chronic condition and stable.    Osteoporosis, postmenopausal, really osteopenia, stable    Hyperuricemia  -     allopurinoL (ZYLOPRIM) 100 MG tablet; Take 1 tablet (100 mg total) by mouth once daily.    Venous stasis dermatitis of both lower extremities ,chronic condition and stable.    Myalgia due to statin    Controlled type 2 diabetes mellitus with stage 4 chronic kidney disease, unspecified whether long term insulin use  -     Hemoglobin A1C; Future    Vitamin D deficiency disease  -     Vitamin D; Future    Gastroesophageal reflux disease without esophagitis  -     omeprazole (PRILOSEC) 20 MG capsule; Take 1 capsule (20 mg total) by mouth once daily.    Hypertension, benign  -     metoprolol tartrate (LOPRESSOR) 100 MG tablet; Take 1 tablet (100 mg total) by mouth 2 (two) times daily.    DAVINA (acute kidney injury)  -     allopurinoL (ZYLOPRIM) 100 MG tablet; Take 1 tablet (100 mg total) by mouth once daily.    Stage 3b chronic kidney disease  -     allopurinoL (ZYLOPRIM) 100 MG tablet; Take 1 tablet (100 mg total) by mouth once daily.    Other orders  The following orders have not been finalized:  -     Cancel: furosemide (LASIX) 20 MG tablet

## 2024-08-22 ENCOUNTER — OFFICE VISIT (OUTPATIENT)
Dept: PODIATRY | Facility: CLINIC | Age: 84
End: 2024-08-22
Payer: MEDICARE

## 2024-08-22 VITALS — BODY MASS INDEX: 30.34 KG/M2 | HEIGHT: 65 IN | WEIGHT: 182.13 LBS

## 2024-08-22 DIAGNOSIS — I89.0 LYMPHEDEMA OF BOTH LOWER EXTREMITIES: Primary | ICD-10-CM

## 2024-08-22 DIAGNOSIS — B35.1 ONYCHOMYCOSIS: ICD-10-CM

## 2024-08-22 PROCEDURE — 99999 PR PBB SHADOW E&M-EST. PATIENT-LVL III: CPT | Mod: PBBFAC,,, | Performed by: PODIATRIST

## 2024-08-22 NOTE — PROGRESS NOTES
Subjective:     Patient ID: Char Savage is a 84 y.o. female.    Chief Complaint: Diabetes Mellitus (8/21/24 Dr Vickers) and Nail Care    Char is a 84 y.o. female who presents to the clinic for evaluation and treatment of high risk feet. Char has a past medical history of Benign hypertension with chronic kidney disease, stage III (4/14/2015), Hyperlipidemia, Hypertension, Left eye injury (12/98), Nevus of choroid, Osteopenia (3/10/2022), Type 2 diabetes mellitus with stage 3 chronic kidney disease, without long-term current use of insulin (10/17/2017), Type 2 diabetes mellitus without complication (10/17/2017), and Venous stasis dermatitis of both lower extremities (12/11/2019). The patient's chief complaint is long, thick toenails. This patient has documented high risk feet requiring routine maintenance secondary to diabetes mellitis and those secondary complications of diabetes, as mentioned..    PCP: Joselito Vickers MD    Date Last Seen by PCP: per above        Hemoglobin A1C   Date Value Ref Range Status   04/24/2024 6.5 (H) 4.0 - 5.6 % Final     Comment:     ADA Screening Guidelines:  5.7-6.4%  Consistent with prediabetes  >or=6.5%  Consistent with diabetes    High levels of fetal hemoglobin interfere with the HbA1C  assay. Heterozygous hemoglobin variants (HbS, HgC, etc)do  not significantly interfere with this assay.   However, presence of multiple variants may affect accuracy.     08/02/2023 6.5 (H) 4.0 - 5.6 % Final     Comment:     ADA Screening Guidelines:  5.7-6.4%  Consistent with prediabetes  >or=6.5%  Consistent with diabetes    High levels of fetal hemoglobin interfere with the HbA1C  assay. Heterozygous hemoglobin variants (HbS, HgC, etc)do  not significantly interfere with this assay.   However, presence of multiple variants may affect accuracy.     03/10/2022 6.5 (H) 4.0 - 5.6 % Final     Comment:     ADA Screening Guidelines:  5.7-6.4%  Consistent with prediabetes  >or=6.5%   Consistent with diabetes    High levels of fetal hemoglobin interfere with the HbA1C  assay. Heterozygous hemoglobin variants (HbS, HgC, etc)do  not significantly interfere with this assay.   However, presence of multiple variants may affect accuracy.         Review of Systems   Constitutional: Negative for chills.   Cardiovascular:  Negative for chest pain and claudication.   Respiratory:  Negative for cough.    Skin:  Positive for color change, dry skin and nail changes.   Musculoskeletal:  Positive for joint pain.   Gastrointestinal:  Negative for nausea.   Neurological:  Positive for paresthesias. Negative for numbness.   Psychiatric/Behavioral:  The patient is not nervous/anxious.         Objective:     Physical Exam  Constitutional:       Appearance: She is well-developed.      Comments: Oriented to time, place, and person.   Cardiovascular:      Comments: DP and PT pulses are palpable bilaterally. 3 sec capillary refill time and toes and feet are warm to touch proximally .  There is  hair growth on the feet and toes b/l. Edema noted B/L     Musculoskeletal:      Comments: Equinus noted b/l ankles with < 10 deg DF noted. MMT 5/5 in DF/PF/Inv/Ev resistance with no reproduction of pain in any direction. Passive range of motion of ankle and pedal joints is painless b/l.     Feet:      Right foot:      Skin integrity: No callus or dry skin.      Left foot:      Skin integrity: No callus or dry skin.   Lymphadenopathy:      Comments: Negative lymphadenopathy bilateral popliteal fossa and tarsal tunnel.   Skin:     Comments: Toenails 1-5 bilaterally are elongated by 2-3 mm, thickened by 2-3 mm, discolored/yellowed, dystrophic, brittle with subungual debris.     Neurological:      Mental Status: She is alert.      Comments: Light touch, proprioception, and sharp/dull sensation are all intact bilaterally. Protective threshold with the [place]-Wienstein monofilament is intact bilaterally.    Psychiatric:          Behavior: Behavior is cooperative.           Assessment:      No diagnosis found.      Plan:     There are no diagnoses linked to this encounter.      I counseled the patient on her conditions, their implications and medical management.    - Shoe inspection. Diabetic Foot Education. Patient reminded of the importance of good nutrition and blood sugar control to help prevent podiatric complications of diabetes. Patient instructed on proper foot hygeine. We discussed wearing proper shoe gear, daily foot inspections, never walking without protective shoe gear, never putting sharp instruments to feet, routine podiatric nail visits every 2-3 months.   - With patient's permission, nails were aggressively reduced and debrided x 10 to their soft tissue attachment mechanically and with electric , removing all offending nail and debris. Patient relates relief following the procedure. He will continue to monitor the areas daily, inspect his feet, wear protective shoe gear when ambulatory, moisturizer to maintain skin integrity and follow in this office in approximately 2-3 months, sooner p.r.n.

## 2024-09-03 ENCOUNTER — CLINICAL SUPPORT (OUTPATIENT)
Dept: AUDIOLOGY | Facility: CLINIC | Age: 84
End: 2024-09-03
Payer: MEDICARE

## 2024-09-03 DIAGNOSIS — H91.90 DECREASED HEARING, UNSPECIFIED LATERALITY: ICD-10-CM

## 2024-09-03 DIAGNOSIS — H90.6 MIXED CONDUCTIVE AND SENSORINEURAL HEARING LOSS OF BOTH EARS: Primary | ICD-10-CM

## 2024-09-03 PROCEDURE — 92557 COMPREHENSIVE HEARING TEST: CPT | Mod: S$GLB,,,

## 2024-09-03 PROCEDURE — 92567 TYMPANOMETRY: CPT | Mod: S$GLB,,,

## 2024-09-03 NOTE — PROGRESS NOTES
Please click on link to view Audiogram:  Document on 9/3/2024 11:04 AM by Lexus Olmos AU.D: Audiogram    Ms. Char Savage, a 84 y.o. female, was seen in the clinic today for an audiological evaluation. Ms. Savage's main complaint was bilateral hearing loss, worse for her left ear. Patient also reported bilateral tinnitus.    Otoscopy clear bilaterally. Tympanometry testing revealed a Type A tympanogram for the right ear and a Type A tympanogram with borderline clinically significant negative middle ear pressure for the left ear.     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 A speech reception threshold was obtained at 50 dBHL for the right ear and at 60 dBHL for the left ear. Speech discrimination was 96% for the right ear and 92% for the left ear.      Today's results were discussed with the patient. Patient expressed understanding of hearing test results and recommendations.    Recommendations:  1. Otologic evaluation for mixed hearing loss  2. Annual audiological evaluation or sooner if hearing changes  3. Hearing protection when in noise   4. Utilize ReSQueryday Relief blade and other tinnitus management strategies discussed during appointment (lower salt/caffeine intake, monitor stress/anxiety levels, soft background noise in quiet)  5. Hearing aid consultation following medical clearance

## 2024-09-04 ENCOUNTER — PATIENT MESSAGE (OUTPATIENT)
Dept: NEPHROLOGY | Facility: CLINIC | Age: 84
End: 2024-09-04
Payer: MEDICARE

## 2024-09-26 ENCOUNTER — LAB VISIT (OUTPATIENT)
Dept: LAB | Facility: HOSPITAL | Age: 84
End: 2024-09-26
Attending: INTERNAL MEDICINE
Payer: MEDICARE

## 2024-09-26 DIAGNOSIS — I89.0 LYMPHEDEMA OF BOTH LOWER EXTREMITIES: ICD-10-CM

## 2024-09-26 DIAGNOSIS — E79.0 HYPERURICEMIA: ICD-10-CM

## 2024-09-26 DIAGNOSIS — N17.9 AKI (ACUTE KIDNEY INJURY): ICD-10-CM

## 2024-09-26 DIAGNOSIS — N18.32 STAGE 3B CHRONIC KIDNEY DISEASE: ICD-10-CM

## 2024-09-26 LAB
ALBUMIN SERPL BCP-MCNC: 3.6 G/DL (ref 3.5–5.2)
ANION GAP SERPL CALC-SCNC: 12 MMOL/L (ref 8–16)
BUN SERPL-MCNC: 32 MG/DL (ref 8–23)
CALCIUM SERPL-MCNC: 9.8 MG/DL (ref 8.7–10.5)
CHLORIDE SERPL-SCNC: 104 MMOL/L (ref 95–110)
CO2 SERPL-SCNC: 24 MMOL/L (ref 23–29)
CREAT SERPL-MCNC: 2.2 MG/DL (ref 0.5–1.4)
EST. GFR  (NO RACE VARIABLE): 21 ML/MIN/1.73 M^2
GLUCOSE SERPL-MCNC: 115 MG/DL (ref 70–110)
PHOSPHATE SERPL-MCNC: 3.7 MG/DL (ref 2.7–4.5)
POTASSIUM SERPL-SCNC: 4.6 MMOL/L (ref 3.5–5.1)
SODIUM SERPL-SCNC: 140 MMOL/L (ref 136–145)
URATE SERPL-MCNC: 11.1 MG/DL (ref 2.4–5.7)

## 2024-09-26 PROCEDURE — 84550 ASSAY OF BLOOD/URIC ACID: CPT | Performed by: INTERNAL MEDICINE

## 2024-09-26 PROCEDURE — 36415 COLL VENOUS BLD VENIPUNCTURE: CPT | Performed by: INTERNAL MEDICINE

## 2024-09-26 PROCEDURE — 80069 RENAL FUNCTION PANEL: CPT | Performed by: INTERNAL MEDICINE

## 2024-10-04 ENCOUNTER — OFFICE VISIT (OUTPATIENT)
Dept: NEPHROLOGY | Facility: CLINIC | Age: 84
End: 2024-10-04
Payer: MEDICARE

## 2024-10-04 VITALS
BODY MASS INDEX: 29.97 KG/M2 | SYSTOLIC BLOOD PRESSURE: 126 MMHG | WEIGHT: 179.88 LBS | OXYGEN SATURATION: 97 % | DIASTOLIC BLOOD PRESSURE: 70 MMHG | RESPIRATION RATE: 18 BRPM | HEART RATE: 60 BPM | HEIGHT: 65 IN

## 2024-10-04 DIAGNOSIS — N18.32 STAGE 3B CHRONIC KIDNEY DISEASE: Primary | ICD-10-CM

## 2024-10-04 DIAGNOSIS — I10 ESSENTIAL HYPERTENSION: ICD-10-CM

## 2024-10-04 DIAGNOSIS — E79.0 HYPERURICEMIA: ICD-10-CM

## 2024-10-04 DIAGNOSIS — I89.0 LYMPHEDEMA OF BOTH LOWER EXTREMITIES: ICD-10-CM

## 2024-10-04 DIAGNOSIS — N28.1 RENAL CYST: ICD-10-CM

## 2024-10-04 PROCEDURE — 1126F AMNT PAIN NOTED NONE PRSNT: CPT | Mod: CPTII,S$GLB,, | Performed by: INTERNAL MEDICINE

## 2024-10-04 PROCEDURE — 99214 OFFICE O/P EST MOD 30 MIN: CPT | Mod: S$GLB,,, | Performed by: INTERNAL MEDICINE

## 2024-10-04 PROCEDURE — 1101F PT FALLS ASSESS-DOCD LE1/YR: CPT | Mod: CPTII,S$GLB,, | Performed by: INTERNAL MEDICINE

## 2024-10-04 PROCEDURE — 3288F FALL RISK ASSESSMENT DOCD: CPT | Mod: CPTII,S$GLB,, | Performed by: INTERNAL MEDICINE

## 2024-10-04 PROCEDURE — G2211 COMPLEX E/M VISIT ADD ON: HCPCS | Mod: S$GLB,,, | Performed by: INTERNAL MEDICINE

## 2024-10-04 PROCEDURE — 1159F MED LIST DOCD IN RCRD: CPT | Mod: CPTII,S$GLB,, | Performed by: INTERNAL MEDICINE

## 2024-10-04 PROCEDURE — 1160F RVW MEDS BY RX/DR IN RCRD: CPT | Mod: CPTII,S$GLB,, | Performed by: INTERNAL MEDICINE

## 2024-10-04 PROCEDURE — 99999 PR PBB SHADOW E&M-EST. PATIENT-LVL IV: CPT | Mod: PBBFAC,,, | Performed by: INTERNAL MEDICINE

## 2024-10-04 RX ORDER — FEBUXOSTAT 40 MG/1
40 TABLET, FILM COATED ORAL DAILY
Qty: 30 TABLET | Refills: 11 | Status: SHIPPED | OUTPATIENT
Start: 2024-10-04

## 2024-10-04 NOTE — PROGRESS NOTES
[facility]  [address] [facility] [address], [address]  [place]  [phone]    PCP: Joselito Vickers MD  Cardiologist: Dmitry       HPI: Mrs. Char Savage is a 84 y.o. female with HTN, T2DM, and lymphedema who is here for established patient evaluation of Chronic Kidney Disease  Initial visit 4/30/24. Her baseline Cr has been 1.2-1.5 since 2009; though christal to 1.6-2.0 from 8/2023-1/2024. Her Cr is 1.4 today. No proteinuria.  She reports her T2DM has been well-controlled. Average A1c ~6.5% since 2016. She is not currently on diabetic medication. She is on lisinopril for HTN.  She denies h/o gout, kidney stones, or NSAID use. No family h/o kidney disease.   Her main concern is BLE edema which began ~2019. She reports undergoing extensive workup which was unremarkable. She was ultimately diagnosed with lymphedema. She wears compression socks and uses a lymphedema pump. She notes this has significantly improved her symptoms and has also improved her balance and ability to get around. She reports previously having weeping edema up to her knees; she no longer has drainage and edema only goes up to her shins.   She takes lasix 20mg BID. She notes augmented UOP. She was briefly on lasix 40mg BID; she noted increased UOP but no change in lymphedema on this regimen. She tries to follow a low salt diet.  She is hoping to get her ankle edema further improved.     4/30/24: reinforced low salt diet.      Interval Hx:   LV 8/13/24: tense edema; increased lasix from 20mg BID to 40mg BID x 7 days, then resumed 20mg BID. Started allopurinol for uric acid > 11.   About 1.5 weeks after starting allopurinol, she developed a rash. Medication was stopped and rash resolved. She remains on Dexilant.    She has started drinking decaf coffee.   She denies h/o gout.   Edema has improved; she is back on lasix 20mg BID.  Uric acid is still > 11; she denies any h/o heart disease or heart blockages.   +constipation.    ROS:  Complete ROS  "otherwise negative except as indicated above.         Current Outpatient Medications:     aspirin (ECOTRIN) 81 MG EC tablet, Take 81 mg by mouth once daily., Disp: , Rfl:     bempedoic acid-ezetimibe 180-10 mg Tab, Take 1 tablet by mouth daily., Disp: 30 tablet, Rfl: 11    furosemide (LASIX) 20 MG tablet, TAKE 1 TABLET BY MOUTH TWICE A DAY AS NEEDED FOR LEG SWELLING, Disp: 180 tablet, Rfl: 3    lisinopriL (PRINIVIL,ZESTRIL) 20 MG tablet, Take 1 tablet (20 mg total) by mouth once daily., Disp: 90 tablet, Rfl: 1    metoprolol tartrate (LOPRESSOR) 100 MG tablet, Take 1 tablet (100 mg total) by mouth 2 (two) times daily., Disp: 180 tablet, Rfl: 3    multivit-minerals/folic acid (CENTRUM ADULT 50 PLUS ORAL), Take by mouth once daily., Disp: , Rfl:     omeprazole (PRILOSEC) 20 MG capsule, Take 1 capsule (20 mg total) by mouth once daily., Disp: 90 capsule, Rfl: 3    triamcinolone acetonide 0.1% (KENALOG) 0.1 % cream, Apply topically 2 (two) times daily., Disp: 2000 g, Rfl: 2    febuxostat (ULORIC) 40 mg Tab, Take 1 tablet (40 mg total) by mouth once daily., Disp: 30 tablet, Rfl: 11      Vitals: Blood pressure 126/70, pulse 60, resp. rate 18, height 5' 5" (1.651 m), weight 81.6 kg (179 lb 14.3 oz), SpO2 97%. Body mass index is 29.94 kg/m².    Physical Exam  Vitals reviewed.   Constitutional:       General: She is awake. She is not in acute distress.     Appearance: Normal appearance. She is well-developed.   HENT:      Head: Normocephalic and atraumatic.      Nose: Nose normal.      Mouth/Throat:      Mouth: Mucous membranes are moist.   Eyes:      Extraocular Movements: Extraocular movements intact.      Conjunctiva/sclera: Conjunctivae normal.   Pulmonary:      Effort: Pulmonary effort is normal.   Musculoskeletal:         General: No tenderness or signs of injury.      Right lower leg: Edema present.      Left lower leg: Edema present.      Comments: BLE with mild pitting edema; significantly improved compared to last " visit   Skin:     General: Skin is warm and dry.      Findings: No erythema or rash.   Neurological:      General: No focal deficit present.      Mental Status: She is alert. Mental status is at baseline.   Psychiatric:         Mood and Affect: Mood normal.         Behavior: Behavior normal.           Labs/Imaging:  Sodium   Date Value Ref Range Status   09/26/2024 140 136 - 145 mmol/L Final   07/29/2024 140 136 - 145 mmol/L Final   04/24/2024 140 136 - 145 mmol/L Final     Potassium   Date Value Ref Range Status   09/26/2024 4.6 3.5 - 5.1 mmol/L Final   07/29/2024 4.5 3.5 - 5.1 mmol/L Final   04/24/2024 4.8 3.5 - 5.1 mmol/L Final     Chloride   Date Value Ref Range Status   09/26/2024 104 95 - 110 mmol/L Final   07/29/2024 104 95 - 110 mmol/L Final   04/24/2024 105 95 - 110 mmol/L Final     CO2   Date Value Ref Range Status   09/26/2024 24 23 - 29 mmol/L Final   07/29/2024 25 23 - 29 mmol/L Final   04/24/2024 27 23 - 29 mmol/L Final     BUN   Date Value Ref Range Status   09/26/2024 32 (H) 8 - 23 mg/dL Final   07/29/2024 33 (H) 8 - 23 mg/dL Final   04/24/2024 17 8 - 23 mg/dL Final     Creatinine   Date Value Ref Range Status   09/26/2024 2.2 (H) 0.5 - 1.4 mg/dL Final   07/29/2024 2.1 (H) 0.5 - 1.4 mg/dL Final   04/24/2024 1.4 0.5 - 1.4 mg/dL Final     eGFR   Date Value Ref Range Status   09/26/2024 21 (A) >60 mL/min/1.73 m^2 Final   07/29/2024 23 (A) >60 mL/min/1.73 m^2 Final   04/24/2024 36.9 (A) >60 mL/min/1.73 m^2 Final     Calcium   Date Value Ref Range Status   09/26/2024 9.8 8.7 - 10.5 mg/dL Final   07/29/2024 9.9 8.7 - 10.5 mg/dL Final   04/24/2024 10.1 8.7 - 10.5 mg/dL Final     Phosphorus   Date Value Ref Range Status   09/26/2024 3.7 2.7 - 4.5 mg/dL Final   07/29/2024 3.8 2.7 - 4.5 mg/dL Final   04/24/2024 3.5 2.7 - 4.5 mg/dL Final     Albumin   Date Value Ref Range Status   09/26/2024 3.6 3.5 - 5.2 g/dL Final   07/29/2024 3.9 3.5 - 5.2 g/dL Final   04/24/2024 3.7 3.5 - 5.2 g/dL Final       PTH, Intact    Date Value Ref Range Status   07/29/2024 54.2 9.0 - 77.0 pg/mL Final   04/24/2024 42.4 9.0 - 77.0 pg/mL Final     Uric Acid   Date Value Ref Range Status   09/26/2024 11.1 (H) 2.4 - 5.7 mg/dL Final   07/29/2024 11.6 (H) 2.4 - 5.7 mg/dL Final   04/24/2024 8.6 (H) 2.4 - 5.7 mg/dL Final       Hemoglobin   Date Value Ref Range Status   07/29/2024 12.5 12.0 - 16.0 g/dL Final   04/24/2024 13.5 12.0 - 16.0 g/dL Final   01/29/2024 14.4 12.0 - 16.0 g/dL Final       Prot/Creat Ratio, Urine   Date Value Ref Range Status   07/29/2024 0.22 (H) 0.00 - 0.20 Final   04/24/2024 Unable to calculate 0.00 - 0.20 Final         Renal US: 7/29/24:   The right kidney measures 10.8 cm, RI 0.77.  Left kidney measures 9.9 cm, RI 0.78.  Kidneys demonstrate no hydronephrosis.  There are 2 cysts of the right kidney the largest upper pole measuring 1.9 cm.  There are 2 small cyst lower pole left kidney the largest measuring 1.2 cm.      Impression/Plan:    Stage 3b chronic kidney disease  - baseline Cr 1.2-1.5 since 2009; clinically due to longstanding HTN/DM  - Cr christal to 1.6-2.0 from 8/2023-1/2024; suspecting this was related to volume status  - Cr 1.4 --> 2.1 --> 2.2 today; stable/not improved. Fluctuations in Cr tend to correlated with uric acid levels  - didn't tolerate allopurinol  - cautiously starting Uloric  - continue lasix 20mg BID    Hyperuricemia  - no h/o gout or kidney stones  - uric acid 11.6 --> 11.1; remains uncontrolled. Unclear if dexilant is contributing   - didn't tolerate allopurinol due to rash  - trying Uloric today. No documented h/o heart disease but will monitor closely    Lymphedema of both lower extremities  - improved today  - continue lasix 20mg BID  - low salt diet     Essential hypertension  - controlled; continue RAAS blockade      Renal cyst  - each kidney with 2 simple kidney cysts  - no need for follow-up per current guidelines      Visit today included increased complexity associated with the care of the  episodic problem hyperuricemia addressed and managing the longitudinal care of the patient due to the serious and/or complex managed problem(s) CKD.      Treatment options and plan were discussed with the patient and/or caregiver.   RTC 2 months.     Alison Gomez MD  [facility]  [phone]

## 2024-10-04 NOTE — ASSESSMENT & PLAN NOTE
- baseline Cr 1.6-2.0; clinically due to longstanding HTN/DM  - Cr 1.4 in 4/2024 (and associated with uric acid of 8)        - Cr christal to 1.6-2.0 from 8/2023-1/2024; suspecting this was related to volume status  - Cr 1.4 at last visit but up to 2.1 today; c/w DAVINA. Unclear etiology but suspecting this is either due to worsening edema (CRS) or hyperuricemia  - increasing lasix to 40mg BID x 7 days; then go back to 20mg BID  - also starting allopurinol (see below)  - close follow-up

## 2024-10-04 NOTE — PATIENT INSTRUCTIONS
Your kidney function seems to correlate with your uric acid levels.   Let's try a medication called febuxostat (Uloric) to bring these levels down and hopefully help the kidney function improve.     This medication is contraindicated if you have heart disease; so don't it if this is present.   Call with any side effects, especially rash.

## 2024-10-04 NOTE — TELEPHONE ENCOUNTER
----- Message from Di Perez MD sent at 11/15/2018  3:20 PM CST -----  Please contact the patient.  She will need to hold her irebsartan for the next week.  Her potassium level is high and this may be the cause.  She will need to repeat blood work in one week.   Called patient and advised, she did mention she is on day 6 of this sore throat she cannot swallow due to pain, so she hasn't eaten much. She had to go back to work today. Is there anyway she can try another antibiotic?

## 2024-11-21 ENCOUNTER — PATIENT OUTREACH (OUTPATIENT)
Dept: ADMINISTRATIVE | Facility: HOSPITAL | Age: 84
End: 2024-11-21
Payer: MEDICARE

## 2024-11-21 ENCOUNTER — OFFICE VISIT (OUTPATIENT)
Dept: PODIATRY | Facility: CLINIC | Age: 84
End: 2024-11-21
Payer: MEDICARE

## 2024-11-21 VITALS — WEIGHT: 179.88 LBS | BODY MASS INDEX: 29.97 KG/M2 | HEIGHT: 65 IN

## 2024-11-21 DIAGNOSIS — B35.1 ONYCHOMYCOSIS: ICD-10-CM

## 2024-11-21 DIAGNOSIS — I89.0 LYMPHEDEMA OF BOTH LOWER EXTREMITIES: Primary | ICD-10-CM

## 2024-11-21 PROCEDURE — 99999 PR PBB SHADOW E&M-EST. PATIENT-LVL III: CPT | Mod: PBBFAC,,, | Performed by: PODIATRIST

## 2024-11-21 NOTE — PROGRESS NOTES
Subjective:     Patient ID: Char Savage is a 84 y.o. female.    Chief Complaint: Nail Care (8/21/24 Dr Vickers)    Char is a 84 y.o. female who presents to the clinic for evaluation and treatment of high risk feet. Char has a past medical history of Benign hypertension with chronic kidney disease, stage III (4/14/2015), Hyperlipidemia, Hypertension, Left eye injury (12/98), Nevus of choroid, Osteopenia (3/10/2022), Type 2 diabetes mellitus with stage 3 chronic kidney disease, without long-term current use of insulin (10/17/2017), Type 2 diabetes mellitus without complication (10/17/2017), and Venous stasis dermatitis of both lower extremities (12/11/2019). The patient's chief complaint is long, thick toenails. This patient has documented high risk feet requiring routine maintenance secondary to diabetes mellitis and those secondary complications of diabetes, as mentioned..    PCP: Joselito Vickers MD    Date Last Seen by PCP: per above        Hemoglobin A1C   Date Value Ref Range Status   04/24/2024 6.5 (H) 4.0 - 5.6 % Final     Comment:     ADA Screening Guidelines:  5.7-6.4%  Consistent with prediabetes  >or=6.5%  Consistent with diabetes    High levels of fetal hemoglobin interfere with the HbA1C  assay. Heterozygous hemoglobin variants (HbS, HgC, etc)do  not significantly interfere with this assay.   However, presence of multiple variants may affect accuracy.     08/02/2023 6.5 (H) 4.0 - 5.6 % Final     Comment:     ADA Screening Guidelines:  5.7-6.4%  Consistent with prediabetes  >or=6.5%  Consistent with diabetes    High levels of fetal hemoglobin interfere with the HbA1C  assay. Heterozygous hemoglobin variants (HbS, HgC, etc)do  not significantly interfere with this assay.   However, presence of multiple variants may affect accuracy.     03/10/2022 6.5 (H) 4.0 - 5.6 % Final     Comment:     ADA Screening Guidelines:  5.7-6.4%  Consistent with prediabetes  >or=6.5%  Consistent with  diabetes    High levels of fetal hemoglobin interfere with the HbA1C  assay. Heterozygous hemoglobin variants (HbS, HgC, etc)do  not significantly interfere with this assay.   However, presence of multiple variants may affect accuracy.         Review of Systems   Constitutional: Negative for chills.   Cardiovascular:  Negative for chest pain and claudication.   Respiratory:  Negative for cough.    Skin:  Positive for color change, dry skin and nail changes.   Musculoskeletal:  Positive for joint pain.   Gastrointestinal:  Negative for nausea.   Neurological:  Positive for paresthesias. Negative for numbness.   Psychiatric/Behavioral:  The patient is not nervous/anxious.         Objective:     Physical Exam  Constitutional:       Appearance: She is well-developed.      Comments: Oriented to time, place, and person.   Cardiovascular:      Comments: DP and PT pulses are palpable bilaterally. 3 sec capillary refill time and toes and feet are warm to touch proximally .  There is  hair growth on the feet and toes b/l. Edema noted B/L     Musculoskeletal:      Comments: Equinus noted b/l ankles with < 10 deg DF noted. MMT 5/5 in DF/PF/Inv/Ev resistance with no reproduction of pain in any direction. Passive range of motion of ankle and pedal joints is painless b/l.     Feet:      Right foot:      Skin integrity: No callus or dry skin.      Left foot:      Skin integrity: No callus or dry skin.   Lymphadenopathy:      Comments: Negative lymphadenopathy bilateral popliteal fossa and tarsal tunnel.   Skin:     Comments: Toenails 1-5 bilaterally are elongated by 2-3 mm, thickened by 2-3 mm, discolored/yellowed, dystrophic, brittle with subungual debris.     Neurological:      Mental Status: She is alert.      Comments: Light touch, proprioception, and sharp/dull sensation are all intact bilaterally. Protective threshold with the [place]-Wienstein monofilament is intact bilaterally.    Psychiatric:         Behavior: Behavior is  cooperative.           Assessment:      Encounter Diagnoses   Name Primary?    Lymphedema of both lower extremities Yes    Onychomycosis          Plan:     Char was seen today for nail care.    Diagnoses and all orders for this visit:    Lymphedema of both lower extremities    Onychomycosis          I counseled the patient on her conditions, their implications and medical management.    - Shoe inspection. Diabetic Foot Education. Patient reminded of the importance of good nutrition and blood sugar control to help prevent podiatric complications of diabetes. Patient instructed on proper foot hygeine. We discussed wearing proper shoe gear, daily foot inspections, never walking without protective shoe gear, never putting sharp instruments to feet, routine podiatric nail visits every 2-3 months.   - With patient's permission, nails were aggressively reduced and debrided x 10 to their soft tissue attachment mechanically and with electric , removing all offending nail and debris. Patient relates relief following the procedure. He will continue to monitor the areas daily, inspect his feet, wear protective shoe gear when ambulatory, moisturizer to maintain skin integrity and follow in this office in approximately 2-3 months, sooner p.r.n.

## 2024-11-22 ENCOUNTER — LAB VISIT (OUTPATIENT)
Dept: LAB | Facility: HOSPITAL | Age: 84
End: 2024-11-22
Attending: INTERNAL MEDICINE
Payer: MEDICARE

## 2024-11-22 DIAGNOSIS — I10 ESSENTIAL HYPERTENSION: ICD-10-CM

## 2024-11-22 DIAGNOSIS — E55.9 VITAMIN D DEFICIENCY DISEASE: ICD-10-CM

## 2024-11-22 DIAGNOSIS — E78.2 MIXED DIABETIC HYPERLIPIDEMIA ASSOCIATED WITH TYPE 2 DIABETES MELLITUS: ICD-10-CM

## 2024-11-22 DIAGNOSIS — E11.22 CONTROLLED TYPE 2 DIABETES MELLITUS WITH STAGE 4 CHRONIC KIDNEY DISEASE, UNSPECIFIED WHETHER LONG TERM INSULIN USE: ICD-10-CM

## 2024-11-22 DIAGNOSIS — N18.4 CONTROLLED TYPE 2 DIABETES MELLITUS WITH STAGE 4 CHRONIC KIDNEY DISEASE, UNSPECIFIED WHETHER LONG TERM INSULIN USE: ICD-10-CM

## 2024-11-22 DIAGNOSIS — E11.69 MIXED DIABETIC HYPERLIPIDEMIA ASSOCIATED WITH TYPE 2 DIABETES MELLITUS: ICD-10-CM

## 2024-11-22 LAB
25(OH)D3+25(OH)D2 SERPL-MCNC: 41 NG/ML (ref 30–96)
ALBUMIN SERPL BCP-MCNC: 3.8 G/DL (ref 3.5–5.2)
ALP SERPL-CCNC: 65 U/L (ref 40–150)
ALT SERPL W/O P-5'-P-CCNC: 12 U/L (ref 10–44)
ANION GAP SERPL CALC-SCNC: 12 MMOL/L (ref 8–16)
AST SERPL-CCNC: 23 U/L (ref 10–40)
BILIRUB SERPL-MCNC: 0.5 MG/DL (ref 0.1–1)
BUN SERPL-MCNC: 37 MG/DL (ref 8–23)
CALCIUM SERPL-MCNC: 10 MG/DL (ref 8.7–10.5)
CHLORIDE SERPL-SCNC: 102 MMOL/L (ref 95–110)
CHOLEST SERPL-MCNC: 149 MG/DL (ref 120–199)
CHOLEST/HDLC SERPL: 2.5 {RATIO} (ref 2–5)
CO2 SERPL-SCNC: 26 MMOL/L (ref 23–29)
CREAT SERPL-MCNC: 2.2 MG/DL (ref 0.5–1.4)
EST. GFR  (NO RACE VARIABLE): 21.3 ML/MIN/1.73 M^2
ESTIMATED AVG GLUCOSE: 128 MG/DL (ref 68–131)
GLUCOSE SERPL-MCNC: 112 MG/DL (ref 70–110)
HBA1C MFR BLD: 6.1 % (ref 4–5.6)
HDLC SERPL-MCNC: 60 MG/DL (ref 40–75)
HDLC SERPL: 40.3 % (ref 20–50)
LDLC SERPL CALC-MCNC: 70 MG/DL (ref 63–159)
NONHDLC SERPL-MCNC: 89 MG/DL
POTASSIUM SERPL-SCNC: 4.7 MMOL/L (ref 3.5–5.1)
PROT SERPL-MCNC: 7.9 G/DL (ref 6–8.4)
SODIUM SERPL-SCNC: 140 MMOL/L (ref 136–145)
TRIGL SERPL-MCNC: 95 MG/DL (ref 30–150)
TSH SERPL DL<=0.005 MIU/L-ACNC: 3.53 UIU/ML (ref 0.4–4)

## 2024-11-22 PROCEDURE — 80061 LIPID PANEL: CPT | Performed by: INTERNAL MEDICINE

## 2024-11-22 PROCEDURE — 84443 ASSAY THYROID STIM HORMONE: CPT | Performed by: INTERNAL MEDICINE

## 2024-11-22 PROCEDURE — 80053 COMPREHEN METABOLIC PANEL: CPT | Performed by: INTERNAL MEDICINE

## 2024-11-22 PROCEDURE — 83036 HEMOGLOBIN GLYCOSYLATED A1C: CPT | Performed by: INTERNAL MEDICINE

## 2024-11-22 PROCEDURE — 82306 VITAMIN D 25 HYDROXY: CPT | Performed by: INTERNAL MEDICINE

## 2024-11-29 ENCOUNTER — OFFICE VISIT (OUTPATIENT)
Dept: CARDIOLOGY | Facility: CLINIC | Age: 84
End: 2024-11-29
Payer: MEDICARE

## 2024-11-29 VITALS
BODY MASS INDEX: 29.49 KG/M2 | HEART RATE: 60 BPM | DIASTOLIC BLOOD PRESSURE: 66 MMHG | SYSTOLIC BLOOD PRESSURE: 138 MMHG | HEIGHT: 65 IN | WEIGHT: 177 LBS

## 2024-11-29 DIAGNOSIS — E11.69 MIXED DIABETIC HYPERLIPIDEMIA ASSOCIATED WITH TYPE 2 DIABETES MELLITUS: ICD-10-CM

## 2024-11-29 DIAGNOSIS — N18.30 BENIGN HYPERTENSION WITH CHRONIC KIDNEY DISEASE, STAGE III: ICD-10-CM

## 2024-11-29 DIAGNOSIS — I12.9 BENIGN HYPERTENSION WITH CHRONIC KIDNEY DISEASE, STAGE III: ICD-10-CM

## 2024-11-29 DIAGNOSIS — E78.2 MIXED HYPERLIPIDEMIA: ICD-10-CM

## 2024-11-29 DIAGNOSIS — E78.2 MIXED DIABETIC HYPERLIPIDEMIA ASSOCIATED WITH TYPE 2 DIABETES MELLITUS: ICD-10-CM

## 2024-11-29 DIAGNOSIS — N18.32 STAGE 3B CHRONIC KIDNEY DISEASE: ICD-10-CM

## 2024-11-29 DIAGNOSIS — I70.0 AORTIC ATHEROSCLEROSIS: ICD-10-CM

## 2024-11-29 DIAGNOSIS — I10 ESSENTIAL HYPERTENSION: ICD-10-CM

## 2024-11-29 DIAGNOSIS — I89.0 LYMPHEDEMA OF BOTH LOWER EXTREMITIES: ICD-10-CM

## 2024-11-29 DIAGNOSIS — M79.3 LIPODERMATOSCLEROSIS OF BOTH LOWER EXTREMITIES: ICD-10-CM

## 2024-11-29 DIAGNOSIS — R60.0 EDEMA OF BOTH LOWER EXTREMITIES: Primary | ICD-10-CM

## 2024-11-29 DIAGNOSIS — Z78.9 STATIN INTOLERANCE: ICD-10-CM

## 2024-11-29 DIAGNOSIS — I87.2 VENOUS STASIS DERMATITIS OF BOTH LOWER EXTREMITIES: ICD-10-CM

## 2024-11-29 PROCEDURE — 99999 PR PBB SHADOW E&M-EST. PATIENT-LVL III: CPT | Mod: PBBFAC,,, | Performed by: INTERNAL MEDICINE

## 2024-11-29 NOTE — PATIENT INSTRUCTIONS
Assessment/Plan:  Char Savage is a 84 y.o. female with BLE lymphedema, venous insufficiency, HTN, HLD, DM2, CKD stage 3, obesity, who presents for a follow up appointment.     Leg Swelling- Due to chronic venous insufficiency and BLE lymphedema.  Now significantly improved and stable. Continue lymphedema clinic techniques at home.  Continue lasix 20 mg bid.  Continue graduated compression hose.  Limit sodium intake to 2000 mg daily.  Limit volume intake to 1.5 L daily.  Elevate legs when resting.    2. HTN- Continue current medication.    3. HLD- Pt with documented statin intolerance. he is tolerating bempedoic acid-zetia 180-10 mg daily with no issues. LDL 70 on 11/22/2024. Continue bempedoic acid-zetia.       4. Obesity- Encourage diet, exercise, and weight loss.    Follow up in 6 months with lipids prior

## 2024-11-29 NOTE — PROGRESS NOTES
[facility]      Chief Complaint   Patient presents with    edema of lower extremities       Patient ID: Char Savage is a 84 y.o. female with BLE lymphedema, venous insufficiency, HTN, HLD, DM2, CKD stage 3, obesity, who presents for a follow up appointment.  Pertinent history/events are as follows:     -Pt kindly referred by Dr. Vickers for evaluation of leg swelling.     -At our initial clinic visit on 11/17/2023, Mrs. Savage reported leg swelling for several years.  She reports periods of ulcers/wounds in 2019.  No ulcers/wounds currently.  She has no claudication, no chest pain or SOB.  Currently taking lasix 40 mg bid.  BNP mildly elevated at 142.  BLE Venous Ultrasound on 11/6/2023 demonstrated no evidence of deep venous thrombosis in either lower extremity.   Plan:   Leg Swelling- Due to chronic venous insufficiency and BLE lymphedema.  Check JUANCARLOS study.  Refer to lymphedema clinic.  Decrease lasix to 20 mg bid.  Check cmp today and in 1 week.  Recommend wearing graduated compression hose.  Limit sodium intake to 2000 mg daily.  Limit volume intake to 1.5 L daily.  Elevate legs when resting.  HTN- Continue current medication.  HLD- Check lipids prior to next visit.  Plan to start meds next visit.  Pt with documented statin intolerance.   Obesity- Encourage diet, exercise, and weight loss.    -At follow up clinic visit on 5/10/2024, Mrs. Savage reports significant improvement in leg swelling.  She has completed lymphedema clinic therapy and continues to perform techniques at home.   Plan:  Leg Swelling- Due to chronic venous insufficiency and BLE lymphedema.  Now significantly improved and stable. Continue lymphedema clinic techniques at home.  Continue lasix 20 mg bid.  Continue graduated compression hose.  Limit sodium intake to 2000 mg daily.  Limit volume intake to 1.5 L daily.  Elevate legs when resting.  HTN- Continue current medication.  HLD-  on 5/2/2024. Pt with documented  statin intolerance. Start bempedoic acid-zetia.     Obesity- Encourage diet, exercise, and weight loss.    HPI:  Mrs. Savage reports continued improvement in leg swelling.  She is tolerating bempedoic acid-zetia 180-10 mg daily with no issues. LDL 70 on 11/22/2024.     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    Osteopenia 3/10/2022    Type 2 diabetes mellitus with stage 3 chronic kidney disease, without long-term current use of insulin 10/17/2017    Cannot tolerate metformin     Type 2 diabetes mellitus without complication 10/17/2017    Venous stasis dermatitis of both lower extremities 12/11/2019     Past Surgical History:   Procedure Laterality Date    APPENDECTOMY      BREAST BIOPSY Bilateral     CATARACT EXTRACTION  1993/2000    od/os    COLONOSCOPY      HYSTERECTOMY  07/1972    ORBITAL FRACTURE SURGERY  12/1998    os dr coleman    PARTIAL HYSTERECTOMY      RIB FRACTURE SURGERY      TONSILLECTOMY, ADENOIDECTOMY       Social History     Socioeconomic History    Marital status:    Tobacco Use    Smoking status: Never    Smokeless tobacco: Never   Substance and Sexual Activity    Alcohol use: No     Alcohol/week: 0.0 standard drinks of alcohol    Drug use: No     Social Drivers of Health     Financial Resource Strain: Low Risk  (8/8/2024)    Overall Financial Resource Strain (CARDIA)     Difficulty of Paying Living Expenses: Not very hard   Food Insecurity: No Food Insecurity (8/8/2024)    Hunger Vital Sign     Worried About Running Out of Food in the Last Year: Never true     Ran Out of Food in the Last Year: Never true   Physical Activity: Inactive (8/8/2024)    Exercise Vital Sign     Days of Exercise per Week: 0 days     Minutes of Exercise per Session: 0 min   Stress: No Stress  Concern Present (8/8/2024)    [employer] [place] [employer] - Occupational Stress Questionnaire     Feeling of Stress : Only a little   Housing Stability: Unknown (8/8/2024)    Housing Stability Vital Sign     Unable to Pay for Housing in the Last Year: No     Family History   Problem Relation Name Age of Onset    Cancer Mother      Breast cancer Mother      Cataracts Father      Hypertension Father      No Known Problems Sister      No Known Problems Brother      No Known Problems Maternal Aunt      No Known Problems Maternal Uncle      No Known Problems Paternal Aunt      No Known Problems Paternal Uncle      No Known Problems Maternal Grandmother      No Known Problems Maternal Grandfather      No Known Problems Paternal Grandmother      No Known Problems Paternal Grandfather      Amblyopia Neg Hx      Blindness Neg Hx      Diabetes Neg Hx      Glaucoma Neg Hx      Macular degeneration Neg Hx      Retinal detachment Neg Hx      Strabismus Neg Hx      Stroke Neg Hx      Thyroid disease Neg Hx         Review of patient's allergies indicates:   Allergen Reactions    Amoxicillin Other (See Comments)    Bactrim [sulfamethoxazole-trimethoprim] Other (See Comments)    Darvon [propoxyphene] Other (See Comments)    Statins-hmg-coa reductase inhibitors      Flu symptoms    Toradol [ketorolac] Other (See Comments)    Allopurinol Rash    Metformin      Dehydration    Vibramycin [doxycycline calcium] Rash       Medication List with Changes/Refills   Current Medications    ASPIRIN (ECOTRIN) 81 MG EC TABLET    Take 81 mg by mouth once daily.    BEMPEDOIC ACID-EZETIMIBE 180-10 MG TAB    Take 1 tablet by mouth daily.    FEBUXOSTAT (ULORIC) 40 MG TAB    Take 1 tablet (40 mg total) by mouth once daily.    FUROSEMIDE (LASIX) 20 MG TABLET    TAKE 1 TABLET BY MOUTH TWICE A DAY AS NEEDED FOR LEG SWELLING    LISINOPRIL (PRINIVIL,ZESTRIL) 20 MG TABLET    Take 1 tablet (20 mg total) by mouth once daily.    METOPROLOL TARTRATE  "(LOPRESSOR) 100 MG TABLET    Take 1 tablet (100 mg total) by mouth 2 (two) times daily.    MULTIVIT-MINERALS/FOLIC ACID (CENTRUM ADULT 50 PLUS ORAL)    Take by mouth once daily.    OMEPRAZOLE (PRILOSEC) 20 MG CAPSULE    Take 1 capsule (20 mg total) by mouth once daily.    TRIAMCINOLONE ACETONIDE 0.1% (KENALOG) 0.1 % CREAM    Apply topically 2 (two) times daily.       Review of Systems  Constitution: Denies chills, fever, and sweats.  HENT: Denies headaches or blurry vision.  Cardiovascular: Denies chest pain or irregular heart beat.  Respiratory: Denies cough or shortness of breath.  Gastrointestinal: Denies abdominal pain, nausea, or vomiting.  Musculoskeletal: Denies muscle cramps.  Neurological: Denies dizziness or focal weakness.  Psychiatric/Behavioral: Normal mental status.  Hematologic/Lymphatic: Denies bleeding problem or easy bruising/bleeding.  Skin: Denies rash or suspicious lesions    Physical Examination  /66   Pulse 60   Ht 5' 5" (1.651 m)   Wt 80.3 kg (177 lb 0.5 oz)   BMI 29.46 kg/m²     Constitutional: No acute distress, conversant  HEENT: Sclera anicteric, Pupils equal, round and reactive to light, extraocular motions intact, Oropharynx clear  Neck: No JVD, no carotid bruits  Cardiovascular: regular rate and rhythm, no murmur, rubs or gallops, normal S1/S2  Pulmonary: Clear to auscultation bilaterally  Abdominal: Abdomen soft, nontender, nondistended, positive bowel sounds  Extremities: No lower extremity edema,   Pulses:  Carotid pulses are 2+ on the right side, and 2+ on the left side.  Radial pulses are 2+ on the right side, and 2+ on the left side.   Femoral pulses are 2+ on the right side, and 2+ on the left side.  Popliteal pulses are 2+ on the right side, and 2+ on the left side.   Dorsalis pedis pulses are 2+ on the right side, and 2+ on the left side.   Posterior tibial pulses are 2+ on the right side, and 2+ on the left side.    Skin: No ecchymosis, erythema, or ulcers  Psych: " "Alert and oriented x 3, appropriate affect  Neuro: CNII-XII intact, no focal deficits    Labs:  Most Recent Data  CBC:   Lab Results   Component Value Date    WBC 8.25 07/29/2024    HGB 12.5 07/29/2024    HCT 39.4 07/29/2024     07/29/2024    MCV 89 07/29/2024    RDW 14.3 07/29/2024     BMP:   Lab Results   Component Value Date     11/22/2024    K 4.7 11/22/2024     11/22/2024    CO2 26 11/22/2024    BUN 37 (H) 11/22/2024    CREATININE 2.2 (H) 11/22/2024     (H) 11/22/2024    CALCIUM 10.0 11/22/2024    PHOS 3.7 09/26/2024     LFTS;   Lab Results   Component Value Date    PROT 7.9 11/22/2024    ALBUMIN 3.8 11/22/2024    BILITOT 0.5 11/22/2024    AST 23 11/22/2024    ALKPHOS 65 11/22/2024    ALT 12 11/22/2024     COAGS: No results found for: "INR", "PROTIME", "PTT"  FLP:   Lab Results   Component Value Date    CHOL 149 11/22/2024    HDL 60 11/22/2024    LDLCALC 70.0 11/22/2024    TRIG 95 11/22/2024    CHOLHDL 40.3 11/22/2024     CARDIAC:   Lab Results   Component Value Date     (H) 08/02/2023       Imaging:    BLE Venous Ultrasound 12/1/2023:    There is no evidence of a left lower extremity DVT.    The right superficial femoral middle vein is normal.    The left superficial femoral middle vein is normal.    JUANCARLOS Study 11/29/2023:    Normal resting ABIs.    Normal waveforms.    BLE Venous Ultrasound 11/6/2023:  No evidence of deep venous thrombosis in either lower extremity.     Assessment/Plan:  Char Savage is a 84 y.o. female with BLE lymphedema, venous insufficiency, HTN, HLD, DM2, CKD stage 3, obesity, who presents for a follow up appointment.     Leg Swelling- Due to chronic venous insufficiency and BLE lymphedema.  Now significantly improved and stable. Continue lymphedema clinic techniques at home.  Continue lasix 20 mg bid.  Continue graduated compression hose.  Limit sodium intake to 2000 mg daily.  Limit volume intake to 1.5 L daily.  Elevate legs when resting.    2. HTN- " Continue current medication.    3. HLD- Pt with documented statin intolerance. he is tolerating bempedoic acid-zetia 180-10 mg daily with no issues. LDL 70 on 11/22/2024. Continue bempedoic acid-zetia.       4. Obesity- Encourage diet, exercise, and weight loss.    Follow up in 6 months with lipids prior    Total duration of face to face visit time 20 minutes.  Total time spent counseling greater than fifty percent of total visit time.  Counseling included discussion regarding imaging findings, diagnosis, possibilities, treatment options, risks and benefits.  The patient had many questions regarding the options and long-term effects.    Handy Andres MD, PhD  Interventional Cardiology

## 2024-11-29 NOTE — TELEPHONE ENCOUNTER
Patient called back and I informed her that she is already taking the maxed dosage of her medication so Dr Perez is unable to increase the dosage. I asked if patient has been wearing her compression stockings daily and she stated that she has not been wearing them everyday. I informed her that per Dr. Perez, if they are not worn daily the edema will get worse. Patient stated that she will start wearing her compression stockings daily. Patient verbalized understanding.    Pulmonary Progress Note      NAME: Virginia Mark - ROOM: [address] [place] [address]A - MRN: [id number] - Age: 50 year old - : 1974    Assessment/Plan:  Hypoxia - improving  -main intervention has been steroids.  Not on Abx  -on RA at rest and hopefully can wean off soon  Multifocal pneumonia - symptom onset 2 month ago. Outpatient CT scan done 10/15/24 shows bilateral peribronchial consolidations and lung nodules. Subsequent CXR's have worsened.   -ID consult - holding on abx (treated as outpatient)  -s/p bronch BAL cultures in process but so far negative  -lab testing to r/o autoimmune disease and fungal disease (serologies, ANCA, HIV all negative, autoimmune panel pending)  -started on empiric steroids and feels better  -continue IV steroids today.  Home with prolonged po steroid taper starting at 60 mg (reduce q5days)    Discussed with patient and .  Hopeful discharge soon and then follow up with me in 1-2 weeks      Jeremías Iverson M.D.  Pulmonary and Critical Care Medicine  [facility]      Subjective:  No acute events overnight.     Medications:   methylPREDNISolone  60 mg Intravenous 4 times per day    escitalopram  15 mg Oral Daily    enoxaparin  40 mg Subcutaneous Daily    insulin aspart  1-5 Units Subcutaneous TID CC and HS     Intake/Output Summary (Last 24 hours) at 2024 1233  Last data filed at 2024 1000  Gross per 24 hour   Intake 540 ml   Output 1500 ml   Net -960 ml       Physical Exam:  /80 (BP Location: Right arm)   Pulse 78   Temp 97.9 °F (36.6 °C) (Oral)   Resp 16   Wt 160 lb 4.4 oz (72.7 kg)   SpO2 94%   BMI 26.67 kg/m²   Physical Exam:   General: alert, cooperative, no respiratory distress.   HEENT: Normocephalic atraumatic.Lips, mucosa, and tongue normal.  No thrush noted.   Neck: supple without mass   Lungs: clear   Chest wall: No tenderness or deformity.   Heart: Regular rate and rhythm, normal S1S2, no murmur.   Abdomen: soft, non-tender, non-distended, positive  BS.   Extremity: no edema   Skin: no new rash   Neuro: alert    Recent Labs   Lab 11/28/24  0426 11/29/24  0524   RBC 4.39 4.52   HGB 13.6 14.0   HCT 41.0 40.7   MCV 93.4 90.0   MCH 31.0 31.0   MCHC 33.2 34.4   RDW 12.0 11.9   NEPRELIM 3.01  --    WBC 4.9 10.4   .0 315.0     Recent Labs   Lab 11/26/24  1433 11/27/24  0436 11/28/24 0426 11/29/24 0524   GLU 99 104* 105* 148*   BUN 11 12 16 17   CREATSERUM 0.82 0.79 0.89 0.82   CA 9.9 9.7 9.6 10.7*   ALB 4.4 4.2  --   --     141 139 139   K 4.5 4.5 4.4 4.3    106 106 106   CO2 24.0 30.0 28.0 25.0   ALKPHO 91 82  --   --    AST 27 22  --   --    ALT 15 15  --   --    BILT 0.8 1.0  --   --    TP 6.7 6.2  --   --        Imaging: I independently visualized all relevant chest imaging in PACS, agree with radiology interpretation except where noted.

## 2024-12-03 ENCOUNTER — HOSPITAL ENCOUNTER (OUTPATIENT)
Dept: RADIOLOGY | Facility: HOSPITAL | Age: 84
Discharge: HOME | End: 2024-12-03
Attending: INTERNAL MEDICINE
Payer: MEDICARE

## 2024-12-03 DIAGNOSIS — Z12.31 ENCOUNTER FOR SCREENING MAMMOGRAM FOR BREAST CANCER: ICD-10-CM

## 2024-12-03 PROCEDURE — 77067 SCR MAMMO BI INCL CAD: CPT | Mod: TC,PO

## 2024-12-06 ENCOUNTER — LAB VISIT (OUTPATIENT)
Dept: LAB | Facility: HOSPITAL | Age: 84
End: 2024-12-06
Attending: INTERNAL MEDICINE
Payer: MEDICARE

## 2024-12-06 DIAGNOSIS — I89.0 LYMPHEDEMA OF BOTH LOWER EXTREMITIES: ICD-10-CM

## 2024-12-06 DIAGNOSIS — E79.0 HYPERURICEMIA: ICD-10-CM

## 2024-12-06 DIAGNOSIS — N18.32 STAGE 3B CHRONIC KIDNEY DISEASE: ICD-10-CM

## 2024-12-06 LAB
ALBUMIN SERPL BCP-MCNC: 3.7 G/DL (ref 3.5–5.2)
ANION GAP SERPL CALC-SCNC: 10 MMOL/L (ref 8–16)
BASOPHILS # BLD AUTO: 0.08 K/UL (ref 0–0.2)
BASOPHILS NFR BLD: 1 % (ref 0–1.9)
BUN SERPL-MCNC: 33 MG/DL (ref 8–23)
CALCIUM SERPL-MCNC: 10.4 MG/DL (ref 8.7–10.5)
CHLORIDE SERPL-SCNC: 104 MMOL/L (ref 95–110)
CO2 SERPL-SCNC: 27 MMOL/L (ref 23–29)
CREAT SERPL-MCNC: 2.1 MG/DL (ref 0.5–1.4)
DIFFERENTIAL METHOD BLD: ABNORMAL
EOSINOPHIL # BLD AUTO: 0.2 K/UL (ref 0–0.5)
EOSINOPHIL NFR BLD: 2.1 % (ref 0–8)
ERYTHROCYTE [DISTWIDTH] IN BLOOD BY AUTOMATED COUNT: 13.6 % (ref 11.5–14.5)
EST. GFR  (NO RACE VARIABLE): 22.5 ML/MIN/1.73 M^2
GLUCOSE SERPL-MCNC: 85 MG/DL (ref 70–110)
HCT VFR BLD AUTO: 41.3 % (ref 37–48.5)
HGB BLD-MCNC: 12.5 G/DL (ref 12–16)
IMM GRANULOCYTES # BLD AUTO: 0.03 K/UL (ref 0–0.04)
IMM GRANULOCYTES NFR BLD AUTO: 0.4 % (ref 0–0.5)
LYMPHOCYTES # BLD AUTO: 2.8 K/UL (ref 1–4.8)
LYMPHOCYTES NFR BLD: 34.8 % (ref 18–48)
MCH RBC QN AUTO: 28.7 PG (ref 27–31)
MCHC RBC AUTO-ENTMCNC: 30.3 G/DL (ref 32–36)
MCV RBC AUTO: 95 FL (ref 82–98)
MONOCYTES # BLD AUTO: 0.9 K/UL (ref 0.3–1)
MONOCYTES NFR BLD: 11 % (ref 4–15)
NEUTROPHILS # BLD AUTO: 4.1 K/UL (ref 1.8–7.7)
NEUTROPHILS NFR BLD: 50.7 % (ref 38–73)
NRBC BLD-RTO: 0 /100 WBC
PHOSPHATE SERPL-MCNC: 3.6 MG/DL (ref 2.7–4.5)
PLATELET # BLD AUTO: 324 K/UL (ref 150–450)
PMV BLD AUTO: 10.9 FL (ref 9.2–12.9)
POTASSIUM SERPL-SCNC: 4.9 MMOL/L (ref 3.5–5.1)
PTH-INTACT SERPL-MCNC: 33.2 PG/ML (ref 9–77)
RBC # BLD AUTO: 4.36 M/UL (ref 4–5.4)
SODIUM SERPL-SCNC: 141 MMOL/L (ref 136–145)
URATE SERPL-MCNC: 5.3 MG/DL (ref 2.4–5.7)
WBC # BLD AUTO: 8.07 K/UL (ref 3.9–12.7)

## 2024-12-06 PROCEDURE — 83970 ASSAY OF PARATHORMONE: CPT | Performed by: INTERNAL MEDICINE

## 2024-12-06 PROCEDURE — 84550 ASSAY OF BLOOD/URIC ACID: CPT | Performed by: INTERNAL MEDICINE

## 2024-12-06 PROCEDURE — 80069 RENAL FUNCTION PANEL: CPT | Performed by: INTERNAL MEDICINE

## 2024-12-06 PROCEDURE — 36415 COLL VENOUS BLD VENIPUNCTURE: CPT | Mod: PO | Performed by: INTERNAL MEDICINE

## 2024-12-06 PROCEDURE — 85025 COMPLETE CBC W/AUTO DIFF WBC: CPT | Performed by: INTERNAL MEDICINE

## 2024-12-19 ENCOUNTER — OFFICE VISIT (OUTPATIENT)
Dept: NEPHROLOGY | Facility: CLINIC | Age: 84
End: 2024-12-19
Payer: MEDICARE

## 2024-12-19 VITALS
RESPIRATION RATE: 18 BRPM | DIASTOLIC BLOOD PRESSURE: 60 MMHG | WEIGHT: 175.69 LBS | HEIGHT: 65 IN | HEART RATE: 62 BPM | BODY MASS INDEX: 29.27 KG/M2 | SYSTOLIC BLOOD PRESSURE: 128 MMHG | OXYGEN SATURATION: 97 %

## 2024-12-19 DIAGNOSIS — E79.0 HYPERURICEMIA: ICD-10-CM

## 2024-12-19 DIAGNOSIS — I10 ESSENTIAL HYPERTENSION: ICD-10-CM

## 2024-12-19 DIAGNOSIS — N18.4 CHRONIC KIDNEY DISEASE (CKD), STAGE IV (SEVERE): Primary | ICD-10-CM

## 2024-12-19 DIAGNOSIS — I89.0 LYMPHEDEMA OF BOTH LOWER EXTREMITIES: ICD-10-CM

## 2024-12-19 PROCEDURE — G2211 COMPLEX E/M VISIT ADD ON: HCPCS | Mod: S$GLB,,, | Performed by: INTERNAL MEDICINE

## 2024-12-19 PROCEDURE — 1160F RVW MEDS BY RX/DR IN RCRD: CPT | Mod: CPTII,S$GLB,, | Performed by: INTERNAL MEDICINE

## 2024-12-19 PROCEDURE — 3288F FALL RISK ASSESSMENT DOCD: CPT | Mod: CPTII,S$GLB,, | Performed by: INTERNAL MEDICINE

## 2024-12-19 PROCEDURE — 99999 PR PBB SHADOW E&M-EST. PATIENT-LVL IV: CPT | Mod: PBBFAC,,, | Performed by: INTERNAL MEDICINE

## 2024-12-19 PROCEDURE — 99214 OFFICE O/P EST MOD 30 MIN: CPT | Mod: S$GLB,,, | Performed by: INTERNAL MEDICINE

## 2024-12-19 PROCEDURE — 1159F MED LIST DOCD IN RCRD: CPT | Mod: CPTII,S$GLB,, | Performed by: INTERNAL MEDICINE

## 2024-12-19 PROCEDURE — 1125F AMNT PAIN NOTED PAIN PRSNT: CPT | Mod: CPTII,S$GLB,, | Performed by: INTERNAL MEDICINE

## 2024-12-19 PROCEDURE — 1101F PT FALLS ASSESS-DOCD LE1/YR: CPT | Mod: CPTII,S$GLB,, | Performed by: INTERNAL MEDICINE

## 2024-12-19 NOTE — ASSESSMENT & PLAN NOTE
- no h/o gout or kidney stones  - didn't tolerate allopurinol due to rash  - uric acid 11.1 --> 5.3; significantly improved on Uloric  - I was hoping that improvement in hyperuricemia would result in improvement in Cr; this has not happened yet. Will continue to trend  - continue uloric 40mg daily

## 2024-12-19 NOTE — ASSESSMENT & PLAN NOTE
- baseline Cr 1.8-2.1 since 11/2023; due to longstanding HTN/DM  - Cr 2.1 today; stable and at baseline  - UPCR 0.17; negative. Continue lisinopril.  - K 4.9; borderline high. Advised caution with high K foods (mandarins, etc)  - continue lasix 20mg BID  - good water intake; low salt diet; avoid NSAIDs

## 2024-12-19 NOTE — PROGRESS NOTES
[facility]  [address] [facility] [address], [address]  [place]  [phone]    PCP: Joselito Vickers MD  Cardiologist: Dmitry       HPI: Mrs. Char Savage is a 84 y.o. female with HTN, T2DM, and lymphedema who is here for established patient evaluation of Chronic Kidney Disease  Initial visit 4/30/24. Her baseline Cr has been 1.2-1.5 since 2009; though christal to 1.6-2.0 from 8/2023-1/2024. Her Cr is 1.4 today. No proteinuria.  She reports her T2DM has been well-controlled. Average A1c ~6.5% since 2016. She is not currently on diabetic medication. She is on lisinopril for HTN.  She denies h/o gout, kidney stones, or NSAID use. No family h/o kidney disease.   Her main concern is BLE edema which began ~2019. She reports undergoing extensive workup which was unremarkable. She was ultimately diagnosed with lymphedema. She wears compression socks and uses a lymphedema pump. She notes this has significantly improved her symptoms and has also improved her balance and ability to get around. She reports previously having weeping edema up to her knees; she no longer has drainage and edema only goes up to her shins.   She takes lasix 20mg BID. She notes augmented UOP. She was briefly on lasix 40mg BID; she noted increased UOP but no change in lymphedema on this regimen. She tries to follow a low salt diet.  She is hoping to get her ankle edema further improved.     4/30/24: reinforced low salt diet.     8/13/24: tense edema; increased lasix from 20mg BID to 40mg BID x 7 days, then resumed 20mg BID. Started allopurinol for uric acid > 11.         Interval Hx:   LV 10/4/24: stopped allopurinol due to rash; started Uloric for hyperuricemia.  Today she reports to be doing okay. She seems to have some confusion about her medications. She reports the pharmacist told her she had a new medication called in for gout, but she doesn't have gout. She reports compliance with Uloric; I don't see any other gout medications on her MAR. We  "discussed that high uric acid can cause kidney and heart disease, so this medication will prevent that.   +edema; stable on lasix and compression stockings.   Enjoys mandarins.   Also taking calcium and vitamin D.       ROS:  Complete ROS otherwise negative except as indicated above.         Current Outpatient Medications:     aspirin (ECOTRIN) 81 MG EC tablet, Take 81 mg by mouth once daily., Disp: , Rfl:     bempedoic acid-ezetimibe 180-10 mg Tab, Take 1 tablet by mouth daily., Disp: 30 tablet, Rfl: 11    febuxostat (ULORIC) 40 mg Tab, Take 1 tablet (40 mg total) by mouth once daily., Disp: 30 tablet, Rfl: 11    furosemide (LASIX) 20 MG tablet, TAKE 1 TABLET BY MOUTH TWICE A DAY AS NEEDED FOR LEG SWELLING, Disp: 180 tablet, Rfl: 3    lisinopriL (PRINIVIL,ZESTRIL) 20 MG tablet, Take 1 tablet (20 mg total) by mouth once daily., Disp: 90 tablet, Rfl: 1    metoprolol tartrate (LOPRESSOR) 100 MG tablet, Take 1 tablet (100 mg total) by mouth 2 (two) times daily., Disp: 180 tablet, Rfl: 3    multivit-minerals/folic acid (CENTRUM ADULT 50 PLUS ORAL), Take by mouth once daily., Disp: , Rfl:     omeprazole (PRILOSEC) 20 MG capsule, Take 1 capsule (20 mg total) by mouth once daily., Disp: 90 capsule, Rfl: 3    triamcinolone acetonide 0.1% (KENALOG) 0.1 % cream, Apply topically 2 (two) times daily., Disp: 2000 g, Rfl: 2      Vitals: Blood pressure 128/60, pulse 62, resp. rate 18, height 5' 5" (1.651 m), weight 79.7 kg (175 lb 11.3 oz), SpO2 97%. Body mass index is 29.24 kg/m².    Physical Exam  Vitals reviewed.   Constitutional:       General: She is awake. She is not in acute distress.     Appearance: Normal appearance. She is well-developed.   HENT:      Head: Normocephalic and atraumatic.      Nose: Nose normal.      Mouth/Throat:      Mouth: Mucous membranes are moist.   Eyes:      Extraocular Movements: Extraocular movements intact.      Conjunctiva/sclera: Conjunctivae normal.   Pulmonary:      Effort: Pulmonary effort " is normal.   Musculoskeletal:         General: No tenderness or signs of injury.      Right lower leg: Edema present.      Left lower leg: Edema present.      Comments: BLE with shiny edema proximal to sock line and up to low shin   Skin:     General: Skin is warm and dry.      Findings: No erythema or rash.   Neurological:      General: No focal deficit present.      Mental Status: She is alert. Mental status is at baseline.   Psychiatric:         Mood and Affect: Mood normal.         Behavior: Behavior normal.           Labs/Imaging:  Sodium   Date Value Ref Range Status   12/06/2024 141 136 - 145 mmol/L Final   11/22/2024 140 136 - 145 mmol/L Final   09/26/2024 140 136 - 145 mmol/L Final     Potassium   Date Value Ref Range Status   12/06/2024 4.9 3.5 - 5.1 mmol/L Final   11/22/2024 4.7 3.5 - 5.1 mmol/L Final   09/26/2024 4.6 3.5 - 5.1 mmol/L Final     Chloride   Date Value Ref Range Status   12/06/2024 104 95 - 110 mmol/L Final   11/22/2024 102 95 - 110 mmol/L Final   09/26/2024 104 95 - 110 mmol/L Final     CO2   Date Value Ref Range Status   12/06/2024 27 23 - 29 mmol/L Final   11/22/2024 26 23 - 29 mmol/L Final   09/26/2024 24 23 - 29 mmol/L Final     BUN   Date Value Ref Range Status   12/06/2024 33 (H) 8 - 23 mg/dL Final   11/22/2024 37 (H) 8 - 23 mg/dL Final   09/26/2024 32 (H) 8 - 23 mg/dL Final     Creatinine   Date Value Ref Range Status   12/06/2024 2.1 (H) 0.5 - 1.4 mg/dL Final   11/22/2024 2.2 (H) 0.5 - 1.4 mg/dL Final   09/26/2024 2.2 (H) 0.5 - 1.4 mg/dL Final     eGFR   Date Value Ref Range Status   12/06/2024 22.5 (A) >60 mL/min/1.73 m^2 Final   11/22/2024 21.3 (A) >60 mL/min/1.73 m^2 Final   09/26/2024 21 (A) >60 mL/min/1.73 m^2 Final     Calcium   Date Value Ref Range Status   12/06/2024 10.4 8.7 - 10.5 mg/dL Final   11/22/2024 10.0 8.7 - 10.5 mg/dL Final   09/26/2024 9.8 8.7 - 10.5 mg/dL Final     Phosphorus   Date Value Ref Range Status   12/06/2024 3.6 2.7 - 4.5 mg/dL Final   09/26/2024 3.7  2.7 - 4.5 mg/dL Final   07/29/2024 3.8 2.7 - 4.5 mg/dL Final     Albumin   Date Value Ref Range Status   12/06/2024 3.7 3.5 - 5.2 g/dL Final   11/22/2024 3.8 3.5 - 5.2 g/dL Final   09/26/2024 3.6 3.5 - 5.2 g/dL Final       PTH, Intact   Date Value Ref Range Status   12/06/2024 33.2 9.0 - 77.0 pg/mL Final   07/29/2024 54.2 9.0 - 77.0 pg/mL Final   04/24/2024 42.4 9.0 - 77.0 pg/mL Final     Vit D, 25-Hydroxy   Date Value Ref Range Status   11/22/2024 41 30 - 96 ng/mL Final     Comment:     Vitamin D deficiency.........<10 ng/mL                              Vitamin D insufficiency......10-29 ng/mL       Vitamin D sufficiency........> or equal to 30 ng/mL  Vitamin D toxicity............>100 ng/mL       Uric Acid   Date Value Ref Range Status   12/06/2024 5.3 2.4 - 5.7 mg/dL Final   09/26/2024 11.1 (H) 2.4 - 5.7 mg/dL Final   07/29/2024 11.6 (H) 2.4 - 5.7 mg/dL Final       Hemoglobin   Date Value Ref Range Status   12/06/2024 12.5 12.0 - 16.0 g/dL Final   07/29/2024 12.5 12.0 - 16.0 g/dL Final   04/24/2024 13.5 12.0 - 16.0 g/dL Final       Prot/Creat Ratio, Urine   Date Value Ref Range Status   12/06/2024 0.17 0.00 - 0.20 Final   07/29/2024 0.22 (H) 0.00 - 0.20 Final   04/24/2024 Unable to calculate 0.00 - 0.20 Final         Renal US: 7/29/24:   The right kidney measures 10.8 cm, RI 0.77.  Left kidney measures 9.9 cm, RI 0.78.  Kidneys demonstrate no hydronephrosis.  There are 2 cysts of the right kidney the largest upper pole measuring 1.9 cm.  There are 2 small cyst lower pole left kidney the largest measuring 1.2 cm.        Impression/Plan:    Chronic kidney disease (CKD), stage IV (severe)  - baseline Cr 1.8-2.1 since 11/2023; due to longstanding HTN/DM  - Cr 2.1 today; stable and at baseline  - UPCR 0.17; negative. Continue lisinopril.  - K 4.9; borderline high. Advised caution with high K foods (mandarins, etc)  - continue lasix 20mg BID  - good water intake; low salt diet; avoid NSAIDs    Hyperuricemia  - no h/o  gout or kidney stones  - didn't tolerate allopurinol due to rash  - uric acid 11.1 --> 5.3; significantly improved on Uloric  - I was hoping that improvement in hyperuricemia would result in improvement in Cr; this has not happened yet. Will continue to trend  - continue uloric 40mg daily    Lymphedema of both lower extremities  - stable  - continue lasix 20mg BID  - low salt diet    Essential hypertension  - controlled; continue RAAS blockade     Renal cyst  - each kidney with 2 simple kidney cysts  - no need for follow-up per current guidelines       Visit today included increased complexity associated with the care of the episodic problem hyperuricemia addressed and managing the longitudinal care of the patient due to the serious and/or complex managed problem(s) CKD.      Treatment options and plan were discussed with the patient and/or caregiver.   RTC 3-4 months.       Alison Gomez MD  [facility]  [phone]

## 2025-01-03 NOTE — TELEPHONE ENCOUNTER
No care due was identified.  [facility] Embedded Care Due Messages. Reference number: [id number].   1/03/2025 1:24:04 PM CST

## 2025-01-04 RX ORDER — LISINOPRIL 20 MG/1
20 TABLET ORAL
Qty: 90 TABLET | Refills: 2 | Status: SHIPPED | OUTPATIENT
Start: 2025-01-04

## 2025-01-04 NOTE — TELEPHONE ENCOUNTER
Refill Decision Note   Char Janette  is requesting a refill authorization.  Brief Assessment and Rationale for Refill:  Approve     Medication Therapy Plan:       Medication Reconciliation Completed: No   Comments:     No Care Gaps recommended.     Note composed:5:22 PM 01/04/2025

## 2025-02-24 ENCOUNTER — OFFICE VISIT (OUTPATIENT)
Dept: PODIATRY | Facility: CLINIC | Age: 85
End: 2025-02-24
Payer: MEDICARE

## 2025-02-24 VITALS — WEIGHT: 175.69 LBS | BODY MASS INDEX: 29.27 KG/M2 | HEIGHT: 65 IN

## 2025-02-24 DIAGNOSIS — B35.1 ONYCHOMYCOSIS: ICD-10-CM

## 2025-02-24 DIAGNOSIS — I89.0 LYMPHEDEMA OF BOTH LOWER EXTREMITIES: Primary | ICD-10-CM

## 2025-02-24 PROCEDURE — 99999 PR PBB SHADOW E&M-EST. PATIENT-LVL III: CPT | Mod: PBBFAC,,, | Performed by: PODIATRIST

## 2025-02-24 PROCEDURE — 11721 DEBRIDE NAIL 6 OR MORE: CPT | Mod: Q9,S$GLB,, | Performed by: PODIATRIST

## 2025-02-24 PROCEDURE — 99499 UNLISTED E&M SERVICE: CPT | Mod: S$GLB,,, | Performed by: PODIATRIST

## 2025-04-14 DIAGNOSIS — I87.2 VENOUS STASIS DERMATITIS OF BOTH LOWER EXTREMITIES: ICD-10-CM

## 2025-04-14 DIAGNOSIS — Z78.9 STATIN INTOLERANCE: ICD-10-CM

## 2025-04-14 RX ORDER — TRIAMCINOLONE ACETONIDE 1 MG/G
CREAM TOPICAL 2 TIMES DAILY
Qty: 2000 G | Refills: 0 | Status: SHIPPED | OUTPATIENT
Start: 2025-04-14

## 2025-04-14 NOTE — TELEPHONE ENCOUNTER
No care due was identified.  [facility] Embedded Care Due Messages. Reference number: [id number].   4/14/2025 10:19:04 AM CDT

## 2025-04-14 NOTE — TELEPHONE ENCOUNTER
Refill Decision Note   Char Savage  is requesting a refill authorization.  Brief Assessment and Rationale for Refill:  Approve     Medication Therapy Plan:         Comments:     Note composed:11:43 AM 04/14/2025

## 2025-04-21 ENCOUNTER — LAB VISIT (OUTPATIENT)
Dept: LAB | Facility: HOSPITAL | Age: 85
End: 2025-04-21
Attending: INTERNAL MEDICINE
Payer: MEDICARE

## 2025-04-21 DIAGNOSIS — E79.0 HYPERURICEMIA: ICD-10-CM

## 2025-04-21 DIAGNOSIS — I89.0 LYMPHEDEMA OF BOTH LOWER EXTREMITIES: ICD-10-CM

## 2025-04-21 DIAGNOSIS — N18.4 CHRONIC KIDNEY DISEASE (CKD), STAGE IV (SEVERE): ICD-10-CM

## 2025-04-21 LAB
ABSOLUTE EOSINOPHIL (OHS): 0.17 K/UL
ABSOLUTE MONOCYTE (OHS): 0.89 K/UL (ref 0.3–1)
ABSOLUTE NEUTROPHIL COUNT (OHS): 3.89 K/UL (ref 1.8–7.7)
ALBUMIN SERPL BCP-MCNC: 3.9 G/DL (ref 3.5–5.2)
ANION GAP (OHS): 12 MMOL/L (ref 8–16)
BACTERIA #/AREA URNS AUTO: ABNORMAL /HPF
BASOPHILS # BLD AUTO: 0.07 K/UL
BASOPHILS NFR BLD AUTO: 0.9 %
BILIRUB UR QL STRIP.AUTO: NEGATIVE
BUN SERPL-MCNC: 52 MG/DL (ref 8–23)
CALCIUM SERPL-MCNC: 9.9 MG/DL (ref 8.7–10.5)
CHLORIDE SERPL-SCNC: 103 MMOL/L (ref 95–110)
CLARITY UR: CLEAR
CO2 SERPL-SCNC: 25 MMOL/L (ref 23–29)
COLOR UR AUTO: YELLOW
CREAT SERPL-MCNC: 2.3 MG/DL (ref 0.5–1.4)
CREAT UR-MCNC: 51 MG/DL (ref 15–325)
ERYTHROCYTE [DISTWIDTH] IN BLOOD BY AUTOMATED COUNT: 13.4 % (ref 11.5–14.5)
GFR SERPLBLD CREATININE-BSD FMLA CKD-EPI: 20 ML/MIN/1.73/M2
GLUCOSE SERPL-MCNC: 99 MG/DL (ref 70–110)
GLUCOSE UR QL STRIP: NEGATIVE
HCT VFR BLD AUTO: 40.4 % (ref 37–48.5)
HGB BLD-MCNC: 12.3 GM/DL (ref 12–16)
HGB UR QL STRIP: NEGATIVE
HYALINE CASTS UR QL AUTO: 6 /LPF (ref 0–1)
IMM GRANULOCYTES # BLD AUTO: 0.01 K/UL (ref 0–0.04)
IMM GRANULOCYTES NFR BLD AUTO: 0.1 % (ref 0–0.5)
KETONES UR QL STRIP: NEGATIVE
LEUKOCYTE ESTERASE UR QL STRIP: ABNORMAL
LYMPHOCYTES # BLD AUTO: 2.53 K/UL (ref 1–4.8)
MCH RBC QN AUTO: 28.1 PG (ref 27–31)
MCHC RBC AUTO-ENTMCNC: 30.4 G/DL (ref 32–36)
MCV RBC AUTO: 92 FL (ref 82–98)
MICROSCOPIC COMMENT: ABNORMAL
NITRITE UR QL STRIP: NEGATIVE
NUCLEATED RBC (/100WBC) (OHS): 0 /100 WBC
PH UR STRIP: 6 [PH]
PHOSPHATE SERPL-MCNC: 4.3 MG/DL (ref 2.7–4.5)
PLATELET # BLD AUTO: 307 K/UL (ref 150–450)
PMV BLD AUTO: 11.3 FL (ref 9.2–12.9)
POTASSIUM SERPL-SCNC: 4.3 MMOL/L (ref 3.5–5.1)
PROT UR QL STRIP: NEGATIVE
PROT UR-MCNC: 9 MG/DL
PROT/CREAT UR: 0.18 MG/G{CREAT}
PTH-INTACT SERPL-MCNC: 50.2 PG/ML (ref 9–77)
RBC # BLD AUTO: 4.38 M/UL (ref 4–5.4)
RBC #/AREA URNS AUTO: 3 /HPF (ref 0–4)
RELATIVE EOSINOPHIL (OHS): 2.2 %
RELATIVE LYMPHOCYTE (OHS): 33.5 % (ref 18–48)
RELATIVE MONOCYTE (OHS): 11.8 % (ref 4–15)
RELATIVE NEUTROPHIL (OHS): 51.5 % (ref 38–73)
SODIUM SERPL-SCNC: 140 MMOL/L (ref 136–145)
SP GR UR STRIP: 1.01
SQUAMOUS #/AREA URNS AUTO: 8 /HPF
URATE SERPL-MCNC: 5.9 MG/DL (ref 2.4–5.7)
UROBILINOGEN UR STRIP-ACNC: NEGATIVE EU/DL
WBC # BLD AUTO: 7.56 K/UL (ref 3.9–12.7)
WBC #/AREA URNS AUTO: 6 /HPF (ref 0–5)

## 2025-04-21 PROCEDURE — 85025 COMPLETE CBC W/AUTO DIFF WBC: CPT

## 2025-04-21 PROCEDURE — 80069 RENAL FUNCTION PANEL: CPT

## 2025-04-21 PROCEDURE — 81003 URINALYSIS AUTO W/O SCOPE: CPT

## 2025-04-21 PROCEDURE — 36415 COLL VENOUS BLD VENIPUNCTURE: CPT | Mod: PO

## 2025-04-21 PROCEDURE — 83970 ASSAY OF PARATHORMONE: CPT

## 2025-04-21 PROCEDURE — 84156 ASSAY OF PROTEIN URINE: CPT

## 2025-04-21 PROCEDURE — 84550 ASSAY OF BLOOD/URIC ACID: CPT

## 2025-04-25 NOTE — PROGRESS NOTES
[facility]  [address] [facility] [address], [address]  [place]  [phone]    PCP: Joselito Vickers MD  Cardiologist: Dmitry       HPI: Mrs. Char Savage is a 84 y.o. female with HTN, T2DM, and lymphedema who is here for established patient evaluation of Chronic Kidney Disease  Initial visit 4/30/24. Her baseline Cr has been 1.2-1.5 since 2009; though christal to 1.6-2.0 from 8/2023-1/2024. Her Cr is 1.4 today. No proteinuria.  She reports her T2DM has been well-controlled. Average A1c ~6.5% since 2016. She is not currently on diabetic medication. She is on lisinopril for HTN.  She denies h/o gout, kidney stones, or NSAID use. No family h/o kidney disease.   Her main concern is BLE edema which began ~2019. She reports undergoing extensive workup which was unremarkable. She was ultimately diagnosed with lymphedema. She wears compression socks and uses a lymphedema pump. She notes this has significantly improved her symptoms and has also improved her balance and ability to get around. She reports previously having weeping edema up to her knees; she no longer has drainage and edema only goes up to her shins.   She takes lasix 20mg BID. She notes augmented UOP. She was briefly on lasix 40mg BID; she noted increased UOP but no change in lymphedema on this regimen. She tries to follow a low salt diet.  She is hoping to get her ankle edema further improved.     4/30/24: reinforced low salt diet.     8/13/24: tense edema; increased lasix from 20mg BID to 40mg BID x 7 days, then resumed 20mg BID. Started allopurinol for uric acid > 11.      10/4/24: stopped allopurinol due to rash; started Uloric for hyperuricemia.       Interval Hx:   LV 12/19/24: no medication changes; reminded patient that Uloric was not being used for gout but for hyperuricemia.   Today she reports to be doing well. No medication changes or new medical diagnoses since last visit.  She has been suffering from constipation; not sure which medication is  "causing this. She has been using stool softeners with partial relief. She denies use of iron supplementation. BLE edema is controlled; no recent blisters or weeping.   She denies any dysuria.       ROS:  Complete ROS otherwise negative except as indicated above.       Current Medications[1]      Vitals: Blood pressure 134/68, pulse 63, resp. rate 18, height 5' 5" (1.651 m), weight 77.5 kg (170 lb 13.7 oz), SpO2 97%. Body mass index is 28.43 kg/m².    Physical Exam  Vitals reviewed.   Constitutional:       General: She is awake. She is not in acute distress.     Appearance: Normal appearance. She is well-developed.   HENT:      Head: Normocephalic and atraumatic.      Nose: Nose normal.      Mouth/Throat:      Mouth: Mucous membranes are moist.   Eyes:      Extraocular Movements: Extraocular movements intact.      Conjunctiva/sclera: Conjunctivae normal.   Pulmonary:      Effort: Pulmonary effort is normal.   Musculoskeletal:         General: Swelling present. No tenderness or signs of injury.      Right lower leg: Edema present.      Left lower leg: Edema present.      Comments: BLE with moderate non-pitting and trace pitting edema   Skin:     General: Skin is warm and dry.      Findings: No erythema or rash.   Neurological:      General: No focal deficit present.      Mental Status: She is alert. Mental status is at baseline.   Psychiatric:         Mood and Affect: Mood normal.         Behavior: Behavior normal.         Labs/Imaging:  Sodium   Date Value Ref Range Status   04/21/2025 140 136 - 145 mmol/L Final   12/06/2024 141 136 - 145 mmol/L Final   11/22/2024 140 136 - 145 mmol/L Final   09/26/2024 140 136 - 145 mmol/L Final     Potassium   Date Value Ref Range Status   04/21/2025 4.3 3.5 - 5.1 mmol/L Final   12/06/2024 4.9 3.5 - 5.1 mmol/L Final   11/22/2024 4.7 3.5 - 5.1 mmol/L Final   09/26/2024 4.6 3.5 - 5.1 mmol/L Final     Chloride   Date Value Ref Range Status   04/21/2025 103 95 - 110 mmol/L Final "   12/06/2024 104 95 - 110 mmol/L Final   11/22/2024 102 95 - 110 mmol/L Final   09/26/2024 104 95 - 110 mmol/L Final     CO2   Date Value Ref Range Status   04/21/2025 25 23 - 29 mmol/L Final   12/06/2024 27 23 - 29 mmol/L Final   11/22/2024 26 23 - 29 mmol/L Final   09/26/2024 24 23 - 29 mmol/L Final     BUN   Date Value Ref Range Status   04/21/2025 52 (H) 8 - 23 mg/dL Final   12/06/2024 33 (H) 8 - 23 mg/dL Final   11/22/2024 37 (H) 8 - 23 mg/dL Final   09/26/2024 32 (H) 8 - 23 mg/dL Final     Creatinine   Date Value Ref Range Status   04/21/2025 2.3 (H) 0.5 - 1.4 mg/dL Final   12/06/2024 2.1 (H) 0.5 - 1.4 mg/dL Final   11/22/2024 2.2 (H) 0.5 - 1.4 mg/dL Final   09/26/2024 2.2 (H) 0.5 - 1.4 mg/dL Final     eGFR   Date Value Ref Range Status   04/21/2025 20 (L) >60 mL/min/1.73/m2 Final     Comment:     Estimated GFR calculated using the CKD-EPI creatinine (2021) equation.   12/06/2024 22.5 (A) >60 mL/min/1.73 m^2 Final   11/22/2024 21.3 (A) >60 mL/min/1.73 m^2 Final   09/26/2024 21 (A) >60 mL/min/1.73 m^2 Final     Glucose   Date Value Ref Range Status   04/21/2025 99 70 - 110 mg/dL Final     Calcium   Date Value Ref Range Status   04/21/2025 9.9 8.7 - 10.5 mg/dL Final   12/06/2024 10.4 8.7 - 10.5 mg/dL Final   11/22/2024 10.0 8.7 - 10.5 mg/dL Final   09/26/2024 9.8 8.7 - 10.5 mg/dL Final     Phosphorus Level   Date Value Ref Range Status   04/21/2025 4.3 2.7 - 4.5 mg/dL Final     Phosphorus   Date Value Ref Range Status   12/06/2024 3.6 2.7 - 4.5 mg/dL Final   09/26/2024 3.7 2.7 - 4.5 mg/dL Final   07/29/2024 3.8 2.7 - 4.5 mg/dL Final     Albumin   Date Value Ref Range Status   04/21/2025 3.9 3.5 - 5.2 g/dL Final   12/06/2024 3.7 3.5 - 5.2 g/dL Final   11/22/2024 3.8 3.5 - 5.2 g/dL Final   09/26/2024 3.6 3.5 - 5.2 g/dL Final       PTH, Intact   Date Value Ref Range Status   12/06/2024 33.2 9.0 - 77.0 pg/mL Final   07/29/2024 54.2 9.0 - 77.0 pg/mL Final   04/24/2024 42.4 9.0 - 77.0 pg/mL Final     PTH Intact   Date  Value Ref Range Status   04/21/2025 50.2 9.0 - 77.0 pg/mL Final     Vit D, 25-Hydroxy   Date Value Ref Range Status   11/22/2024 41 30 - 96 ng/mL Final     Comment:     Vitamin D deficiency.........<10 ng/mL                              Vitamin D insufficiency......10-29 ng/mL       Vitamin D sufficiency........> or equal to 30 ng/mL  Vitamin D toxicity............>100 ng/mL       Uric Acid   Date Value Ref Range Status   12/06/2024 5.3 2.4 - 5.7 mg/dL Final   09/26/2024 11.1 (H) 2.4 - 5.7 mg/dL Final   07/29/2024 11.6 (H) 2.4 - 5.7 mg/dL Final       Hemoglobin   Date Value Ref Range Status   12/06/2024 12.5 12.0 - 16.0 g/dL Final   07/29/2024 12.5 12.0 - 16.0 g/dL Final   04/24/2024 13.5 12.0 - 16.0 g/dL Final     HGB   Date Value Ref Range Status   04/21/2025 12.3 12.0 - 16.0 gm/dL Final       Urine Protein/Creatinine Ratio   Date Value Ref Range Status   04/21/2025 0.18 <=0.20 Final     Prot/Creat Ratio, Urine   Date Value Ref Range Status   12/06/2024 0.17 0.00 - 0.20 Final   07/29/2024 0.22 (H) 0.00 - 0.20 Final   04/24/2024 Unable to calculate 0.00 - 0.20 Final         Renal US: 7/29/24:   The right kidney measures 10.8 cm, RI 0.77.  Left kidney measures 9.9 cm, RI 0.78.  Kidneys demonstrate no hydronephrosis.  There are 2 cysts of the right kidney the largest upper pole measuring 1.9 cm.  There are 2 small cyst lower pole left kidney the largest measuring 1.2 cm.      Impression/Plan:    Chronic kidney disease (CKD), stage IV (severe)  - baseline Cr 2.1-2.3 since 4/2024; due to longstanding HTN/DM  - Cr 2.1 --> 2.3 today; stable and at baseline  - UPCR 0.18; negative. Continue lisinopril.  - K 4.3; improved.   - continue lasix 20mg BID  - good water intake; low salt diet; avoid NSAIDs    Hyperuricemia  - no h/o gout or kidney stones  - didn't tolerate allopurinol due to rash  - uric acid 11.1 prior to starting Uloric; cannot rule out uric acid nephropathy  - uric acid 5.3 --> 5.9 today; at goal  - continue  Uloric 40mg daily. So far no improvement in renal function. Will possibly stop or decrease to QOD at next visit.  - unlikely that Uloric is causing constipation; Nexlizet with higher risk of constipation. Encouraged patient to discuss with cardiologist.      Lymphedema of both lower extremities  - stable  - continue lasix 20mg BID  - low salt diet    Essential hypertension  - controlled; continue RAAS blockade      Renal cyst  - each kidney with 2 simple kidney cysts  - no need for follow-up per current guidelines        Visit today included increased complexity associated with the care of the episodic problem hyperuricemia addressed and managing the longitudinal care of the patient due to the serious and/or complex managed problem(s) CKD.      Treatment options and plan were discussed with the patient and/or caregiver.   RTC 4 months.       Alison Gomez MD  [facility]  [phone]         [1]   Current Outpatient Medications:     aspirin (ECOTRIN) 81 MG EC tablet, Take 81 mg by mouth once daily., Disp: , Rfl:     bempedoic acid-ezetimibe 180-10 mg Tab, Take 1 tablet by mouth daily., Disp: 90 tablet, Rfl: 1    febuxostat (ULORIC) 40 mg Tab, Take 1 tablet (40 mg total) by mouth once daily., Disp: 30 tablet, Rfl: 11    furosemide (LASIX) 20 MG tablet, TAKE 1 TABLET BY MOUTH TWICE A DAY AS NEEDED FOR LEG SWELLING, Disp: 180 tablet, Rfl: 3    lisinopriL (PRINIVIL,ZESTRIL) 20 MG tablet, TAKE 1 TABLET BY MOUTH EVERY DAY, Disp: 90 tablet, Rfl: 2    metoprolol tartrate (LOPRESSOR) 100 MG tablet, Take 1 tablet (100 mg total) by mouth 2 (two) times daily., Disp: 180 tablet, Rfl: 3    multivit-minerals/folic acid (CENTRUM ADULT 50 PLUS ORAL), Take by mouth once daily., Disp: , Rfl:     omeprazole (PRILOSEC) 20 MG capsule, Take 1 capsule (20 mg total) by mouth once daily., Disp: 90 capsule, Rfl: 3    triamcinolone acetonide 0.1% (KENALOG) 0.1 % cream, Apply topically 2 (two) times daily. Add, overdue for diabetic  appt with Dr LOMAX, Disp: 2000 g, Rfl: 0

## 2025-04-28 ENCOUNTER — OFFICE VISIT (OUTPATIENT)
Dept: NEPHROLOGY | Facility: CLINIC | Age: 85
End: 2025-04-28
Payer: MEDICARE

## 2025-04-28 VITALS
HEART RATE: 63 BPM | SYSTOLIC BLOOD PRESSURE: 134 MMHG | BODY MASS INDEX: 28.47 KG/M2 | WEIGHT: 170.88 LBS | HEIGHT: 65 IN | DIASTOLIC BLOOD PRESSURE: 68 MMHG | RESPIRATION RATE: 18 BRPM | OXYGEN SATURATION: 97 %

## 2025-04-28 DIAGNOSIS — E79.0 HYPERURICEMIA: ICD-10-CM

## 2025-04-28 DIAGNOSIS — I10 ESSENTIAL HYPERTENSION: ICD-10-CM

## 2025-04-28 DIAGNOSIS — N18.4 CHRONIC KIDNEY DISEASE (CKD), STAGE IV (SEVERE): Primary | ICD-10-CM

## 2025-04-28 DIAGNOSIS — I89.0 LYMPHEDEMA OF BOTH LOWER EXTREMITIES: ICD-10-CM

## 2025-04-28 PROCEDURE — 3072F LOW RISK FOR RETINOPATHY: CPT | Mod: CPTII,S$GLB,, | Performed by: INTERNAL MEDICINE

## 2025-04-28 PROCEDURE — 99999 PR PBB SHADOW E&M-EST. PATIENT-LVL IV: CPT | Mod: PBBFAC,,, | Performed by: INTERNAL MEDICINE

## 2025-04-28 PROCEDURE — 1101F PT FALLS ASSESS-DOCD LE1/YR: CPT | Mod: CPTII,S$GLB,, | Performed by: INTERNAL MEDICINE

## 2025-04-28 PROCEDURE — 99214 OFFICE O/P EST MOD 30 MIN: CPT | Mod: S$GLB,,, | Performed by: INTERNAL MEDICINE

## 2025-04-28 PROCEDURE — G2211 COMPLEX E/M VISIT ADD ON: HCPCS | Mod: S$GLB,,, | Performed by: INTERNAL MEDICINE

## 2025-04-28 PROCEDURE — 1159F MED LIST DOCD IN RCRD: CPT | Mod: CPTII,S$GLB,, | Performed by: INTERNAL MEDICINE

## 2025-04-28 PROCEDURE — 3288F FALL RISK ASSESSMENT DOCD: CPT | Mod: CPTII,S$GLB,, | Performed by: INTERNAL MEDICINE

## 2025-04-28 PROCEDURE — 1125F AMNT PAIN NOTED PAIN PRSNT: CPT | Mod: CPTII,S$GLB,, | Performed by: INTERNAL MEDICINE

## 2025-04-28 PROCEDURE — 1160F RVW MEDS BY RX/DR IN RCRD: CPT | Mod: CPTII,S$GLB,, | Performed by: INTERNAL MEDICINE

## 2025-04-28 NOTE — ASSESSMENT & PLAN NOTE
- no h/o gout or kidney stones  - didn't tolerate allopurinol due to rash  - uric acid 11.1 prior to starting Uloric; cannot rule out uric acid nephropathy  - uric acid 5.3 --> 5.9 today; at goal  - continue Uloric 40mg daily. So far no improvement in renal function. Will possibly stop or decrease to QOD at next visit.  - unlikely that Uloric is causing constipation; Nexlizet with higher risk of constipation. Encouraged patient to discuss with cardiologist.

## 2025-04-28 NOTE — PATIENT INSTRUCTIONS
Make sure to ask Dr. Andres about your cholesterol medication, and if he thinks this is contributing to your constipation.

## 2025-04-28 NOTE — ASSESSMENT & PLAN NOTE
- baseline Cr 2.1-2.3 since 4/2024; due to longstanding HTN/DM  - Cr 2.1 --> 2.3 today; stable and at baseline  - UPCR 0.18; negative. Continue lisinopril.  - K 4.3; improved.   - continue lasix 20mg BID  - good water intake; low salt diet; avoid NSAIDs

## 2025-05-14 ENCOUNTER — OFFICE VISIT (OUTPATIENT)
Dept: PODIATRY | Facility: CLINIC | Age: 85
End: 2025-05-14
Payer: MEDICARE

## 2025-05-14 VITALS — WEIGHT: 170.88 LBS | BODY MASS INDEX: 28.47 KG/M2 | HEIGHT: 65 IN

## 2025-05-14 DIAGNOSIS — I89.0 LYMPHEDEMA OF BOTH LOWER EXTREMITIES: Primary | ICD-10-CM

## 2025-05-14 DIAGNOSIS — B35.1 ONYCHOMYCOSIS: ICD-10-CM

## 2025-05-14 PROCEDURE — 99999 PR PBB SHADOW E&M-EST. PATIENT-LVL III: CPT | Mod: PBBFAC,,, | Performed by: PODIATRIST

## 2025-05-15 NOTE — PROGRESS NOTES
Subjective:     Patient ID: Char Savage is a 84 y.o. female.    Chief Complaint: Nail Care, Diabetes Mellitus (8/21/24 Dr Vickers), and Foot Swelling    Char is a 84 y.o. female who presents to the clinic for evaluation and treatment of high risk feet. Char has a past medical history of Benign hypertension with chronic kidney disease, stage III (4/14/2015), Hyperlipidemia, Hypertension, Left eye injury (12/98), Nevus of choroid, Osteopenia (3/10/2022), Type 2 diabetes mellitus with stage 3 chronic kidney disease, without long-term current use of insulin (10/17/2017), Type 2 diabetes mellitus without complication (10/17/2017), and Venous stasis dermatitis of both lower extremities (12/11/2019). The patient's chief complaint is long, thick toenails. This patient has documented high risk feet requiring routine maintenance secondary to diabetes mellitis and those secondary complications of diabetes, as mentioned..    PCP: Joselito Vickers MD    Date Last Seen by PCP: per above        Hemoglobin A1C   Date Value Ref Range Status   11/22/2024 6.1 (H) 4.0 - 5.6 % Final     Comment:     ADA Screening Guidelines:  5.7-6.4%  Consistent with prediabetes  >or=6.5%  Consistent with diabetes    High levels of fetal hemoglobin interfere with the HbA1C  assay. Heterozygous hemoglobin variants (HbS, HgC, etc)do  not significantly interfere with this assay.   However, presence of multiple variants may affect accuracy.     04/24/2024 6.5 (H) 4.0 - 5.6 % Final     Comment:     ADA Screening Guidelines:  5.7-6.4%  Consistent with prediabetes  >or=6.5%  Consistent with diabetes    High levels of fetal hemoglobin interfere with the HbA1C  assay. Heterozygous hemoglobin variants (HbS, HgC, etc)do  not significantly interfere with this assay.   However, presence of multiple variants may affect accuracy.     08/02/2023 6.5 (H) 4.0 - 5.6 % Final     Comment:     ADA Screening Guidelines:  5.7-6.4%  Consistent with  prediabetes  >or=6.5%  Consistent with diabetes    High levels of fetal hemoglobin interfere with the HbA1C  assay. Heterozygous hemoglobin variants (HbS, HgC, etc)do  not significantly interfere with this assay.   However, presence of multiple variants may affect accuracy.         Review of Systems   Constitutional: Negative for chills.   Cardiovascular:  Negative for chest pain and claudication.   Respiratory:  Negative for cough.    Skin:  Positive for color change, dry skin and nail changes.   Musculoskeletal:  Positive for joint pain.   Gastrointestinal:  Negative for nausea.   Neurological:  Positive for paresthesias. Negative for numbness.   Psychiatric/Behavioral:  The patient is not nervous/anxious.         Objective:     Physical Exam  Constitutional:       Appearance: She is well-developed.      Comments: Oriented to time, place, and person.   Cardiovascular:      Comments: DP and PT pulses are palpable bilaterally. 3 sec capillary refill time and toes and feet are warm to touch proximally .  There is  hair growth on the feet and toes b/l. Edema noted B/L     Musculoskeletal:      Comments: Equinus noted b/l ankles with < 10 deg DF noted. MMT 5/5 in DF/PF/Inv/Ev resistance with no reproduction of pain in any direction. Passive range of motion of ankle and pedal joints is painless b/l.     Feet:      Right foot:      Skin integrity: No callus or dry skin.      Left foot:      Skin integrity: No callus or dry skin.   Lymphadenopathy:      Comments: Negative lymphadenopathy bilateral popliteal fossa and tarsal tunnel.   Skin:     Comments: Toenails 1-5 bilaterally are elongated by 2-3 mm, thickened by 2-3 mm, discolored/yellowed, dystrophic, brittle with subungual debris.     Neurological:      Mental Status: She is alert.      Comments: Light touch, proprioception, and sharp/dull sensation are all intact bilaterally. Protective threshold with the [place]-Wienstein monofilament is intact bilaterally.     Psychiatric:         Behavior: Behavior is cooperative.           Assessment:      Encounter Diagnoses   Name Primary?    Lymphedema of both lower extremities Yes    Onychomycosis              Plan:     Char was seen today for nail care, diabetes mellitus and foot swelling.    Diagnoses and all orders for this visit:    Lymphedema of both lower extremities    Onychomycosis              I counseled the patient on her conditions, their implications and medical management.    - Shoe inspection. Diabetic Foot Education. Patient reminded of the importance of good nutrition and blood sugar control to help prevent podiatric complications of diabetes. Patient instructed on proper foot hygeine. We discussed wearing proper shoe gear, daily foot inspections, never walking without protective shoe gear, never putting sharp instruments to feet, routine podiatric nail visits every 2-3 months.   - With patient's permission, nails were aggressively reduced and debrided x 10 to their soft tissue attachment mechanically and with electric , removing all offending nail and debris. Patient relates relief following the procedure. He will continue to monitor the areas daily, inspect his feet, wear protective shoe gear when ambulatory, moisturizer to maintain skin integrity and follow in this office in approximately 2-3 months, sooner p.r.n.

## 2025-05-20 ENCOUNTER — OFFICE VISIT (OUTPATIENT)
Dept: OPTOMETRY | Facility: CLINIC | Age: 85
End: 2025-05-20
Payer: MEDICARE

## 2025-05-20 DIAGNOSIS — H52.203 MYOPIA WITH ASTIGMATISM AND PRESBYOPIA, BILATERAL: ICD-10-CM

## 2025-05-20 DIAGNOSIS — H52.13 MYOPIA WITH ASTIGMATISM AND PRESBYOPIA, BILATERAL: ICD-10-CM

## 2025-05-20 DIAGNOSIS — Z01.00 EYE EXAM, ROUTINE: Primary | ICD-10-CM

## 2025-05-20 DIAGNOSIS — H52.4 MYOPIA WITH ASTIGMATISM AND PRESBYOPIA, BILATERAL: ICD-10-CM

## 2025-05-20 PROCEDURE — 3288F FALL RISK ASSESSMENT DOCD: CPT | Mod: CPTII,S$GLB,, | Performed by: OPTOMETRIST

## 2025-05-20 PROCEDURE — 1159F MED LIST DOCD IN RCRD: CPT | Mod: CPTII,S$GLB,, | Performed by: OPTOMETRIST

## 2025-05-20 PROCEDURE — 2023F DILAT RTA XM W/O RTNOPTHY: CPT | Mod: CPTII,S$GLB,, | Performed by: OPTOMETRIST

## 2025-05-20 PROCEDURE — 1101F PT FALLS ASSESS-DOCD LE1/YR: CPT | Mod: CPTII,S$GLB,, | Performed by: OPTOMETRIST

## 2025-05-20 PROCEDURE — 92014 COMPRE OPH EXAM EST PT 1/>: CPT | Mod: S$GLB,,, | Performed by: OPTOMETRIST

## 2025-05-20 PROCEDURE — 99999 PR PBB SHADOW E&M-EST. PATIENT-LVL II: CPT | Mod: PBBFAC,,, | Performed by: OPTOMETRIST

## 2025-05-20 PROCEDURE — 92015 DETERMINE REFRACTIVE STATE: CPT | Mod: S$GLB,,, | Performed by: OPTOMETRIST

## 2025-05-20 NOTE — PROGRESS NOTES
HPI    Here for annual DM eye exam   Unscheduled GLC suspect    Eye meds: Lubricating drops OU PRN     84 year old female states she has blurry vision with her glasses from last   visit. Denies flashes, floaters or diplopia. Denies itchy eyes. Pt says   she occasionally has light sensitivity     Hemoglobin A1C       Date                     Value               Ref Range             Status                11/22/2024               6.1 (H)             4.0 - 5.6 %           Final               ----------    Last edited by Julien Ferrari, OD on 5/20/2025  9:36 AM.            Assessment /Plan     For exam results, see Encounter Report.    Eye exam, routine  -No retinopathy noted today.  Continued control with primary care physician and annual comprehensive eye exam.  -OAG suspect, long standing no progression noted. No imaging available today  -Systane PRN especially blur  -Spectera    Myopia with astigmatism and presbyopia, bilateral  Eyeglass Final Rx       Eyeglass Final Rx         Sphere Cylinder Axis Dist VA Add    Right -2.50 +3.50 010 20/30 +2.50    Left -3.25 +3.25 165 20/30 +2.50      Type: PAL    Expiration Date: 5/20/2026                      RTC 1 yr

## 2025-05-21 ENCOUNTER — LAB VISIT (OUTPATIENT)
Dept: LAB | Facility: HOSPITAL | Age: 85
End: 2025-05-21
Attending: INTERNAL MEDICINE
Payer: MEDICARE

## 2025-05-21 DIAGNOSIS — E78.2 MIXED HYPERLIPIDEMIA: ICD-10-CM

## 2025-05-21 LAB
ALBUMIN SERPL BCP-MCNC: 3.6 G/DL (ref 3.5–5.2)
ALP SERPL-CCNC: 63 UNIT/L (ref 40–150)
ALT SERPL W/O P-5'-P-CCNC: 11 UNIT/L (ref 10–44)
ANION GAP (OHS): 12 MMOL/L (ref 8–16)
AST SERPL-CCNC: 23 UNIT/L (ref 11–45)
BILIRUB SERPL-MCNC: 0.7 MG/DL (ref 0.1–1)
BUN SERPL-MCNC: 43 MG/DL (ref 8–23)
CALCIUM SERPL-MCNC: 9.7 MG/DL (ref 8.7–10.5)
CHLORIDE SERPL-SCNC: 102 MMOL/L (ref 95–110)
CHOLEST SERPL-MCNC: 126 MG/DL (ref 120–199)
CHOLEST/HDLC SERPL: 2.1 {RATIO} (ref 2–5)
CO2 SERPL-SCNC: 27 MMOL/L (ref 23–29)
CREAT SERPL-MCNC: 2.1 MG/DL (ref 0.5–1.4)
GFR SERPLBLD CREATININE-BSD FMLA CKD-EPI: 23 ML/MIN/1.73/M2
GLUCOSE SERPL-MCNC: 107 MG/DL (ref 70–110)
HDLC SERPL-MCNC: 59 MG/DL (ref 40–75)
HDLC SERPL: 46.8 % (ref 20–50)
LDLC SERPL CALC-MCNC: 50.8 MG/DL (ref 63–159)
NONHDLC SERPL-MCNC: 67 MG/DL
POTASSIUM SERPL-SCNC: 4.6 MMOL/L (ref 3.5–5.1)
PROT SERPL-MCNC: 7.3 GM/DL (ref 6–8.4)
SODIUM SERPL-SCNC: 141 MMOL/L (ref 136–145)
TRIGL SERPL-MCNC: 81 MG/DL (ref 30–150)

## 2025-05-21 PROCEDURE — 36415 COLL VENOUS BLD VENIPUNCTURE: CPT | Mod: PO

## 2025-05-21 PROCEDURE — 82465 ASSAY BLD/SERUM CHOLESTEROL: CPT

## 2025-05-21 PROCEDURE — 82947 ASSAY GLUCOSE BLOOD QUANT: CPT

## 2025-05-28 ENCOUNTER — OFFICE VISIT (OUTPATIENT)
Dept: CARDIOLOGY | Facility: CLINIC | Age: 85
End: 2025-05-28
Payer: MEDICARE

## 2025-05-28 VITALS
HEART RATE: 60 BPM | WEIGHT: 170 LBS | BODY MASS INDEX: 28.32 KG/M2 | DIASTOLIC BLOOD PRESSURE: 78 MMHG | SYSTOLIC BLOOD PRESSURE: 154 MMHG | HEIGHT: 65 IN

## 2025-05-28 DIAGNOSIS — N18.4 CHRONIC KIDNEY DISEASE (CKD), STAGE IV (SEVERE): ICD-10-CM

## 2025-05-28 DIAGNOSIS — I87.2 VENOUS STASIS DERMATITIS OF BOTH LOWER EXTREMITIES: ICD-10-CM

## 2025-05-28 DIAGNOSIS — E11.69 MIXED DIABETIC HYPERLIPIDEMIA ASSOCIATED WITH TYPE 2 DIABETES MELLITUS: ICD-10-CM

## 2025-05-28 DIAGNOSIS — I12.9 BENIGN HYPERTENSION WITH CHRONIC KIDNEY DISEASE, STAGE III: ICD-10-CM

## 2025-05-28 DIAGNOSIS — I10 ESSENTIAL HYPERTENSION: ICD-10-CM

## 2025-05-28 DIAGNOSIS — E78.2 MIXED DIABETIC HYPERLIPIDEMIA ASSOCIATED WITH TYPE 2 DIABETES MELLITUS: ICD-10-CM

## 2025-05-28 DIAGNOSIS — R60.0 EDEMA OF BOTH LOWER EXTREMITIES: Primary | ICD-10-CM

## 2025-05-28 DIAGNOSIS — E66.01 CLASS 2 SEVERE OBESITY DUE TO EXCESS CALORIES WITH SERIOUS COMORBIDITY AND BODY MASS INDEX (BMI) OF 35.0 TO 35.9 IN ADULT: ICD-10-CM

## 2025-05-28 DIAGNOSIS — I70.0 AORTIC ATHEROSCLEROSIS: ICD-10-CM

## 2025-05-28 DIAGNOSIS — R20.8 BURNING SENSATION OF FEET: ICD-10-CM

## 2025-05-28 DIAGNOSIS — N18.30 BENIGN HYPERTENSION WITH CHRONIC KIDNEY DISEASE, STAGE III: ICD-10-CM

## 2025-05-28 DIAGNOSIS — M89.9 CHRONIC KIDNEY DISEASE-MINERAL AND BONE DISORDER (CKD-MBD): ICD-10-CM

## 2025-05-28 DIAGNOSIS — E66.812 CLASS 2 SEVERE OBESITY DUE TO EXCESS CALORIES WITH SERIOUS COMORBIDITY AND BODY MASS INDEX (BMI) OF 35.0 TO 35.9 IN ADULT: ICD-10-CM

## 2025-05-28 DIAGNOSIS — N18.9 CHRONIC KIDNEY DISEASE-MINERAL AND BONE DISORDER (CKD-MBD): ICD-10-CM

## 2025-05-28 DIAGNOSIS — M79.3 LIPODERMATOSCLEROSIS OF BOTH LOWER EXTREMITIES: ICD-10-CM

## 2025-05-28 DIAGNOSIS — E78.2 MIXED HYPERLIPIDEMIA: ICD-10-CM

## 2025-05-28 DIAGNOSIS — I89.0 LYMPHEDEMA OF BOTH LOWER EXTREMITIES: ICD-10-CM

## 2025-05-28 DIAGNOSIS — N18.32 STAGE 3B CHRONIC KIDNEY DISEASE: ICD-10-CM

## 2025-05-28 DIAGNOSIS — E83.9 CHRONIC KIDNEY DISEASE-MINERAL AND BONE DISORDER (CKD-MBD): ICD-10-CM

## 2025-05-28 PROCEDURE — 1126F AMNT PAIN NOTED NONE PRSNT: CPT | Mod: CPTII,S$GLB,, | Performed by: INTERNAL MEDICINE

## 2025-05-28 PROCEDURE — 1159F MED LIST DOCD IN RCRD: CPT | Mod: CPTII,S$GLB,, | Performed by: INTERNAL MEDICINE

## 2025-05-28 PROCEDURE — 99215 OFFICE O/P EST HI 40 MIN: CPT | Mod: S$GLB,,, | Performed by: INTERNAL MEDICINE

## 2025-05-28 PROCEDURE — 99999 PR PBB SHADOW E&M-EST. PATIENT-LVL IV: CPT | Mod: PBBFAC,,, | Performed by: INTERNAL MEDICINE

## 2025-05-28 PROCEDURE — 3288F FALL RISK ASSESSMENT DOCD: CPT | Mod: CPTII,S$GLB,, | Performed by: INTERNAL MEDICINE

## 2025-05-28 PROCEDURE — 1101F PT FALLS ASSESS-DOCD LE1/YR: CPT | Mod: CPTII,S$GLB,, | Performed by: INTERNAL MEDICINE

## 2025-05-28 NOTE — PROGRESS NOTES
[facility]      Chief Complaint   Patient presents with    lymphedema       Patient ID: Char Savage is a 84 y.o. female with BLE lymphedema, venous insufficiency, HTN, HLD, DM2, CKD stage 3, obesity, who presents for a follow up appointment.  Pertinent history/events are as follows:     -Pt kindly referred by Dr. Vickers for evaluation of leg swelling.     -At our initial clinic visit on 11/17/2023, Mrs. Savage reported leg swelling for several years.  She reports periods of ulcers/wounds in 2019.  No ulcers/wounds currently.  She has no claudication, no chest pain or SOB.  Currently taking lasix 40 mg bid.  BNP mildly elevated at 142.  BLE Venous Ultrasound on 11/6/2023 demonstrated no evidence of deep venous thrombosis in either lower extremity.   Plan:   Leg Swelling- Due to chronic venous insufficiency and BLE lymphedema.  Check JUANCARLOS study.  Refer to lymphedema clinic.  Decrease lasix to 20 mg bid.  Check cmp today and in 1 week.  Recommend wearing graduated compression hose.  Limit sodium intake to 2000 mg daily.  Limit volume intake to 1.5 L daily.  Elevate legs when resting.  HTN- Continue current medication.  HLD- Check lipids prior to next visit.  Plan to start meds next visit.  Pt with documented statin intolerance.   Obesity- Encourage diet, exercise, and weight loss.    -At follow up clinic visit on 5/10/2024, Mrs. Savage reports significant improvement in leg swelling.  She has completed lymphedema clinic therapy and continues to perform techniques at home.   Plan:  Leg Swelling- Due to chronic venous insufficiency and BLE lymphedema.  Now significantly improved and stable. Continue lymphedema clinic techniques at home.  Continue lasix 20 mg bid.  Continue graduated compression hose.  Limit sodium intake to 2000 mg daily.  Limit volume intake to 1.5 L daily.  Elevate legs when resting.  HTN- Continue current medication.  HLD-  on 5/2/2024. Pt with documented statin  intolerance. Start bempedoic acid-zetia.     Obesity- Encourage diet, exercise, and weight loss.    11/29/2024 clinic visit: Mrs. Savage reported continued improvement in leg swelling.  She is tolerating bempedoic acid-zetia 180-10 mg daily with no issues. LDL 70 on 11/22/2024.   Plan:  Leg Swelling- Due to chronic venous insufficiency and BLE lymphedema.  Now significantly improved and stable. Continue lymphedema clinic techniques at home.  Continue lasix 20 mg bid.  Continue graduated compression hose.  Limit sodium intake to 2000 mg daily.  Limit volume intake to 1.5 L daily.  Elevate legs when resting.  HTN- Continue current medication.  HLD- Pt with documented statin intolerance. he is tolerating bempedoic acid-zetia 180-10 mg daily with no issues. LDL 70 on 11/22/2024. Continue bempedoic acid-zetia.     Obesity- Encourage diet, exercise, and weight loss.    HPI:  Mrs. Savage reports continued improvement in leg swelling.  She is tolerating bempedoic acid-zetia 180-10 mg daily with no issues. LDL 51 on 5/21/2025.compared to 70 on 11/22/2024.     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    Osteopenia 3/10/2022    Type 2 diabetes mellitus with stage 3 chronic kidney disease, without long-term current use of insulin 10/17/2017    Cannot tolerate metformin     Type 2 diabetes mellitus without complication 10/17/2017    Venous stasis dermatitis of both lower extremities 12/11/2019     Past Surgical History:   Procedure Laterality Date    APPENDECTOMY      BREAST BIOPSY Bilateral     CATARACT EXTRACTION  1993/2000    od/os    COLONOSCOPY      HYSTERECTOMY  07/1972    ORBITAL FRACTURE SURGERY  12/1998    os dr coleman    PARTIAL HYSTERECTOMY      RIB FRACTURE SURGERY      TONSILLECTOMY,  ADENOIDECTOMY       Social History     Socioeconomic History    Marital status:    Tobacco Use    Smoking status: Never    Smokeless tobacco: Never   Substance and Sexual Activity    Alcohol use: No     Alcohol/week: 0.0 standard drinks of alcohol    Drug use: No     Social Drivers of Health     Financial Resource Strain: Low Risk  (2/17/2025)    Overall Financial Resource Strain (CARDIA)     Difficulty of Paying Living Expenses: Not hard at all   Food Insecurity: No Food Insecurity (2/17/2025)    Hunger Vital Sign     Worried About Running Out of Food in the Last Year: Never true     Ran Out of Food in the Last Year: Never true   Transportation Needs: No Transportation Needs (2/17/2025)    PRAPARE - Transportation     Lack of Transportation (Medical): No     Lack of Transportation (Non-Medical): No   Physical Activity: Unknown (2/17/2025)    Exercise Vital Sign     Days of Exercise per Week: Patient declined     Minutes of Exercise per Session: 0 min   Stress: Patient Declined (2/17/2025)    [employer] - Occupational Stress Questionnaire     Feeling of Stress : Patient declined   Housing Stability: Low Risk  (2/17/2025)    Housing Stability Vital Sign     Unable to Pay for Housing in the Last Year: No     Homeless in the Last Year: No     Family History   Problem Relation Name Age of Onset    Cancer Mother      Breast cancer Mother      Cataracts Father      Hypertension Father      No Known Problems Sister      No Known Problems Brother      No Known Problems Maternal Aunt      No Known Problems Maternal Uncle      No Known Problems Paternal Aunt      No Known Problems Paternal Uncle      No Known Problems Maternal Grandmother      No Known Problems Maternal Grandfather      No Known Problems Paternal Grandmother      No Known Problems Paternal Grandfather      Amblyopia Neg Hx      Blindness Neg Hx      Diabetes Neg Hx      Glaucoma Neg Hx      Macular degeneration Neg Hx       Retinal detachment Neg Hx      Strabismus Neg Hx      Stroke Neg Hx      Thyroid disease Neg Hx         Review of patient's allergies indicates:   Allergen Reactions    Amoxicillin Other (See Comments)    Bactrim [sulfamethoxazole-trimethoprim] Other (See Comments)    Darvon [propoxyphene] Other (See Comments)    Statins-hmg-coa reductase inhibitors      Flu symptoms    Toradol [ketorolac] Other (See Comments)    Allopurinol Rash    Metformin      Dehydration    Vibramycin [doxycycline calcium] Rash       Medication List with Changes/Refills   Current Medications    ASPIRIN (ECOTRIN) 81 MG EC TABLET    Take 81 mg by mouth once daily.    BEMPEDOIC ACID-EZETIMIBE 180-10 MG TAB    Take 1 tablet by mouth daily.    FEBUXOSTAT (ULORIC) 40 MG TAB    Take 1 tablet (40 mg total) by mouth once daily.    FUROSEMIDE (LASIX) 20 MG TABLET    TAKE 1 TABLET BY MOUTH TWICE A DAY AS NEEDED FOR LEG SWELLING    LISINOPRIL (PRINIVIL,ZESTRIL) 20 MG TABLET    TAKE 1 TABLET BY MOUTH EVERY DAY    METOPROLOL TARTRATE (LOPRESSOR) 100 MG TABLET    Take 1 tablet (100 mg total) by mouth 2 (two) times daily.    MULTIVIT-MINERALS/FOLIC ACID (CENTRUM ADULT 50 PLUS ORAL)    Take by mouth once daily.    OMEPRAZOLE (PRILOSEC) 20 MG CAPSULE    Take 1 capsule (20 mg total) by mouth once daily.    TRIAMCINOLONE ACETONIDE 0.1% (KENALOG) 0.1 % CREAM    Apply topically 2 (two) times daily. Add, overdue for diabetic appt with Dr LOMAX       Review of Systems  Constitution: Denies chills, fever, and sweats.  HENT: Denies headaches or blurry vision.  Cardiovascular: Denies chest pain or irregular heart beat.  Respiratory: Denies cough or shortness of breath.  Gastrointestinal: Denies abdominal pain, nausea, or vomiting.  Musculoskeletal: Denies muscle cramps.  Neurological: Denies dizziness or focal weakness.  Psychiatric/Behavioral: Normal mental status.  Hematologic/Lymphatic: Denies bleeding problem or easy bruising/bleeding.  Skin: Denies rash or suspicious  "lesions    Physical Examination  BP (!) 154/78   Pulse 60   Ht 5' 5" (1.651 m)   Wt 77.1 kg (169 lb 15.6 oz)   BMI 28.29 kg/m²     Constitutional: No acute distress, conversant  HEENT: Sclera anicteric, Pupils equal, round and reactive to light, extraocular motions intact, Oropharynx clear  Neck: No JVD, no carotid bruits  Cardiovascular: regular rate and rhythm, no murmur, rubs or gallops, normal S1/S2  Pulmonary: Clear to auscultation bilaterally  Abdominal: Abdomen soft, nontender, nondistended, positive bowel sounds  Extremities: No lower extremity edema,   Pulses:  Carotid pulses are 2+ on the right side, and 2+ on the left side.  Radial pulses are 2+ on the right side, and 2+ on the left side.   Femoral pulses are 2+ on the right side, and 2+ on the left side.  Popliteal pulses are 2+ on the right side, and 2+ on the left side.   Dorsalis pedis pulses are 2+ on the right side, and 2+ on the left side.   Posterior tibial pulses are 2+ on the right side, and 2+ on the left side.    Skin: No ecchymosis, erythema, or ulcers  Psych: Alert and oriented x 3, appropriate affect  Neuro: CNII-XII intact, no focal deficits    Labs:  Most Recent Data  CBC:   Lab Results   Component Value Date    WBC 7.56 04/21/2025    HGB 12.3 04/21/2025    HCT 40.4 04/21/2025     04/21/2025    MCV 92 04/21/2025    RDW 13.4 04/21/2025     BMP:   Lab Results   Component Value Date     05/21/2025    K 4.6 05/21/2025     05/21/2025    CO2 27 05/21/2025    BUN 43 (H) 05/21/2025    CREATININE 2.1 (H) 05/21/2025     05/21/2025    CALCIUM 9.7 05/21/2025    PHOS 4.3 04/21/2025     LFTS;   Lab Results   Component Value Date    PROT 7.3 05/21/2025    ALBUMIN 3.6 05/21/2025    BILITOT 0.7 05/21/2025    AST 23 05/21/2025    ALKPHOS 63 05/21/2025    ALT 11 05/21/2025     COAGS: No results found for: "INR", "PROTIME", "PTT"  FLP:   Lab Results   Component Value Date    CHOL 126 05/21/2025    HDL 59 05/21/2025    LDLCALC " 50.8 (L) 05/21/2025    TRIG 81 05/21/2025    CHOLHDL 46.8 05/21/2025     CARDIAC:   Lab Results   Component Value Date     (H) 08/02/2023       Imaging:    BLE Venous Ultrasound 12/1/2023:    There is no evidence of a left lower extremity DVT.    The right superficial femoral middle vein is normal.    The left superficial femoral middle vein is normal.    JUANCARLOS Study 11/29/2023:    Normal resting ABIs.    Normal waveforms.    BLE Venous Ultrasound 11/6/2023:  No evidence of deep venous thrombosis in either lower extremity.     Assessment/Plan:  Char Savage is a 84 y.o. female with BLE lymphedema, venous insufficiency, HTN, HLD, DM2, CKD stage 3, obesity, who presents for a follow up appointment.     Leg Swelling- Due to chronic venous insufficiency and BLE lymphedema.  Remains significantly improved and stable. Continue lymphedema clinic techniques at home.  Continue lasix 20 mg bid.  Continue graduated compression hose.  Limit sodium intake to 2000 mg daily.  Limit volume intake to 1.5 L daily.  Elevate legs when resting.    2. HTN- Continue current medication.    3. HLD- Pt with documented statin intolerance. he is tolerating bempedoic acid-zetia 180-10 mg daily with no issues. LDL 51 on 5/21/2025.compared to 70 on 11/22/2024. Continue bempedoic acid-zetia 180-10 mg daily.        4. Obesity- Encourage diet, exercise, and weight loss.    5. Burning Sensation in Feet- Likely neuropathic in origin. Refer to Neurology for evaluation.     Follow up in 6 months with lipids and cmp prior    Total duration of face to face visit time 20 minutes.  Total time spent counseling greater than fifty percent of total visit time.  Counseling included discussion regarding imaging findings, diagnosis, possibilities, treatment options, risks and benefits.  The patient had many questions regarding the options and long-term effects.    Handy Andres MD, PhD  Interventional Cardiology

## 2025-05-28 NOTE — PATIENT INSTRUCTIONS
Assessment/Plan:  Char Savage is a 84 y.o. female with BLE lymphedema, venous insufficiency, HTN, HLD, DM2, CKD stage 3, obesity, who presents for a follow up appointment.     Leg Swelling- Due to chronic venous insufficiency and BLE lymphedema.  Remains significantly improved and stable. Continue lymphedema clinic techniques at home.  Continue lasix 20 mg bid.  Continue graduated compression hose.  Limit sodium intake to 2000 mg daily.  Limit volume intake to 1.5 L daily.  Elevate legs when resting.    2. HTN- Continue current medication.    3. HLD- Pt with documented statin intolerance. he is tolerating bempedoic acid-zetia 180-10 mg daily with no issues. LDL 51 on 5/21/2025.compared to 70 on 11/22/2024. Continue bempedoic acid-zetia 180-10 mg daily.        4. Obesity- Encourage diet, exercise, and weight loss.    5. Burning Sensation in Feet- Likely neuropathic in origin. Refer to Neurology for evaluation.     Follow up in 6 months with lipids and cmp prior

## 2025-06-20 ENCOUNTER — PATIENT MESSAGE (OUTPATIENT)
Dept: NEPHROLOGY | Facility: CLINIC | Age: 85
End: 2025-06-20
Payer: MEDICARE

## 2025-07-21 ENCOUNTER — PATIENT MESSAGE (OUTPATIENT)
Dept: NEPHROLOGY | Facility: CLINIC | Age: 85
End: 2025-07-21
Payer: MEDICARE

## 2025-07-21 ENCOUNTER — PATIENT MESSAGE (OUTPATIENT)
Dept: FAMILY MEDICINE | Facility: CLINIC | Age: 85
End: 2025-07-21
Payer: MEDICARE

## 2025-07-22 ENCOUNTER — LAB VISIT (OUTPATIENT)
Dept: LAB | Facility: HOSPITAL | Age: 85
End: 2025-07-22
Attending: INTERNAL MEDICINE
Payer: MEDICARE

## 2025-07-22 DIAGNOSIS — N18.4 CHRONIC KIDNEY DISEASE (CKD), STAGE IV (SEVERE): ICD-10-CM

## 2025-07-22 DIAGNOSIS — N18.4 CHRONIC KIDNEY DISEASE (CKD), STAGE IV (SEVERE): Primary | ICD-10-CM

## 2025-07-22 LAB
BACTERIA #/AREA URNS AUTO: ABNORMAL /HPF
BILIRUB UR QL STRIP.AUTO: NEGATIVE
CLARITY UR: ABNORMAL
COLOR UR AUTO: YELLOW
GLUCOSE UR QL STRIP: NEGATIVE
GRAN CASTS UR QL COMP ASSIST: 1 /LPF (ref ?–0)
HGB UR QL STRIP: NEGATIVE
HYALINE CASTS UR QL AUTO: 11 /LPF (ref 0–1)
KETONES UR QL STRIP: NEGATIVE
LEUKOCYTE ESTERASE UR QL STRIP: ABNORMAL
MICROSCOPIC COMMENT: ABNORMAL
NITRITE UR QL STRIP: NEGATIVE
PH UR STRIP: 6 [PH]
PROT UR QL STRIP: ABNORMAL
RBC #/AREA URNS AUTO: 3 /HPF (ref 0–4)
SP GR UR STRIP: 1.01
SQUAMOUS #/AREA URNS AUTO: 14 /HPF
UROBILINOGEN UR STRIP-ACNC: NEGATIVE EU/DL
WBC #/AREA URNS AUTO: 9 /HPF (ref 0–5)

## 2025-07-22 PROCEDURE — 81003 URINALYSIS AUTO W/O SCOPE: CPT

## 2025-07-23 LAB — HOLD SPECIMEN: NORMAL

## 2025-07-25 ENCOUNTER — PATIENT MESSAGE (OUTPATIENT)
Dept: NEPHROLOGY | Facility: CLINIC | Age: 85
End: 2025-07-25
Payer: MEDICARE

## 2025-07-25 ENCOUNTER — TELEPHONE (OUTPATIENT)
Dept: NEPHROLOGY | Facility: CLINIC | Age: 85
End: 2025-07-25
Payer: MEDICARE

## 2025-07-25 NOTE — TELEPHONE ENCOUNTER
Patient contacted in reference of urine results viewed per Dr. Cazares. She was appreciative of the call and also confirmed receiving appointment change notice.

## 2025-07-25 NOTE — TELEPHONE ENCOUNTER
This patient was contacted in reference of my chart message. Informed, that she should avoid being constipated, stay hydrated, and continue taking AZO for a little longer than 2 days. She reports she no longer has any burning sensations, and feels better. Appreciative of the call vs. response in the portal.

## 2025-07-28 DIAGNOSIS — Z00.00 ENCOUNTER FOR MEDICARE ANNUAL WELLNESS EXAM: ICD-10-CM

## 2025-08-12 ENCOUNTER — OFFICE VISIT (OUTPATIENT)
Dept: NEUROLOGY | Facility: CLINIC | Age: 85
End: 2025-08-12
Payer: MEDICARE

## 2025-08-12 VITALS
DIASTOLIC BLOOD PRESSURE: 69 MMHG | HEART RATE: 63 BPM | WEIGHT: 164.25 LBS | HEIGHT: 65 IN | BODY MASS INDEX: 27.36 KG/M2 | OXYGEN SATURATION: 97 % | SYSTOLIC BLOOD PRESSURE: 148 MMHG

## 2025-08-12 DIAGNOSIS — R20.8 BURNING SENSATION OF FEET: ICD-10-CM

## 2025-08-12 PROCEDURE — 1101F PT FALLS ASSESS-DOCD LE1/YR: CPT | Mod: CPTII,S$GLB,, | Performed by: STUDENT IN AN ORGANIZED HEALTH CARE EDUCATION/TRAINING PROGRAM

## 2025-08-12 PROCEDURE — 3288F FALL RISK ASSESSMENT DOCD: CPT | Mod: CPTII,S$GLB,, | Performed by: STUDENT IN AN ORGANIZED HEALTH CARE EDUCATION/TRAINING PROGRAM

## 2025-08-12 PROCEDURE — 1160F RVW MEDS BY RX/DR IN RCRD: CPT | Mod: CPTII,S$GLB,, | Performed by: STUDENT IN AN ORGANIZED HEALTH CARE EDUCATION/TRAINING PROGRAM

## 2025-08-12 PROCEDURE — 1159F MED LIST DOCD IN RCRD: CPT | Mod: CPTII,S$GLB,, | Performed by: STUDENT IN AN ORGANIZED HEALTH CARE EDUCATION/TRAINING PROGRAM

## 2025-08-12 PROCEDURE — 1126F AMNT PAIN NOTED NONE PRSNT: CPT | Mod: CPTII,S$GLB,, | Performed by: STUDENT IN AN ORGANIZED HEALTH CARE EDUCATION/TRAINING PROGRAM

## 2025-08-12 PROCEDURE — 99999 PR PBB SHADOW E&M-EST. PATIENT-LVL IV: CPT | Mod: PBBFAC,,, | Performed by: STUDENT IN AN ORGANIZED HEALTH CARE EDUCATION/TRAINING PROGRAM

## 2025-08-12 PROCEDURE — 99202 OFFICE O/P NEW SF 15 MIN: CPT | Mod: S$GLB,,, | Performed by: STUDENT IN AN ORGANIZED HEALTH CARE EDUCATION/TRAINING PROGRAM

## 2025-08-12 RX ORDER — GABAPENTIN 100 MG/1
100 CAPSULE ORAL 3 TIMES DAILY
Qty: 90 CAPSULE | Refills: 3 | Status: SHIPPED | OUTPATIENT
Start: 2025-08-12 | End: 2026-08-12